# Patient Record
Sex: FEMALE | Race: WHITE | NOT HISPANIC OR LATINO | Employment: OTHER | URBAN - METROPOLITAN AREA
[De-identification: names, ages, dates, MRNs, and addresses within clinical notes are randomized per-mention and may not be internally consistent; named-entity substitution may affect disease eponyms.]

---

## 2017-01-10 ENCOUNTER — COMMUNICATION - HEALTHEAST (OUTPATIENT)
Dept: FAMILY MEDICINE | Facility: CLINIC | Age: 77
End: 2017-01-10

## 2017-01-10 DIAGNOSIS — F41.1 ANXIETY STATE: ICD-10-CM

## 2017-01-16 ENCOUNTER — COMMUNICATION - HEALTHEAST (OUTPATIENT)
Dept: FAMILY MEDICINE | Facility: CLINIC | Age: 77
End: 2017-01-16

## 2017-01-16 DIAGNOSIS — G62.9 NEUROPATHY: ICD-10-CM

## 2017-01-25 ENCOUNTER — COMMUNICATION - HEALTHEAST (OUTPATIENT)
Dept: FAMILY MEDICINE | Facility: CLINIC | Age: 77
End: 2017-01-25

## 2017-01-25 DIAGNOSIS — E11.40 DIABETIC NEUROPATHY (H): ICD-10-CM

## 2017-01-31 ENCOUNTER — COMMUNICATION - HEALTHEAST (OUTPATIENT)
Dept: SCHEDULING | Facility: CLINIC | Age: 77
End: 2017-01-31

## 2017-01-31 ENCOUNTER — OFFICE VISIT - HEALTHEAST (OUTPATIENT)
Dept: FAMILY MEDICINE | Facility: CLINIC | Age: 77
End: 2017-01-31

## 2017-01-31 DIAGNOSIS — J40 BRONCHITIS: ICD-10-CM

## 2017-01-31 ASSESSMENT — MIFFLIN-ST. JEOR: SCORE: 1276.38

## 2017-02-08 ENCOUNTER — COMMUNICATION - HEALTHEAST (OUTPATIENT)
Dept: FAMILY MEDICINE | Facility: CLINIC | Age: 77
End: 2017-02-08

## 2017-02-10 ENCOUNTER — OFFICE VISIT - HEALTHEAST (OUTPATIENT)
Dept: FAMILY MEDICINE | Facility: CLINIC | Age: 77
End: 2017-02-10

## 2017-02-10 DIAGNOSIS — J20.9 BRONCHITIS, ACUTE: ICD-10-CM

## 2017-02-10 DIAGNOSIS — J40 BRONCHITIS: ICD-10-CM

## 2017-02-10 ASSESSMENT — MIFFLIN-ST. JEOR: SCORE: 1278.28

## 2017-02-20 ENCOUNTER — COMMUNICATION - HEALTHEAST (OUTPATIENT)
Dept: FAMILY MEDICINE | Facility: CLINIC | Age: 77
End: 2017-02-20

## 2017-02-20 DIAGNOSIS — G62.9 NEUROPATHY: ICD-10-CM

## 2017-03-01 ENCOUNTER — OFFICE VISIT - HEALTHEAST (OUTPATIENT)
Dept: FAMILY MEDICINE | Facility: CLINIC | Age: 77
End: 2017-03-01

## 2017-03-01 DIAGNOSIS — M54.50 LUMBAGO: ICD-10-CM

## 2017-03-01 DIAGNOSIS — R06.09 OTHER FORMS OF DYSPNEA: ICD-10-CM

## 2017-03-01 DIAGNOSIS — R06.09 DYSPNEA ON EXERTION: ICD-10-CM

## 2017-03-01 ASSESSMENT — MIFFLIN-ST. JEOR: SCORE: 1280.55

## 2017-03-07 ENCOUNTER — COMMUNICATION - HEALTHEAST (OUTPATIENT)
Dept: CARDIOLOGY | Facility: HOSPITAL | Age: 77
End: 2017-03-07

## 2017-03-08 ENCOUNTER — HOSPITAL ENCOUNTER (OUTPATIENT)
Dept: NUCLEAR MEDICINE | Facility: HOSPITAL | Age: 77
Discharge: HOME | End: 2017-03-08
Attending: FAMILY MEDICINE

## 2017-03-08 ENCOUNTER — HOSPITAL ENCOUNTER (OUTPATIENT)
Dept: CARDIOLOGY | Facility: HOSPITAL | Age: 77
Discharge: HOME | End: 2017-03-08
Attending: FAMILY MEDICINE

## 2017-03-08 DIAGNOSIS — R06.09 DYSPNEA ON EXERTION: ICD-10-CM

## 2017-03-08 DIAGNOSIS — R06.09 OTHER FORMS OF DYSPNEA: ICD-10-CM

## 2017-03-08 LAB
CV STRESS CURRENT BP HE: NORMAL
CV STRESS CURRENT HR HE: 70
CV STRESS CURRENT HR HE: 70
CV STRESS CURRENT HR HE: 74
CV STRESS CURRENT HR HE: 78
CV STRESS CURRENT HR HE: 79
CV STRESS CURRENT HR HE: 80
CV STRESS CURRENT HR HE: 80
CV STRESS CURRENT HR HE: 81
CV STRESS CURRENT HR HE: 82
CV STRESS CURRENT HR HE: 82
CV STRESS CURRENT HR HE: 84
CV STRESS CURRENT HR HE: 84
CV STRESS CURRENT HR HE: 87
CV STRESS CURRENT HR HE: 88
CV STRESS CURRENT HR HE: 90
CV STRESS CURRENT HR HE: 92
CV STRESS DEVIATION TIME HE: NORMAL
CV STRESS ECHO PERCENT HR HE: NORMAL
CV STRESS EXERCISE STAGE HE: NORMAL
CV STRESS FINAL RESTING BP HE: NORMAL
CV STRESS FINAL RESTING HR HE: 79
CV STRESS MAX HR HE: 92
CV STRESS MAX TREADMILL GRADE HE: 0
CV STRESS MAX TREADMILL SPEED HE: 0
CV STRESS PEAK DIA BP HE: NORMAL
CV STRESS PEAK SYS BP HE: NORMAL
CV STRESS PHASE HE: NORMAL
CV STRESS PROTOCOL HE: NORMAL
CV STRESS RESTING PT POSITION HE: NORMAL
CV STRESS ST DEVIATION AMOUNT HE: NORMAL
CV STRESS ST DEVIATION ELEVATION HE: NORMAL
CV STRESS ST EVELATION AMOUNT HE: NORMAL
CV STRESS TEST TYPE HE: NORMAL
CV STRESS TOTAL STAGE TIME MIN 1 HE: NORMAL
NUC STRESS EJECTION FRACTION: 75 %
STRESS ECHO BASELINE BP: NORMAL
STRESS ECHO BASELINE HR: 72
STRESS ECHO CALCULATED PERCENT HR: 63 %
STRESS ECHO LAST STRESS BP: NORMAL
STRESS ECHO LAST STRESS HR: 84

## 2017-03-15 ENCOUNTER — COMMUNICATION - HEALTHEAST (OUTPATIENT)
Dept: FAMILY MEDICINE | Facility: CLINIC | Age: 77
End: 2017-03-15

## 2017-03-15 DIAGNOSIS — R06.00 DYSPNEA: ICD-10-CM

## 2017-03-16 ENCOUNTER — COMMUNICATION - HEALTHEAST (OUTPATIENT)
Dept: PULMONOLOGY | Facility: OTHER | Age: 77
End: 2017-03-16

## 2017-03-16 ENCOUNTER — COMMUNICATION - HEALTHEAST (OUTPATIENT)
Dept: FAMILY MEDICINE | Facility: CLINIC | Age: 77
End: 2017-03-16

## 2017-03-16 DIAGNOSIS — G62.9 NEUROPATHY: ICD-10-CM

## 2017-03-17 ENCOUNTER — AMBULATORY - HEALTHEAST (OUTPATIENT)
Dept: PULMONOLOGY | Facility: OTHER | Age: 77
End: 2017-03-17

## 2017-03-17 DIAGNOSIS — R06.00 DYSPNEA: ICD-10-CM

## 2017-03-24 ENCOUNTER — COMMUNICATION - HEALTHEAST (OUTPATIENT)
Dept: FAMILY MEDICINE | Facility: CLINIC | Age: 77
End: 2017-03-24

## 2017-03-24 DIAGNOSIS — E11.40 DIABETIC NEUROPATHY (H): ICD-10-CM

## 2017-04-17 ENCOUNTER — RECORDS - HEALTHEAST (OUTPATIENT)
Dept: ADMINISTRATIVE | Facility: OTHER | Age: 77
End: 2017-04-17

## 2017-04-17 ENCOUNTER — RECORDS - HEALTHEAST (OUTPATIENT)
Dept: PULMONOLOGY | Facility: OTHER | Age: 77
End: 2017-04-17

## 2017-04-17 ENCOUNTER — OFFICE VISIT - HEALTHEAST (OUTPATIENT)
Dept: PULMONOLOGY | Facility: OTHER | Age: 77
End: 2017-04-17

## 2017-04-17 DIAGNOSIS — J30.1 SEASONAL ALLERGIC RHINITIS DUE TO POLLEN: ICD-10-CM

## 2017-04-17 DIAGNOSIS — R94.2 DIFFUSION CAPACITY OF LUNG (DL), DECREASED: ICD-10-CM

## 2017-04-17 DIAGNOSIS — R91.8 PULMONARY INFILTRATES ON CXR: ICD-10-CM

## 2017-04-17 DIAGNOSIS — R06.09 DYSPNEA ON EXERTION: ICD-10-CM

## 2017-04-17 DIAGNOSIS — R06.00 DYSPNEA, UNSPECIFIED: ICD-10-CM

## 2017-04-17 ASSESSMENT — MIFFLIN-ST. JEOR: SCORE: 1289.62

## 2017-04-18 ENCOUNTER — HOSPITAL ENCOUNTER (OUTPATIENT)
Dept: CT IMAGING | Facility: HOSPITAL | Age: 77
Discharge: HOME | End: 2017-04-18
Attending: INTERNAL MEDICINE

## 2017-04-18 DIAGNOSIS — R06.09 OTHER FORMS OF DYSPNEA: ICD-10-CM

## 2017-04-18 DIAGNOSIS — R91.8 PULMONARY INFILTRATES ON CXR: ICD-10-CM

## 2017-04-18 DIAGNOSIS — R06.09 DYSPNEA ON EXERTION: ICD-10-CM

## 2017-04-18 DIAGNOSIS — R94.2 DIFFUSION CAPACITY OF LUNG (DL), DECREASED: ICD-10-CM

## 2017-04-19 ENCOUNTER — COMMUNICATION - HEALTHEAST (OUTPATIENT)
Dept: FAMILY MEDICINE | Facility: CLINIC | Age: 77
End: 2017-04-19

## 2017-04-19 DIAGNOSIS — M54.50 LUMBAGO: ICD-10-CM

## 2017-04-19 DIAGNOSIS — G62.9 NEUROPATHY: ICD-10-CM

## 2017-04-20 ENCOUNTER — COMMUNICATION - HEALTHEAST (OUTPATIENT)
Dept: INTENSIVE CARE | Facility: CLINIC | Age: 77
End: 2017-04-20

## 2017-05-01 ENCOUNTER — COMMUNICATION - HEALTHEAST (OUTPATIENT)
Dept: FAMILY MEDICINE | Facility: CLINIC | Age: 77
End: 2017-05-01

## 2017-05-02 ENCOUNTER — COMMUNICATION - HEALTHEAST (OUTPATIENT)
Dept: FAMILY MEDICINE | Facility: CLINIC | Age: 77
End: 2017-05-02

## 2017-05-09 ENCOUNTER — OFFICE VISIT - HEALTHEAST (OUTPATIENT)
Dept: FAMILY MEDICINE | Facility: CLINIC | Age: 77
End: 2017-05-09

## 2017-05-09 DIAGNOSIS — Z01.818 PREOP EXAMINATION: ICD-10-CM

## 2017-05-09 DIAGNOSIS — H26.9 CATARACT: ICD-10-CM

## 2017-05-09 DIAGNOSIS — J30.1 SEASONAL ALLERGIC RHINITIS DUE TO POLLEN: ICD-10-CM

## 2017-05-09 DIAGNOSIS — R06.09 DYSPNEA ON EXERTION: ICD-10-CM

## 2017-05-09 ASSESSMENT — MIFFLIN-ST. JEOR: SCORE: 1307.76

## 2017-05-16 ENCOUNTER — RECORDS - HEALTHEAST (OUTPATIENT)
Dept: ADMINISTRATIVE | Facility: OTHER | Age: 77
End: 2017-05-16

## 2017-05-23 ENCOUNTER — COMMUNICATION - HEALTHEAST (OUTPATIENT)
Dept: FAMILY MEDICINE | Facility: CLINIC | Age: 77
End: 2017-05-23

## 2017-05-23 DIAGNOSIS — G62.9 NEUROPATHY: ICD-10-CM

## 2017-05-25 ENCOUNTER — COMMUNICATION - HEALTHEAST (OUTPATIENT)
Dept: FAMILY MEDICINE | Facility: CLINIC | Age: 77
End: 2017-05-25

## 2017-05-25 DIAGNOSIS — E11.40 DIABETIC NEUROPATHY (H): ICD-10-CM

## 2017-05-30 ENCOUNTER — RECORDS - HEALTHEAST (OUTPATIENT)
Dept: ADMINISTRATIVE | Facility: OTHER | Age: 77
End: 2017-05-30

## 2017-06-14 ENCOUNTER — COMMUNICATION - HEALTHEAST (OUTPATIENT)
Dept: FAMILY MEDICINE | Facility: CLINIC | Age: 77
End: 2017-06-14

## 2017-06-14 DIAGNOSIS — I10 HTN (HYPERTENSION): ICD-10-CM

## 2017-06-21 ENCOUNTER — COMMUNICATION - HEALTHEAST (OUTPATIENT)
Dept: FAMILY MEDICINE | Facility: CLINIC | Age: 77
End: 2017-06-21

## 2017-06-21 DIAGNOSIS — G62.9 NEUROPATHY: ICD-10-CM

## 2017-06-21 DIAGNOSIS — R52 PAIN: ICD-10-CM

## 2017-07-09 ENCOUNTER — COMMUNICATION - HEALTHEAST (OUTPATIENT)
Dept: FAMILY MEDICINE | Facility: CLINIC | Age: 77
End: 2017-07-09

## 2017-07-09 DIAGNOSIS — R52 PAIN: ICD-10-CM

## 2017-07-10 ENCOUNTER — COMMUNICATION - HEALTHEAST (OUTPATIENT)
Dept: FAMILY MEDICINE | Facility: CLINIC | Age: 77
End: 2017-07-10

## 2017-07-10 DIAGNOSIS — E11.40 DIABETIC NEUROPATHY (H): ICD-10-CM

## 2017-07-11 ENCOUNTER — COMMUNICATION - HEALTHEAST (OUTPATIENT)
Dept: FAMILY MEDICINE | Facility: CLINIC | Age: 77
End: 2017-07-11

## 2017-07-11 DIAGNOSIS — M54.50 LUMBAGO: ICD-10-CM

## 2017-07-20 ENCOUNTER — COMMUNICATION - HEALTHEAST (OUTPATIENT)
Dept: FAMILY MEDICINE | Facility: CLINIC | Age: 77
End: 2017-07-20

## 2017-07-20 DIAGNOSIS — G62.9 NEUROPATHY: ICD-10-CM

## 2017-07-21 ENCOUNTER — COMMUNICATION - HEALTHEAST (OUTPATIENT)
Dept: FAMILY MEDICINE | Facility: CLINIC | Age: 77
End: 2017-07-21

## 2017-07-21 DIAGNOSIS — J30.9 ALLERGIC RHINITIS, CAUSE UNSPECIFIED: ICD-10-CM

## 2017-08-02 ENCOUNTER — COMMUNICATION - HEALTHEAST (OUTPATIENT)
Dept: FAMILY MEDICINE | Facility: CLINIC | Age: 77
End: 2017-08-02

## 2017-08-02 DIAGNOSIS — F41.1 ANXIETY STATE: ICD-10-CM

## 2017-08-10 ENCOUNTER — COMMUNICATION - HEALTHEAST (OUTPATIENT)
Dept: FAMILY MEDICINE | Facility: CLINIC | Age: 77
End: 2017-08-10

## 2017-08-10 DIAGNOSIS — M54.50 LUMBAGO: ICD-10-CM

## 2017-09-13 ENCOUNTER — COMMUNICATION - HEALTHEAST (OUTPATIENT)
Dept: FAMILY MEDICINE | Facility: CLINIC | Age: 77
End: 2017-09-13

## 2017-09-13 DIAGNOSIS — G62.9 NEUROPATHY: ICD-10-CM

## 2017-09-13 DIAGNOSIS — K21.9 GERD (GASTROESOPHAGEAL REFLUX DISEASE): ICD-10-CM

## 2017-09-21 ENCOUNTER — COMMUNICATION - HEALTHEAST (OUTPATIENT)
Dept: FAMILY MEDICINE | Facility: CLINIC | Age: 77
End: 2017-09-21

## 2017-09-21 DIAGNOSIS — E11.40 DIABETIC NEUROPATHY (H): ICD-10-CM

## 2017-10-25 ENCOUNTER — COMMUNICATION - HEALTHEAST (OUTPATIENT)
Dept: FAMILY MEDICINE | Facility: CLINIC | Age: 77
End: 2017-10-25

## 2017-10-25 DIAGNOSIS — F41.1 ANXIETY STATE: ICD-10-CM

## 2017-11-01 ENCOUNTER — RECORDS - HEALTHEAST (OUTPATIENT)
Dept: ADMINISTRATIVE | Facility: OTHER | Age: 77
End: 2017-11-01

## 2017-11-01 ENCOUNTER — OFFICE VISIT - HEALTHEAST (OUTPATIENT)
Dept: FAMILY MEDICINE | Facility: CLINIC | Age: 77
End: 2017-11-01

## 2017-11-01 DIAGNOSIS — Z00.00 ROUTINE GENERAL MEDICAL EXAMINATION AT A HEALTH CARE FACILITY: ICD-10-CM

## 2017-11-01 DIAGNOSIS — M54.50 LUMBAGO: ICD-10-CM

## 2017-11-01 DIAGNOSIS — G62.9 NEUROPATHY: ICD-10-CM

## 2017-11-01 ASSESSMENT — MIFFLIN-ST. JEOR: SCORE: 1294.72

## 2017-11-13 ENCOUNTER — COMMUNICATION - HEALTHEAST (OUTPATIENT)
Dept: FAMILY MEDICINE | Facility: CLINIC | Age: 77
End: 2017-11-13

## 2017-11-13 DIAGNOSIS — E11.40 DIABETIC NEUROPATHY (H): ICD-10-CM

## 2017-11-30 ENCOUNTER — COMMUNICATION - HEALTHEAST (OUTPATIENT)
Dept: FAMILY MEDICINE | Facility: CLINIC | Age: 77
End: 2017-11-30

## 2017-11-30 DIAGNOSIS — M54.50 LUMBAGO: ICD-10-CM

## 2017-11-30 DIAGNOSIS — J30.1 SEASONAL ALLERGIC RHINITIS DUE TO POLLEN: ICD-10-CM

## 2017-12-08 ENCOUNTER — COMMUNICATION - HEALTHEAST (OUTPATIENT)
Dept: FAMILY MEDICINE | Facility: CLINIC | Age: 77
End: 2017-12-08

## 2017-12-08 DIAGNOSIS — J40 BRONCHITIS: ICD-10-CM

## 2017-12-18 ENCOUNTER — OFFICE VISIT - HEALTHEAST (OUTPATIENT)
Dept: FAMILY MEDICINE | Facility: CLINIC | Age: 77
End: 2017-12-18

## 2017-12-18 DIAGNOSIS — J32.9 SINUSITIS: ICD-10-CM

## 2017-12-19 ENCOUNTER — COMMUNICATION - HEALTHEAST (OUTPATIENT)
Dept: FAMILY MEDICINE | Facility: CLINIC | Age: 77
End: 2017-12-19

## 2017-12-19 DIAGNOSIS — G62.9 NEUROPATHY: ICD-10-CM

## 2017-12-20 ENCOUNTER — COMMUNICATION - HEALTHEAST (OUTPATIENT)
Dept: FAMILY MEDICINE | Facility: CLINIC | Age: 77
End: 2017-12-20

## 2017-12-20 DIAGNOSIS — J30.9 ALLERGIC RHINITIS: ICD-10-CM

## 2017-12-27 ENCOUNTER — COMMUNICATION - HEALTHEAST (OUTPATIENT)
Dept: FAMILY MEDICINE | Facility: CLINIC | Age: 77
End: 2017-12-27

## 2017-12-27 DIAGNOSIS — M54.50 LUMBAGO: ICD-10-CM

## 2017-12-30 ENCOUNTER — COMMUNICATION - HEALTHEAST (OUTPATIENT)
Dept: FAMILY MEDICINE | Facility: CLINIC | Age: 77
End: 2017-12-30

## 2017-12-30 DIAGNOSIS — J30.1 SEASONAL ALLERGIC RHINITIS DUE TO POLLEN: ICD-10-CM

## 2018-01-15 ENCOUNTER — COMMUNICATION - HEALTHEAST (OUTPATIENT)
Dept: FAMILY MEDICINE | Facility: CLINIC | Age: 78
End: 2018-01-15

## 2018-01-15 DIAGNOSIS — E11.40 DIABETIC NEUROPATHY (H): ICD-10-CM

## 2018-01-17 ENCOUNTER — COMMUNICATION - HEALTHEAST (OUTPATIENT)
Dept: FAMILY MEDICINE | Facility: CLINIC | Age: 78
End: 2018-01-17

## 2018-01-17 DIAGNOSIS — F41.1 ANXIETY STATE: ICD-10-CM

## 2018-01-30 ENCOUNTER — COMMUNICATION - HEALTHEAST (OUTPATIENT)
Dept: FAMILY MEDICINE | Facility: CLINIC | Age: 78
End: 2018-01-30

## 2018-01-30 DIAGNOSIS — G62.9 NEUROPATHY: ICD-10-CM

## 2018-02-08 ENCOUNTER — COMMUNICATION - HEALTHEAST (OUTPATIENT)
Dept: FAMILY MEDICINE | Facility: CLINIC | Age: 78
End: 2018-02-08

## 2018-02-08 DIAGNOSIS — J30.1 SEASONAL ALLERGIC RHINITIS DUE TO POLLEN: ICD-10-CM

## 2018-02-22 ENCOUNTER — OFFICE VISIT - HEALTHEAST (OUTPATIENT)
Dept: FAMILY MEDICINE | Facility: CLINIC | Age: 78
End: 2018-02-22

## 2018-02-22 DIAGNOSIS — D17.22 LIPOMA OF LEFT UPPER EXTREMITY: ICD-10-CM

## 2018-02-22 ASSESSMENT — MIFFLIN-ST. JEOR: SCORE: 1308.33

## 2018-03-04 ENCOUNTER — COMMUNICATION - HEALTHEAST (OUTPATIENT)
Dept: FAMILY MEDICINE | Facility: CLINIC | Age: 78
End: 2018-03-04

## 2018-03-04 DIAGNOSIS — I10 HTN (HYPERTENSION): ICD-10-CM

## 2018-03-12 ENCOUNTER — AMBULATORY - HEALTHEAST (OUTPATIENT)
Dept: FAMILY MEDICINE | Facility: CLINIC | Age: 78
End: 2018-03-12

## 2018-03-12 ENCOUNTER — COMMUNICATION - HEALTHEAST (OUTPATIENT)
Dept: FAMILY MEDICINE | Facility: CLINIC | Age: 78
End: 2018-03-12

## 2018-03-12 ENCOUNTER — AMBULATORY - HEALTHEAST (OUTPATIENT)
Dept: LAB | Facility: CLINIC | Age: 78
End: 2018-03-12

## 2018-03-12 DIAGNOSIS — I10 HYPERTENSION: ICD-10-CM

## 2018-03-12 DIAGNOSIS — E11.40 DIABETIC NEUROPATHY (H): ICD-10-CM

## 2018-03-12 DIAGNOSIS — J30.1 SEASONAL ALLERGIC RHINITIS DUE TO POLLEN: ICD-10-CM

## 2018-03-12 LAB
ANION GAP SERPL CALCULATED.3IONS-SCNC: 13 MMOL/L (ref 5–18)
BUN SERPL-MCNC: 23 MG/DL (ref 8–28)
CALCIUM SERPL-MCNC: 9.4 MG/DL (ref 8.5–10.5)
CHLORIDE BLD-SCNC: 108 MMOL/L (ref 98–107)
CO2 SERPL-SCNC: 20 MMOL/L (ref 22–31)
CREAT SERPL-MCNC: 0.89 MG/DL (ref 0.6–1.1)
GFR SERPL CREATININE-BSD FRML MDRD: >60 ML/MIN/1.73M2
GLUCOSE BLD-MCNC: 93 MG/DL (ref 70–125)
POTASSIUM BLD-SCNC: 4.9 MMOL/L (ref 3.5–5)
SODIUM SERPL-SCNC: 141 MMOL/L (ref 136–145)

## 2018-03-13 ENCOUNTER — COMMUNICATION - HEALTHEAST (OUTPATIENT)
Dept: FAMILY MEDICINE | Facility: CLINIC | Age: 78
End: 2018-03-13

## 2018-03-13 DIAGNOSIS — G62.9 NEUROPATHY: ICD-10-CM

## 2018-04-05 ENCOUNTER — OFFICE VISIT - HEALTHEAST (OUTPATIENT)
Dept: FAMILY MEDICINE | Facility: CLINIC | Age: 78
End: 2018-04-05

## 2018-04-05 DIAGNOSIS — M54.50 LUMBAGO: ICD-10-CM

## 2018-04-05 DIAGNOSIS — R22.32 MASS OF LEFT FOREARM: ICD-10-CM

## 2018-04-05 ASSESSMENT — MIFFLIN-ST. JEOR: SCORE: 1284.52

## 2018-04-12 ENCOUNTER — HOSPITAL ENCOUNTER (OUTPATIENT)
Dept: MRI IMAGING | Facility: HOSPITAL | Age: 78
Discharge: HOME | End: 2018-04-12
Attending: FAMILY MEDICINE

## 2018-04-12 DIAGNOSIS — R22.32 MASS OF LEFT FOREARM: ICD-10-CM

## 2018-04-13 ENCOUNTER — AMBULATORY - HEALTHEAST (OUTPATIENT)
Dept: FAMILY MEDICINE | Facility: CLINIC | Age: 78
End: 2018-04-13

## 2018-04-13 DIAGNOSIS — R22.30: ICD-10-CM

## 2018-04-16 ENCOUNTER — COMMUNICATION - HEALTHEAST (OUTPATIENT)
Dept: FAMILY MEDICINE | Facility: CLINIC | Age: 78
End: 2018-04-16

## 2018-04-16 DIAGNOSIS — G62.9 NEUROPATHY: ICD-10-CM

## 2018-04-18 ENCOUNTER — RECORDS - HEALTHEAST (OUTPATIENT)
Dept: ADMINISTRATIVE | Facility: OTHER | Age: 78
End: 2018-04-18

## 2018-04-20 ENCOUNTER — COMMUNICATION - HEALTHEAST (OUTPATIENT)
Dept: FAMILY MEDICINE | Facility: CLINIC | Age: 78
End: 2018-04-20

## 2018-04-20 DIAGNOSIS — J30.1 SEASONAL ALLERGIC RHINITIS DUE TO POLLEN: ICD-10-CM

## 2018-04-20 DIAGNOSIS — J30.9 ALLERGIC RHINITIS: ICD-10-CM

## 2018-04-21 ENCOUNTER — COMMUNICATION - HEALTHEAST (OUTPATIENT)
Dept: FAMILY MEDICINE | Facility: CLINIC | Age: 78
End: 2018-04-21

## 2018-04-21 DIAGNOSIS — J30.9 ALLERGIC RHINITIS: ICD-10-CM

## 2018-04-24 ENCOUNTER — COMMUNICATION - HEALTHEAST (OUTPATIENT)
Dept: FAMILY MEDICINE | Facility: CLINIC | Age: 78
End: 2018-04-24

## 2018-04-25 ENCOUNTER — COMMUNICATION - HEALTHEAST (OUTPATIENT)
Dept: FAMILY MEDICINE | Facility: CLINIC | Age: 78
End: 2018-04-25

## 2018-04-25 DIAGNOSIS — R06.09 DYSPNEA ON EXERTION: ICD-10-CM

## 2018-04-30 ENCOUNTER — AMBULATORY - HEALTHEAST (OUTPATIENT)
Dept: FAMILY MEDICINE | Facility: CLINIC | Age: 78
End: 2018-04-30

## 2018-04-30 ENCOUNTER — COMMUNICATION - HEALTHEAST (OUTPATIENT)
Dept: FAMILY MEDICINE | Facility: CLINIC | Age: 78
End: 2018-04-30

## 2018-05-03 ENCOUNTER — COMMUNICATION - HEALTHEAST (OUTPATIENT)
Dept: FAMILY MEDICINE | Facility: CLINIC | Age: 78
End: 2018-05-03

## 2018-05-03 DIAGNOSIS — M54.50 LUMBAGO: ICD-10-CM

## 2018-05-21 ENCOUNTER — COMMUNICATION - HEALTHEAST (OUTPATIENT)
Dept: FAMILY MEDICINE | Facility: CLINIC | Age: 78
End: 2018-05-21

## 2018-05-21 DIAGNOSIS — J30.9 ALLERGIC RHINITIS: ICD-10-CM

## 2018-05-22 ENCOUNTER — COMMUNICATION - HEALTHEAST (OUTPATIENT)
Dept: FAMILY MEDICINE | Facility: CLINIC | Age: 78
End: 2018-05-22

## 2018-05-22 DIAGNOSIS — G62.9 NEUROPATHY: ICD-10-CM

## 2018-05-31 ENCOUNTER — COMMUNICATION - HEALTHEAST (OUTPATIENT)
Dept: FAMILY MEDICINE | Facility: CLINIC | Age: 78
End: 2018-05-31

## 2018-05-31 DIAGNOSIS — I10 HTN (HYPERTENSION): ICD-10-CM

## 2018-06-01 ENCOUNTER — COMMUNICATION - HEALTHEAST (OUTPATIENT)
Dept: FAMILY MEDICINE | Facility: CLINIC | Age: 78
End: 2018-06-01

## 2018-06-01 DIAGNOSIS — I10 HTN (HYPERTENSION): ICD-10-CM

## 2018-06-09 ENCOUNTER — COMMUNICATION - HEALTHEAST (OUTPATIENT)
Dept: FAMILY MEDICINE | Facility: CLINIC | Age: 78
End: 2018-06-09

## 2018-06-09 DIAGNOSIS — R52 PAIN: ICD-10-CM

## 2018-06-11 ENCOUNTER — AMBULATORY - HEALTHEAST (OUTPATIENT)
Dept: FAMILY MEDICINE | Facility: CLINIC | Age: 78
End: 2018-06-11

## 2018-06-11 ENCOUNTER — OFFICE VISIT - HEALTHEAST (OUTPATIENT)
Dept: FAMILY MEDICINE | Facility: CLINIC | Age: 78
End: 2018-06-11

## 2018-06-11 DIAGNOSIS — R07.1 CHEST PAIN ON BREATHING: ICD-10-CM

## 2018-06-11 DIAGNOSIS — R06.02 SHORTNESS OF BREATH: ICD-10-CM

## 2018-06-11 ASSESSMENT — MIFFLIN-ST. JEOR: SCORE: 1281.11

## 2018-06-12 ENCOUNTER — COMMUNICATION - HEALTHEAST (OUTPATIENT)
Dept: FAMILY MEDICINE | Facility: CLINIC | Age: 78
End: 2018-06-12

## 2018-06-20 ENCOUNTER — COMMUNICATION - HEALTHEAST (OUTPATIENT)
Dept: FAMILY MEDICINE | Facility: CLINIC | Age: 78
End: 2018-06-20

## 2018-06-20 DIAGNOSIS — E11.40 DIABETIC NEUROPATHY (H): ICD-10-CM

## 2018-06-25 ENCOUNTER — COMMUNICATION - HEALTHEAST (OUTPATIENT)
Dept: SCHEDULING | Facility: CLINIC | Age: 78
End: 2018-06-25

## 2018-06-25 ENCOUNTER — COMMUNICATION - HEALTHEAST (OUTPATIENT)
Dept: FAMILY MEDICINE | Facility: CLINIC | Age: 78
End: 2018-06-25

## 2018-06-25 DIAGNOSIS — M54.50 LUMBAGO: ICD-10-CM

## 2018-06-25 DIAGNOSIS — K21.9 GERD (GASTROESOPHAGEAL REFLUX DISEASE): ICD-10-CM

## 2018-06-25 DIAGNOSIS — G62.9 NEUROPATHY: ICD-10-CM

## 2018-07-05 ENCOUNTER — OFFICE VISIT - HEALTHEAST (OUTPATIENT)
Dept: FAMILY MEDICINE | Facility: CLINIC | Age: 78
End: 2018-07-05

## 2018-07-05 DIAGNOSIS — L03.115 CELLULITIS OF RIGHT LOWER EXTREMITY: ICD-10-CM

## 2018-07-05 DIAGNOSIS — L98.9 SKIN SORE: ICD-10-CM

## 2018-07-07 ENCOUNTER — COMMUNICATION - HEALTHEAST (OUTPATIENT)
Dept: SCHEDULING | Facility: CLINIC | Age: 78
End: 2018-07-07

## 2018-07-08 LAB
BACTERIA SPEC CULT: ABNORMAL
BACTERIA SPEC CULT: ABNORMAL

## 2018-07-10 ENCOUNTER — COMMUNICATION - HEALTHEAST (OUTPATIENT)
Dept: PODIATRY | Facility: CLINIC | Age: 78
End: 2018-07-10

## 2018-07-11 ENCOUNTER — COMMUNICATION - HEALTHEAST (OUTPATIENT)
Dept: PHARMACY | Facility: CLINIC | Age: 78
End: 2018-07-11

## 2018-07-16 ENCOUNTER — COMMUNICATION - HEALTHEAST (OUTPATIENT)
Dept: FAMILY MEDICINE | Facility: CLINIC | Age: 78
End: 2018-07-16

## 2018-07-24 ENCOUNTER — COMMUNICATION - HEALTHEAST (OUTPATIENT)
Dept: SCHEDULING | Facility: CLINIC | Age: 78
End: 2018-07-24

## 2018-07-24 DIAGNOSIS — M54.50 LUMBAGO: ICD-10-CM

## 2018-07-25 ENCOUNTER — COMMUNICATION - HEALTHEAST (OUTPATIENT)
Dept: FAMILY MEDICINE | Facility: CLINIC | Age: 78
End: 2018-07-25

## 2018-07-25 DIAGNOSIS — G62.9 NEUROPATHY: ICD-10-CM

## 2018-08-10 ENCOUNTER — COMMUNICATION - HEALTHEAST (OUTPATIENT)
Dept: SCHEDULING | Facility: CLINIC | Age: 78
End: 2018-08-10

## 2018-08-10 DIAGNOSIS — G62.9 NEUROPATHY: ICD-10-CM

## 2018-08-23 ENCOUNTER — COMMUNICATION - HEALTHEAST (OUTPATIENT)
Dept: FAMILY MEDICINE | Facility: CLINIC | Age: 78
End: 2018-08-23

## 2018-08-23 DIAGNOSIS — R06.09 DYSPNEA ON EXERTION: ICD-10-CM

## 2018-08-27 ENCOUNTER — COMMUNICATION - HEALTHEAST (OUTPATIENT)
Dept: FAMILY MEDICINE | Facility: CLINIC | Age: 78
End: 2018-08-27

## 2018-08-27 ENCOUNTER — OFFICE VISIT - HEALTHEAST (OUTPATIENT)
Dept: FAMILY MEDICINE | Facility: CLINIC | Age: 78
End: 2018-08-27

## 2018-08-27 DIAGNOSIS — M54.50 LUMBAGO: ICD-10-CM

## 2018-08-27 DIAGNOSIS — M25.511 SHOULDER PAIN, RIGHT: ICD-10-CM

## 2018-08-27 ASSESSMENT — MIFFLIN-ST. JEOR: SCORE: 1286.22

## 2018-09-11 ENCOUNTER — COMMUNICATION - HEALTHEAST (OUTPATIENT)
Dept: FAMILY MEDICINE | Facility: CLINIC | Age: 78
End: 2018-09-11

## 2018-09-11 DIAGNOSIS — G62.9 NEUROPATHY: ICD-10-CM

## 2018-09-13 ENCOUNTER — COMMUNICATION - HEALTHEAST (OUTPATIENT)
Dept: FAMILY MEDICINE | Facility: CLINIC | Age: 78
End: 2018-09-13

## 2018-09-16 ENCOUNTER — COMMUNICATION - HEALTHEAST (OUTPATIENT)
Dept: FAMILY MEDICINE | Facility: CLINIC | Age: 78
End: 2018-09-16

## 2018-09-16 DIAGNOSIS — E11.40 DIABETIC NEUROPATHY (H): ICD-10-CM

## 2018-09-17 ENCOUNTER — OFFICE VISIT - HEALTHEAST (OUTPATIENT)
Dept: FAMILY MEDICINE | Facility: CLINIC | Age: 78
End: 2018-09-17

## 2018-09-17 DIAGNOSIS — N30.00 ACUTE CYSTITIS WITHOUT HEMATURIA: ICD-10-CM

## 2018-09-17 LAB
ALBUMIN UR-MCNC: NEGATIVE MG/DL
APPEARANCE UR: CLEAR
BACTERIA #/AREA URNS HPF: ABNORMAL HPF
BILIRUB UR QL STRIP: NEGATIVE
COLOR UR AUTO: YELLOW
GLUCOSE UR STRIP-MCNC: NEGATIVE MG/DL
HGB UR QL STRIP: NEGATIVE
KETONES UR STRIP-MCNC: NEGATIVE MG/DL
LEUKOCYTE ESTERASE UR QL STRIP: ABNORMAL
NITRATE UR QL: NEGATIVE
PH UR STRIP: 5 [PH] (ref 5–8)
RBC #/AREA URNS AUTO: ABNORMAL HPF
SP GR UR STRIP: 1.02 (ref 1–1.03)
SQUAMOUS #/AREA URNS AUTO: ABNORMAL LPF
UROBILINOGEN UR STRIP-ACNC: ABNORMAL
WBC #/AREA URNS AUTO: ABNORMAL HPF

## 2018-09-18 LAB — BACTERIA SPEC CULT: NORMAL

## 2018-09-24 ENCOUNTER — OFFICE VISIT - HEALTHEAST (OUTPATIENT)
Dept: FAMILY MEDICINE | Facility: CLINIC | Age: 78
End: 2018-09-24

## 2018-09-24 DIAGNOSIS — R10.30 LOWER ABDOMINAL PAIN: ICD-10-CM

## 2018-09-24 DIAGNOSIS — F41.1 ANXIETY STATE: ICD-10-CM

## 2018-09-24 DIAGNOSIS — F32.9 MAJOR DEPRESSION: ICD-10-CM

## 2018-09-24 ASSESSMENT — MIFFLIN-ST. JEOR: SCORE: 1273.75

## 2018-09-25 ENCOUNTER — AMBULATORY - HEALTHEAST (OUTPATIENT)
Dept: FAMILY MEDICINE | Facility: CLINIC | Age: 78
End: 2018-09-25

## 2018-09-25 ENCOUNTER — HOSPITAL ENCOUNTER (OUTPATIENT)
Dept: CT IMAGING | Facility: HOSPITAL | Age: 78
Discharge: HOME | End: 2018-09-25
Attending: FAMILY MEDICINE

## 2018-09-25 DIAGNOSIS — R10.30 LOWER ABDOMINAL PAIN: ICD-10-CM

## 2018-09-25 LAB
CREAT BLD-MCNC: 1.3 MG/DL
POC GFR AMER AF HE - HISTORICAL: 48 ML/MIN/1.73M2
POC GFR NON AMER AF HE - HISTORICAL: 40 ML/MIN/1.73M2

## 2018-09-27 ENCOUNTER — COMMUNICATION - HEALTHEAST (OUTPATIENT)
Dept: FAMILY MEDICINE | Facility: CLINIC | Age: 78
End: 2018-09-27

## 2018-09-27 DIAGNOSIS — M54.50 LUMBAGO: ICD-10-CM

## 2018-10-08 ENCOUNTER — COMMUNICATION - HEALTHEAST (OUTPATIENT)
Dept: FAMILY MEDICINE | Facility: CLINIC | Age: 78
End: 2018-10-08

## 2018-10-08 DIAGNOSIS — G62.9 NEUROPATHY: ICD-10-CM

## 2018-10-11 ENCOUNTER — OFFICE VISIT - HEALTHEAST (OUTPATIENT)
Dept: FAMILY MEDICINE | Facility: CLINIC | Age: 78
End: 2018-10-11

## 2018-10-11 DIAGNOSIS — J30.1 SEASONAL ALLERGIC RHINITIS DUE TO POLLEN: ICD-10-CM

## 2018-10-11 DIAGNOSIS — K57.32 DIVERTICULITIS OF COLON: ICD-10-CM

## 2018-10-11 DIAGNOSIS — F32.9 MAJOR DEPRESSION: ICD-10-CM

## 2018-10-11 ASSESSMENT — MIFFLIN-ST. JEOR: SCORE: 1278.29

## 2018-11-09 ENCOUNTER — COMMUNICATION - HEALTHEAST (OUTPATIENT)
Dept: FAMILY MEDICINE | Facility: CLINIC | Age: 78
End: 2018-11-09

## 2018-11-09 DIAGNOSIS — G62.9 NEUROPATHY: ICD-10-CM

## 2018-11-14 ENCOUNTER — COMMUNICATION - HEALTHEAST (OUTPATIENT)
Dept: FAMILY MEDICINE | Facility: CLINIC | Age: 78
End: 2018-11-14

## 2018-11-14 DIAGNOSIS — E11.40 DIABETIC NEUROPATHY (H): ICD-10-CM

## 2018-11-14 DIAGNOSIS — M54.50 LUMBAGO: ICD-10-CM

## 2018-12-05 ENCOUNTER — COMMUNICATION - HEALTHEAST (OUTPATIENT)
Dept: FAMILY MEDICINE | Facility: CLINIC | Age: 78
End: 2018-12-05

## 2018-12-05 DIAGNOSIS — R52 PAIN: ICD-10-CM

## 2018-12-10 ENCOUNTER — COMMUNICATION - HEALTHEAST (OUTPATIENT)
Dept: FAMILY MEDICINE | Facility: CLINIC | Age: 78
End: 2018-12-10

## 2018-12-10 DIAGNOSIS — G62.9 NEUROPATHY: ICD-10-CM

## 2018-12-11 ENCOUNTER — COMMUNICATION - HEALTHEAST (OUTPATIENT)
Dept: FAMILY MEDICINE | Facility: CLINIC | Age: 78
End: 2018-12-11

## 2018-12-11 DIAGNOSIS — J30.9 ALLERGIC RHINITIS: ICD-10-CM

## 2018-12-12 ENCOUNTER — COMMUNICATION - HEALTHEAST (OUTPATIENT)
Dept: FAMILY MEDICINE | Facility: CLINIC | Age: 78
End: 2018-12-12

## 2018-12-12 DIAGNOSIS — M54.50 LUMBAGO: ICD-10-CM

## 2019-01-10 ENCOUNTER — COMMUNICATION - HEALTHEAST (OUTPATIENT)
Dept: FAMILY MEDICINE | Facility: CLINIC | Age: 79
End: 2019-01-10

## 2019-01-10 DIAGNOSIS — M54.50 LUMBAGO: ICD-10-CM

## 2019-01-15 ENCOUNTER — COMMUNICATION - HEALTHEAST (OUTPATIENT)
Dept: FAMILY MEDICINE | Facility: CLINIC | Age: 79
End: 2019-01-15

## 2019-01-15 DIAGNOSIS — E11.40 DIABETIC NEUROPATHY (H): ICD-10-CM

## 2019-01-16 ENCOUNTER — COMMUNICATION - HEALTHEAST (OUTPATIENT)
Dept: FAMILY MEDICINE | Facility: CLINIC | Age: 79
End: 2019-01-16

## 2019-01-16 DIAGNOSIS — G62.9 NEUROPATHY: ICD-10-CM

## 2019-01-17 ENCOUNTER — OFFICE VISIT - HEALTHEAST (OUTPATIENT)
Dept: FAMILY MEDICINE | Facility: CLINIC | Age: 79
End: 2019-01-17

## 2019-01-17 ENCOUNTER — HOSPITAL ENCOUNTER (OUTPATIENT)
Dept: LAB | Age: 79
Setting detail: SPECIMEN
Discharge: HOME | End: 2019-01-17

## 2019-01-17 DIAGNOSIS — J20.9 BRONCHITIS WITH BRONCHOSPASM: ICD-10-CM

## 2019-01-17 DIAGNOSIS — N32.81 OVERACTIVE BLADDER: ICD-10-CM

## 2019-01-17 DIAGNOSIS — R35.0 URINE FREQUENCY: ICD-10-CM

## 2019-01-17 DIAGNOSIS — N39.3 FEMALE STRESS INCONTINENCE: ICD-10-CM

## 2019-01-17 LAB
ALBUMIN UR-MCNC: ABNORMAL MG/DL
APPEARANCE UR: CLEAR
BILIRUB UR QL STRIP: NEGATIVE
COLOR UR AUTO: YELLOW
GLUCOSE UR STRIP-MCNC: NEGATIVE MG/DL
HGB UR QL STRIP: NEGATIVE
KETONES UR STRIP-MCNC: NEGATIVE MG/DL
LEUKOCYTE ESTERASE UR QL STRIP: ABNORMAL
NITRATE UR QL: NEGATIVE
PH UR STRIP: 5 [PH] (ref 5–8)
SP GR UR STRIP: 1.01 (ref 1–1.03)
UROBILINOGEN UR STRIP-ACNC: ABNORMAL

## 2019-01-18 LAB — BACTERIA SPEC CULT: NORMAL

## 2019-01-23 ENCOUNTER — COMMUNICATION - HEALTHEAST (OUTPATIENT)
Dept: FAMILY MEDICINE | Facility: CLINIC | Age: 79
End: 2019-01-23

## 2019-01-23 ENCOUNTER — AMBULATORY - HEALTHEAST (OUTPATIENT)
Dept: FAMILY MEDICINE | Facility: CLINIC | Age: 79
End: 2019-01-23

## 2019-02-01 ENCOUNTER — COMMUNICATION - HEALTHEAST (OUTPATIENT)
Dept: SCHEDULING | Facility: CLINIC | Age: 79
End: 2019-02-01

## 2019-02-01 ENCOUNTER — OFFICE VISIT - HEALTHEAST (OUTPATIENT)
Dept: FAMILY MEDICINE | Facility: CLINIC | Age: 79
End: 2019-02-01

## 2019-02-01 DIAGNOSIS — J45.909 REACTIVE AIRWAY DISEASE WITHOUT COMPLICATION, UNSPECIFIED ASTHMA SEVERITY, UNSPECIFIED WHETHER PERSISTENT: ICD-10-CM

## 2019-02-05 ENCOUNTER — COMMUNICATION - HEALTHEAST (OUTPATIENT)
Dept: FAMILY MEDICINE | Facility: CLINIC | Age: 79
End: 2019-02-05

## 2019-02-05 DIAGNOSIS — J30.1 SEASONAL ALLERGIC RHINITIS DUE TO POLLEN: ICD-10-CM

## 2019-02-13 ENCOUNTER — OFFICE VISIT - HEALTHEAST (OUTPATIENT)
Dept: FAMILY MEDICINE | Facility: CLINIC | Age: 79
End: 2019-02-13

## 2019-02-13 ENCOUNTER — RECORDS - HEALTHEAST (OUTPATIENT)
Dept: GENERAL RADIOLOGY | Facility: CLINIC | Age: 79
End: 2019-02-13

## 2019-02-13 DIAGNOSIS — R06.02 SHORTNESS OF BREATH: ICD-10-CM

## 2019-02-13 DIAGNOSIS — J98.01 BRONCHOSPASM: ICD-10-CM

## 2019-02-13 DIAGNOSIS — N32.81 OVERACTIVE BLADDER: ICD-10-CM

## 2019-02-13 DIAGNOSIS — G62.9 NEUROPATHY: ICD-10-CM

## 2019-02-13 DIAGNOSIS — N39.3 FEMALE STRESS INCONTINENCE: ICD-10-CM

## 2019-02-13 DIAGNOSIS — I50.9 ACUTE CONGESTIVE HEART FAILURE, UNSPECIFIED HEART FAILURE TYPE (H): ICD-10-CM

## 2019-02-15 ENCOUNTER — COMMUNICATION - HEALTHEAST (OUTPATIENT)
Dept: FAMILY MEDICINE | Facility: CLINIC | Age: 79
End: 2019-02-15

## 2019-02-15 DIAGNOSIS — M54.50 LUMBAGO: ICD-10-CM

## 2019-02-21 ENCOUNTER — OFFICE VISIT - HEALTHEAST (OUTPATIENT)
Dept: FAMILY MEDICINE | Facility: CLINIC | Age: 79
End: 2019-02-21

## 2019-02-21 DIAGNOSIS — I50.9 ACUTE CONGESTIVE HEART FAILURE, UNSPECIFIED HEART FAILURE TYPE (H): ICD-10-CM

## 2019-02-21 DIAGNOSIS — R06.09 DYSPNEA ON EXERTION: ICD-10-CM

## 2019-02-27 ENCOUNTER — HOSPITAL ENCOUNTER (OUTPATIENT)
Dept: CARDIOLOGY | Facility: HOSPITAL | Age: 79
Discharge: HOME | End: 2019-02-27
Attending: FAMILY MEDICINE

## 2019-02-27 DIAGNOSIS — R06.09 OTHER FORMS OF DYSPNEA: ICD-10-CM

## 2019-02-27 DIAGNOSIS — R06.09 DYSPNEA ON EXERTION: ICD-10-CM

## 2019-02-27 LAB
AORTIC ROOT: 3.1 CM
AR DECEL SLOPE: 2580 MM/S2
AR PEAK VELOCITY: 343 CM/S
AV REGURGITANT PEAK GRADIENT: 47.1 MMHG
AV REGURGITATION PRESSURE HALF TIME: 390 MS
BSA FOR ECHO PROCEDURE: 1.93 M2
CV BLOOD PRESSURE: ABNORMAL MMHG
CV ECHO HEIGHT: 61 IN
CV ECHO WEIGHT: 192 LBS
DOP CALC LVOT AREA: 2.83 CM2
DOP CALC LVOT DIAMETER: 1.9 CM
DOP CALC LVOT STROKE VOLUME: 51.3 CM3
DOP CALC MV VTI: 38.7 CM
DOP CALCLVOT PEAK VEL VTI: 18.1 CM
EJECTION FRACTION: 56 % (ref 55–75)
FRACTIONAL SHORTENING: 31.1 % (ref 28–44)
INTERVENTRICULAR SEPTUM IN END DIASTOLE: 1.1 CM (ref 0.6–0.9)
IVS/PW RATIO: 1
LA AREA 1: 23.5 CM2
LA AREA 2: 18.1 CM2
LEFT ATRIUM LENGTH: 5.06 CM
LEFT ATRIUM SIZE: 4.3 CM
LEFT ATRIUM VOLUME INDEX: 37 ML/M2
LEFT ATRIUM VOLUME: 71.5 ML
LEFT VENTRICLE CARDIAC INDEX: 1.8 L/MIN/M2
LEFT VENTRICLE CARDIAC OUTPUT: 3.5 L/MIN
LEFT VENTRICLE DIASTOLIC VOLUME INDEX: 60.1 CM3/M2 (ref 29–61)
LEFT VENTRICLE DIASTOLIC VOLUME: 116 CM3 (ref 46–106)
LEFT VENTRICLE HEART RATE: 69 BPM
LEFT VENTRICLE MASS INDEX: 90.7 G/M2
LEFT VENTRICLE SYSTOLIC VOLUME INDEX: 26.4 CM3/M2 (ref 8–24)
LEFT VENTRICLE SYSTOLIC VOLUME: 51 CM3 (ref 14–42)
LEFT VENTRICULAR INTERNAL DIMENSION IN DIASTOLE: 4.5 CM (ref 3.8–5.2)
LEFT VENTRICULAR INTERNAL DIMENSION IN SYSTOLE: 3.1 CM (ref 2.2–3.5)
LEFT VENTRICULAR MASS: 175 G
LEFT VENTRICULAR OUTFLOW TRACT MEAN GRADIENT: 1 MMHG
LEFT VENTRICULAR OUTFLOW TRACT MEAN VELOCITY: 55.5 CM/S
LEFT VENTRICULAR POSTERIOR WALL IN END DIASTOLE: 1.1 CM (ref 0.6–0.9)
LV STROKE VOLUME INDEX: 26.6 ML/M2
MITRAL REGURGITANT VELOCITY TIME INTEGRAL: 167 CM
MITRAL VALVE DECELERATION SLOPE: 4580 MM/S2
MITRAL VALVE E/A RATIO: 1.1
MITRAL VALVE MEAN INFLOW VELOCITY: 69.7 CM/S
MITRAL VALVE PEAK VELOCITY: 130 CM/S
MITRAL VALVE PRESSURE HALF-TIME: 83 MS
MR FLOW: 30 CM3
MR MEAN GRADIENT: 66 MMHG
MR MEAN VELOCITY: 393 CM/S
MR PEAK GRADIENT: 91.4 MMHG
MR PISA EROA: 0.2 CM2
MR PISA RADIUS: 0.6 CM
MR PISA VN NYQUIST: 38.5 CM/S
MV AREA VTI: 1.33 CM2
MV AVERAGE E/E' RATIO: 15.2 CM/S
MV DECELERATION TIME: 201 MS
MV E'TISSUE VEL-LAT: 8.05 CM/S
MV E'TISSUE VEL-MED: 4.78 CM/S
MV LATERAL E/E' RATIO: 12.1
MV MEAN GRADIENT: 2 MMHG
MV MEDIAL E/E' RATIO: 20.3
MV PEAK A VELOCITY: 85.4 CM/S
MV PEAK E VELOCITY: 97.2 CM/S
MV PEAK GRADIENT: 6.8 MMHG
MV REGURGITANT VOLUME: 30.4 CC
MV VALVE AREA BY CONTINUITY EQUATION: 1.3 CM2
MV VALVE AREA PRESSURE 1/2 METHOD: 2.7 CM2
NUC REST DIASTOLIC VOLUME INDEX: 3072 LBS
NUC REST SYSTOLIC VOLUME INDEX: 61 IN
PISA MR PEAK VEL: 478 CM/S
TRICUSPID REGURGITATION PEAK PRESSURE GRADIENT: 33.2 MMHG
TRICUSPID VALVE ANULAR PLANE SYSTOLIC EXCURSION: 2 CM
TRICUSPID VALVE PEAK REGURGITANT VELOCITY: 288 CM/S

## 2019-02-27 ASSESSMENT — MIFFLIN-ST. JEOR: SCORE: 1278.29

## 2019-03-04 ENCOUNTER — AMBULATORY - HEALTHEAST (OUTPATIENT)
Dept: FAMILY MEDICINE | Facility: CLINIC | Age: 79
End: 2019-03-04

## 2019-03-04 DIAGNOSIS — I50.9 ACUTE CONGESTIVE HEART FAILURE, UNSPECIFIED HEART FAILURE TYPE (H): ICD-10-CM

## 2019-03-10 ENCOUNTER — COMMUNICATION - HEALTHEAST (OUTPATIENT)
Dept: FAMILY MEDICINE | Facility: CLINIC | Age: 79
End: 2019-03-10

## 2019-03-10 DIAGNOSIS — I10 HTN (HYPERTENSION): ICD-10-CM

## 2019-03-20 ENCOUNTER — OFFICE VISIT - HEALTHEAST (OUTPATIENT)
Dept: FAMILY MEDICINE | Facility: CLINIC | Age: 79
End: 2019-03-20

## 2019-03-20 ENCOUNTER — COMMUNICATION - HEALTHEAST (OUTPATIENT)
Dept: FAMILY MEDICINE | Facility: CLINIC | Age: 79
End: 2019-03-20

## 2019-03-20 DIAGNOSIS — E11.40 DIABETIC NEUROPATHY (H): ICD-10-CM

## 2019-03-20 DIAGNOSIS — H04.123 DRY EYES: ICD-10-CM

## 2019-03-20 DIAGNOSIS — I51.89 DIASTOLIC DYSFUNCTION: ICD-10-CM

## 2019-03-20 DIAGNOSIS — G62.9 NEUROPATHY: ICD-10-CM

## 2019-03-20 DIAGNOSIS — M15.9 OSTEOARTHRITIS OF MULTIPLE JOINTS, UNSPECIFIED OSTEOARTHRITIS TYPE: ICD-10-CM

## 2019-03-21 ENCOUNTER — COMMUNICATION - HEALTHEAST (OUTPATIENT)
Dept: FAMILY MEDICINE | Facility: CLINIC | Age: 79
End: 2019-03-21

## 2019-03-21 DIAGNOSIS — E11.40 DIABETIC NEUROPATHY (H): ICD-10-CM

## 2019-04-09 ENCOUNTER — COMMUNICATION - HEALTHEAST (OUTPATIENT)
Dept: FAMILY MEDICINE | Facility: CLINIC | Age: 79
End: 2019-04-09

## 2019-04-09 DIAGNOSIS — R06.09 DYSPNEA ON EXERTION: ICD-10-CM

## 2019-04-19 ENCOUNTER — COMMUNICATION - HEALTHEAST (OUTPATIENT)
Dept: FAMILY MEDICINE | Facility: CLINIC | Age: 79
End: 2019-04-19

## 2019-04-19 DIAGNOSIS — F41.1 ANXIETY STATE: ICD-10-CM

## 2019-04-19 DIAGNOSIS — F32.9 MAJOR DEPRESSIVE DISORDER, REMISSION STATUS UNSPECIFIED, UNSPECIFIED WHETHER RECURRENT: ICD-10-CM

## 2019-04-24 ENCOUNTER — OFFICE VISIT - HEALTHEAST (OUTPATIENT)
Dept: FAMILY MEDICINE | Facility: CLINIC | Age: 79
End: 2019-04-24

## 2019-04-24 ENCOUNTER — COMMUNICATION - HEALTHEAST (OUTPATIENT)
Dept: FAMILY MEDICINE | Facility: CLINIC | Age: 79
End: 2019-04-24

## 2019-04-24 DIAGNOSIS — J98.01 BRONCHOSPASM: ICD-10-CM

## 2019-04-24 DIAGNOSIS — I50.9 ACUTE CONGESTIVE HEART FAILURE, UNSPECIFIED HEART FAILURE TYPE (H): ICD-10-CM

## 2019-04-24 DIAGNOSIS — J18.9 PNEUMONIA DUE TO INFECTIOUS ORGANISM, UNSPECIFIED LATERALITY, UNSPECIFIED PART OF LUNG: ICD-10-CM

## 2019-05-10 ENCOUNTER — OFFICE VISIT - HEALTHEAST (OUTPATIENT)
Dept: FAMILY MEDICINE | Facility: CLINIC | Age: 79
End: 2019-05-10

## 2019-05-10 DIAGNOSIS — R06.02 SHORTNESS OF BREATH: ICD-10-CM

## 2019-05-10 LAB
ATRIAL RATE - MUSE: 78 BPM
DIASTOLIC BLOOD PRESSURE - MUSE: NORMAL MMHG
FLUAV AG SPEC QL IA: NORMAL
FLUBV AG SPEC QL IA: NORMAL
INTERPRETATION ECG - MUSE: NORMAL
P AXIS - MUSE: 41 DEGREES
PR INTERVAL - MUSE: 136 MS
QRS DURATION - MUSE: 126 MS
QT - MUSE: 398 MS
QTC - MUSE: 453 MS
R AXIS - MUSE: 61 DEGREES
SYSTOLIC BLOOD PRESSURE - MUSE: NORMAL MMHG
T AXIS - MUSE: 12 DEGREES
VENTRICULAR RATE- MUSE: 78 BPM

## 2019-05-11 ENCOUNTER — HOME CARE/HOSPICE - HEALTHEAST (OUTPATIENT)
Dept: HOME HEALTH SERVICES | Facility: HOME HEALTH | Age: 79
End: 2019-05-11

## 2019-05-13 ENCOUNTER — COMMUNICATION - HEALTHEAST (OUTPATIENT)
Dept: CARE COORDINATION | Facility: CLINIC | Age: 79
End: 2019-05-13

## 2019-05-15 ENCOUNTER — OFFICE VISIT - HEALTHEAST (OUTPATIENT)
Dept: FAMILY MEDICINE | Facility: CLINIC | Age: 79
End: 2019-05-15

## 2019-05-15 ENCOUNTER — COMMUNICATION - HEALTHEAST (OUTPATIENT)
Dept: PULMONOLOGY | Facility: OTHER | Age: 79
End: 2019-05-15

## 2019-05-15 ENCOUNTER — AMBULATORY - HEALTHEAST (OUTPATIENT)
Dept: PULMONOLOGY | Facility: OTHER | Age: 79
End: 2019-05-15

## 2019-05-15 DIAGNOSIS — Z09 HOSPITAL DISCHARGE FOLLOW-UP: ICD-10-CM

## 2019-05-15 DIAGNOSIS — J44.9 COPD (CHRONIC OBSTRUCTIVE PULMONARY DISEASE) (H): ICD-10-CM

## 2019-05-15 DIAGNOSIS — G62.9 NEUROPATHY: ICD-10-CM

## 2019-05-15 ASSESSMENT — MIFFLIN-ST. JEOR: SCORE: 1237.46

## 2019-05-17 ENCOUNTER — AMBULATORY - HEALTHEAST (OUTPATIENT)
Dept: PHARMACY | Facility: CLINIC | Age: 79
End: 2019-05-17

## 2019-05-17 DIAGNOSIS — J30.1 SEASONAL ALLERGIC RHINITIS DUE TO POLLEN: ICD-10-CM

## 2019-05-17 DIAGNOSIS — K21.9 GASTROESOPHAGEAL REFLUX DISEASE, ESOPHAGITIS PRESENCE NOT SPECIFIED: ICD-10-CM

## 2019-05-17 DIAGNOSIS — R32 URINARY INCONTINENCE, UNSPECIFIED TYPE: ICD-10-CM

## 2019-05-17 DIAGNOSIS — M94.9 DISORDER OF BONE AND CARTILAGE: ICD-10-CM

## 2019-05-17 DIAGNOSIS — J44.1 CHRONIC OBSTRUCTIVE PULMONARY DISEASE WITH ACUTE EXACERBATION (H): ICD-10-CM

## 2019-05-17 DIAGNOSIS — M89.9 DISORDER OF BONE AND CARTILAGE: ICD-10-CM

## 2019-05-17 DIAGNOSIS — M17.10 PRIMARY LOCALIZED OSTEOARTHROSIS OF LOWER LEG, UNSPECIFIED LATERALITY: ICD-10-CM

## 2019-05-17 DIAGNOSIS — G47.00 INSOMNIA, UNSPECIFIED TYPE: ICD-10-CM

## 2019-05-17 DIAGNOSIS — F32.9 MAJOR DEPRESSIVE DISORDER, REMISSION STATUS UNSPECIFIED, UNSPECIFIED WHETHER RECURRENT: ICD-10-CM

## 2019-05-17 DIAGNOSIS — I10 ESSENTIAL HYPERTENSION, BENIGN: ICD-10-CM

## 2019-05-17 DIAGNOSIS — I51.89 DIASTOLIC DYSFUNCTION: ICD-10-CM

## 2019-05-21 ENCOUNTER — OFFICE VISIT - HEALTHEAST (OUTPATIENT)
Dept: FAMILY MEDICINE | Facility: CLINIC | Age: 79
End: 2019-05-21

## 2019-05-21 DIAGNOSIS — J84.10 PULMONARY FIBROSIS (H): ICD-10-CM

## 2019-05-21 DIAGNOSIS — G62.9 NEUROPATHY: ICD-10-CM

## 2019-05-21 DIAGNOSIS — F32.4 MAJOR DEPRESSIVE DISORDER IN PARTIAL REMISSION, UNSPECIFIED WHETHER RECURRENT (H): ICD-10-CM

## 2019-05-21 DIAGNOSIS — J44.1 CHRONIC OBSTRUCTIVE PULMONARY DISEASE WITH ACUTE EXACERBATION (H): ICD-10-CM

## 2019-05-21 DIAGNOSIS — I51.89 DIASTOLIC DYSFUNCTION: ICD-10-CM

## 2019-05-21 ASSESSMENT — MIFFLIN-ST. JEOR: SCORE: 1237.46

## 2019-06-09 ENCOUNTER — COMMUNICATION - HEALTHEAST (OUTPATIENT)
Dept: FAMILY MEDICINE | Facility: CLINIC | Age: 79
End: 2019-06-09

## 2019-06-09 DIAGNOSIS — R52 PAIN: ICD-10-CM

## 2019-06-11 ENCOUNTER — RECORDS - HEALTHEAST (OUTPATIENT)
Dept: ADMINISTRATIVE | Facility: OTHER | Age: 79
End: 2019-06-11

## 2019-06-11 ENCOUNTER — RECORDS - HEALTHEAST (OUTPATIENT)
Dept: PULMONOLOGY | Facility: OTHER | Age: 79
End: 2019-06-11

## 2019-06-11 DIAGNOSIS — J44.9 CHRONIC OBSTRUCTIVE PULMONARY DISEASE, UNSPECIFIED (H): ICD-10-CM

## 2019-06-14 ENCOUNTER — COMMUNICATION - HEALTHEAST (OUTPATIENT)
Dept: FAMILY MEDICINE | Facility: CLINIC | Age: 79
End: 2019-06-14

## 2019-06-14 DIAGNOSIS — F32.4 MAJOR DEPRESSIVE DISORDER IN PARTIAL REMISSION, UNSPECIFIED WHETHER RECURRENT (H): ICD-10-CM

## 2019-06-25 ENCOUNTER — OFFICE VISIT - HEALTHEAST (OUTPATIENT)
Dept: PULMONOLOGY | Facility: OTHER | Age: 79
End: 2019-06-25

## 2019-06-25 DIAGNOSIS — J84.9 ILD (INTERSTITIAL LUNG DISEASE) (H): ICD-10-CM

## 2019-06-25 ASSESSMENT — MIFFLIN-ST. JEOR: SCORE: 1254.7

## 2019-07-01 ENCOUNTER — OFFICE VISIT - HEALTHEAST (OUTPATIENT)
Dept: FAMILY MEDICINE | Facility: CLINIC | Age: 79
End: 2019-07-01

## 2019-07-01 DIAGNOSIS — J84.10 PULMONARY FIBROSIS (H): ICD-10-CM

## 2019-07-01 DIAGNOSIS — G62.9 NEUROPATHY: ICD-10-CM

## 2019-07-01 DIAGNOSIS — F32.4 MAJOR DEPRESSIVE DISORDER IN PARTIAL REMISSION, UNSPECIFIED WHETHER RECURRENT (H): ICD-10-CM

## 2019-07-01 ASSESSMENT — MIFFLIN-ST. JEOR: SCORE: 1276.02

## 2019-07-23 ENCOUNTER — COMMUNICATION - HEALTHEAST (OUTPATIENT)
Dept: FAMILY MEDICINE | Facility: CLINIC | Age: 79
End: 2019-07-23

## 2019-07-23 DIAGNOSIS — G62.9 NEUROPATHY: ICD-10-CM

## 2019-07-23 DIAGNOSIS — M15.9 OSTEOARTHRITIS OF MULTIPLE JOINTS, UNSPECIFIED OSTEOARTHRITIS TYPE: ICD-10-CM

## 2019-07-29 ENCOUNTER — COMMUNICATION - HEALTHEAST (OUTPATIENT)
Dept: FAMILY MEDICINE | Facility: CLINIC | Age: 79
End: 2019-07-29

## 2019-07-29 DIAGNOSIS — G62.9 NEUROPATHY: ICD-10-CM

## 2019-07-30 ENCOUNTER — COMMUNICATION - HEALTHEAST (OUTPATIENT)
Dept: FAMILY MEDICINE | Facility: CLINIC | Age: 79
End: 2019-07-30

## 2019-07-30 DIAGNOSIS — I50.9 ACUTE CONGESTIVE HEART FAILURE, UNSPECIFIED HEART FAILURE TYPE (H): ICD-10-CM

## 2019-08-15 ENCOUNTER — COMMUNICATION - HEALTHEAST (OUTPATIENT)
Dept: FAMILY MEDICINE | Facility: CLINIC | Age: 79
End: 2019-08-15

## 2019-08-15 DIAGNOSIS — K21.9 GERD (GASTROESOPHAGEAL REFLUX DISEASE): ICD-10-CM

## 2019-08-22 ENCOUNTER — COMMUNICATION - HEALTHEAST (OUTPATIENT)
Dept: FAMILY MEDICINE | Facility: CLINIC | Age: 79
End: 2019-08-22

## 2019-08-22 DIAGNOSIS — G62.9 NEUROPATHY: ICD-10-CM

## 2019-09-17 ENCOUNTER — COMMUNICATION - HEALTHEAST (OUTPATIENT)
Dept: PHARMACY | Facility: CLINIC | Age: 79
End: 2019-09-17

## 2019-09-24 ENCOUNTER — COMMUNICATION - HEALTHEAST (OUTPATIENT)
Dept: FAMILY MEDICINE | Facility: CLINIC | Age: 79
End: 2019-09-24

## 2019-09-24 DIAGNOSIS — G62.9 NEUROPATHY: ICD-10-CM

## 2019-10-01 ENCOUNTER — COMMUNICATION - HEALTHEAST (OUTPATIENT)
Dept: FAMILY MEDICINE | Facility: CLINIC | Age: 79
End: 2019-10-01

## 2019-10-01 DIAGNOSIS — I50.9 ACUTE CONGESTIVE HEART FAILURE, UNSPECIFIED HEART FAILURE TYPE (H): ICD-10-CM

## 2019-10-21 ENCOUNTER — COMMUNICATION - HEALTHEAST (OUTPATIENT)
Dept: FAMILY MEDICINE | Facility: CLINIC | Age: 79
End: 2019-10-21

## 2019-10-21 DIAGNOSIS — G62.9 NEUROPATHY: ICD-10-CM

## 2019-10-23 ENCOUNTER — AMBULATORY - HEALTHEAST (OUTPATIENT)
Dept: FAMILY MEDICINE | Facility: CLINIC | Age: 79
End: 2019-10-23

## 2019-10-23 DIAGNOSIS — G62.9 NEUROPATHY: ICD-10-CM

## 2019-11-15 ENCOUNTER — COMMUNICATION - HEALTHEAST (OUTPATIENT)
Dept: FAMILY MEDICINE | Facility: CLINIC | Age: 79
End: 2019-11-15

## 2019-11-15 DIAGNOSIS — J30.9 ALLERGIC RHINITIS: ICD-10-CM

## 2019-11-20 ENCOUNTER — COMMUNICATION - HEALTHEAST (OUTPATIENT)
Dept: FAMILY MEDICINE | Facility: CLINIC | Age: 79
End: 2019-11-20

## 2019-11-20 DIAGNOSIS — G62.9 NEUROPATHY: ICD-10-CM

## 2019-11-27 ENCOUNTER — COMMUNICATION - HEALTHEAST (OUTPATIENT)
Dept: FAMILY MEDICINE | Facility: CLINIC | Age: 79
End: 2019-11-27

## 2019-11-27 DIAGNOSIS — I10 HTN (HYPERTENSION): ICD-10-CM

## 2019-11-29 ENCOUNTER — COMMUNICATION - HEALTHEAST (OUTPATIENT)
Dept: FAMILY MEDICINE | Facility: CLINIC | Age: 79
End: 2019-11-29

## 2019-11-29 DIAGNOSIS — R52 PAIN: ICD-10-CM

## 2019-12-19 ENCOUNTER — COMMUNICATION - HEALTHEAST (OUTPATIENT)
Dept: FAMILY MEDICINE | Facility: CLINIC | Age: 79
End: 2019-12-19

## 2019-12-19 DIAGNOSIS — G62.9 NEUROPATHY: ICD-10-CM

## 2020-01-01 ENCOUNTER — COMMUNICATION - HEALTHEAST (OUTPATIENT)
Dept: FAMILY MEDICINE | Facility: CLINIC | Age: 80
End: 2020-01-01

## 2020-01-01 ENCOUNTER — OFFICE VISIT - HEALTHEAST (OUTPATIENT)
Dept: FAMILY MEDICINE | Facility: CLINIC | Age: 80
End: 2020-01-01

## 2020-01-01 ENCOUNTER — COMMUNICATION - HEALTHEAST (OUTPATIENT)
Dept: SCHEDULING | Facility: CLINIC | Age: 80
End: 2020-01-01

## 2020-01-01 ENCOUNTER — AMBULATORY - HEALTHEAST (OUTPATIENT)
Dept: PHARMACY | Facility: CLINIC | Age: 80
End: 2020-01-01

## 2020-01-01 DIAGNOSIS — F32.4 MAJOR DEPRESSIVE DISORDER IN PARTIAL REMISSION, UNSPECIFIED WHETHER RECURRENT (H): ICD-10-CM

## 2020-01-01 DIAGNOSIS — M54.50 BILATERAL LOW BACK PAIN WITHOUT SCIATICA, UNSPECIFIED CHRONICITY: ICD-10-CM

## 2020-01-01 DIAGNOSIS — I10 HTN (HYPERTENSION): ICD-10-CM

## 2020-01-01 DIAGNOSIS — I07.1 TRICUSPID VALVE INSUFFICIENCY, UNSPECIFIED ETIOLOGY: ICD-10-CM

## 2020-01-01 DIAGNOSIS — R73.09 ELEVATED GLUCOSE: ICD-10-CM

## 2020-01-01 DIAGNOSIS — G62.9 NEUROPATHY: ICD-10-CM

## 2020-01-01 DIAGNOSIS — K21.9 GASTROESOPHAGEAL REFLUX DISEASE, ESOPHAGITIS PRESENCE NOT SPECIFIED: ICD-10-CM

## 2020-01-01 DIAGNOSIS — M94.9 DISORDER OF BONE AND CARTILAGE: ICD-10-CM

## 2020-01-01 DIAGNOSIS — J30.9 ALLERGIC RHINITIS: ICD-10-CM

## 2020-01-01 DIAGNOSIS — G47.00 INSOMNIA, UNSPECIFIED TYPE: ICD-10-CM

## 2020-01-01 DIAGNOSIS — J30.1 SEASONAL ALLERGIC RHINITIS DUE TO POLLEN: ICD-10-CM

## 2020-01-01 DIAGNOSIS — N10 PYELONEPHRITIS, ACUTE: ICD-10-CM

## 2020-01-01 DIAGNOSIS — I51.89 DIASTOLIC DYSFUNCTION: ICD-10-CM

## 2020-01-01 DIAGNOSIS — N17.9 ACUTE KIDNEY INJURY (H): ICD-10-CM

## 2020-01-01 DIAGNOSIS — I10 ESSENTIAL HYPERTENSION, BENIGN: ICD-10-CM

## 2020-01-01 DIAGNOSIS — M15.9 OSTEOARTHRITIS OF MULTIPLE JOINTS, UNSPECIFIED OSTEOARTHRITIS TYPE: ICD-10-CM

## 2020-01-01 DIAGNOSIS — M89.9 DISORDER OF BONE AND CARTILAGE: ICD-10-CM

## 2020-01-01 DIAGNOSIS — M54.9 BACK PAIN: ICD-10-CM

## 2020-01-01 DIAGNOSIS — R52 PAIN: ICD-10-CM

## 2020-01-01 DIAGNOSIS — R42 DIZZINESS: ICD-10-CM

## 2020-01-01 DIAGNOSIS — R35.0 FREQUENT URINATION: ICD-10-CM

## 2020-01-01 DIAGNOSIS — K21.9 GERD (GASTROESOPHAGEAL REFLUX DISEASE): ICD-10-CM

## 2020-01-01 DIAGNOSIS — R81 GLYCOSURIA: ICD-10-CM

## 2020-01-01 DIAGNOSIS — J44.1 CHRONIC OBSTRUCTIVE PULMONARY DISEASE WITH ACUTE EXACERBATION (H): ICD-10-CM

## 2020-01-01 DIAGNOSIS — J84.10 PULMONARY FIBROSIS (H): ICD-10-CM

## 2020-01-01 LAB
ALBUMIN UR-MCNC: ABNORMAL MG/DL
ALBUMIN UR-MCNC: NEGATIVE MG/DL
ANION GAP SERPL CALCULATED.3IONS-SCNC: 11 MMOL/L (ref 5–18)
APPEARANCE UR: CLEAR
APPEARANCE UR: CLEAR
BACTERIA #/AREA URNS HPF: ABNORMAL /[HPF]
BACTERIA SPEC CULT: NO GROWTH
BACTERIA SPEC CULT: NORMAL
BILIRUB UR QL STRIP: ABNORMAL
BILIRUB UR QL STRIP: NEGATIVE
BUN SERPL-MCNC: 30 MG/DL (ref 8–28)
CALCIUM SERPL-MCNC: 9.6 MG/DL (ref 8.5–10.5)
CHLORIDE BLD-SCNC: 110 MMOL/L (ref 98–107)
CO2 SERPL-SCNC: 21 MMOL/L (ref 22–31)
COLOR UR AUTO: YELLOW
COLOR UR AUTO: YELLOW
CREAT SERPL-MCNC: 1.5 MG/DL (ref 0.6–1.1)
FASTING STATUS PATIENT QL REPORTED: NO
GFR SERPL CREATININE-BSD FRML MDRD: 33 ML/MIN/1.73M2
GLUCOSE BLD-MCNC: 118 MG/DL (ref 70–125)
GLUCOSE BLD-MCNC: 129 MG/DL (ref 74–125)
GLUCOSE UR STRIP-MCNC: ABNORMAL MG/DL
GLUCOSE UR STRIP-MCNC: NEGATIVE MG/DL
HBA1C MFR BLD: 5.7 % (ref 3.5–6)
HGB UR QL STRIP: ABNORMAL
HGB UR QL STRIP: NEGATIVE
KETONES UR STRIP-MCNC: NEGATIVE MG/DL
KETONES UR STRIP-MCNC: NEGATIVE MG/DL
LEUKOCYTE ESTERASE UR QL STRIP: ABNORMAL
LEUKOCYTE ESTERASE UR QL STRIP: NEGATIVE
NITRATE UR QL: NEGATIVE
NITRATE UR QL: NEGATIVE
PH UR STRIP: 5 [PH] (ref 5–8)
PH UR STRIP: 5.5 [PH] (ref 5–8)
POTASSIUM BLD-SCNC: 5 MMOL/L (ref 3.5–5)
RBC #/AREA URNS AUTO: ABNORMAL /[HPF]
SODIUM SERPL-SCNC: 142 MMOL/L (ref 136–145)
SP GR UR STRIP: 1.02 (ref 1–1.03)
SP GR UR STRIP: >=1.03 (ref 1–1.03)
SQUAMOUS #/AREA URNS AUTO: ABNORMAL /[HPF]
UROBILINOGEN UR STRIP-ACNC: ABNORMAL
UROBILINOGEN UR STRIP-ACNC: ABNORMAL
WBC #/AREA URNS AUTO: ABNORMAL /[HPF]

## 2020-01-01 ASSESSMENT — PATIENT HEALTH QUESTIONNAIRE - PHQ9: SUM OF ALL RESPONSES TO PHQ QUESTIONS 1-9: 0

## 2020-01-01 ASSESSMENT — MIFFLIN-ST. JEOR: SCORE: 1290.04

## 2020-01-13 ENCOUNTER — OFFICE VISIT - HEALTHEAST (OUTPATIENT)
Dept: FAMILY MEDICINE | Facility: CLINIC | Age: 80
End: 2020-01-13

## 2020-01-13 DIAGNOSIS — R73.03 PREDIABETES: ICD-10-CM

## 2020-01-13 DIAGNOSIS — E83.50 UNSPECIFIED DISORDER OF CALCIUM METABOLISM: ICD-10-CM

## 2020-01-13 DIAGNOSIS — R79.9 ABNORMAL FINDING OF BLOOD CHEMISTRY, UNSPECIFIED: ICD-10-CM

## 2020-01-13 DIAGNOSIS — M15.9 OSTEOARTHRITIS OF MULTIPLE JOINTS, UNSPECIFIED OSTEOARTHRITIS TYPE: ICD-10-CM

## 2020-01-13 DIAGNOSIS — J84.10 PULMONARY FIBROSIS (H): ICD-10-CM

## 2020-01-13 DIAGNOSIS — G62.9 NEUROPATHY: ICD-10-CM

## 2020-01-13 DIAGNOSIS — I10 ESSENTIAL HYPERTENSION, BENIGN: ICD-10-CM

## 2020-01-13 LAB
HBA1C MFR BLD: 5.6 % (ref 3.5–6)
TSH SERPL DL<=0.005 MIU/L-ACNC: 1.63 UIU/ML (ref 0.3–5)

## 2020-01-13 ASSESSMENT — MIFFLIN-ST. JEOR: SCORE: 1260.14

## 2020-01-13 ASSESSMENT — PATIENT HEALTH QUESTIONNAIRE - PHQ9: SUM OF ALL RESPONSES TO PHQ QUESTIONS 1-9: 1

## 2020-01-14 ENCOUNTER — COMMUNICATION - HEALTHEAST (OUTPATIENT)
Dept: FAMILY MEDICINE | Facility: CLINIC | Age: 80
End: 2020-01-14

## 2020-01-14 LAB — 25(OH)D3 SERPL-MCNC: 57.3 NG/ML (ref 30–80)

## 2020-02-14 ENCOUNTER — COMMUNICATION - HEALTHEAST (OUTPATIENT)
Dept: FAMILY MEDICINE | Facility: CLINIC | Age: 80
End: 2020-02-14

## 2020-02-14 DIAGNOSIS — F32.4 MAJOR DEPRESSIVE DISORDER IN PARTIAL REMISSION, UNSPECIFIED WHETHER RECURRENT (H): ICD-10-CM

## 2020-03-06 ENCOUNTER — COMMUNICATION - HEALTHEAST (OUTPATIENT)
Dept: FAMILY MEDICINE | Facility: CLINIC | Age: 80
End: 2020-03-06

## 2020-03-06 DIAGNOSIS — N32.81 OVERACTIVE BLADDER: ICD-10-CM

## 2020-03-06 DIAGNOSIS — N39.3 FEMALE STRESS INCONTINENCE: ICD-10-CM

## 2021-01-01 ENCOUNTER — COMMUNICATION - HEALTHEAST (OUTPATIENT)
Dept: FAMILY MEDICINE | Facility: CLINIC | Age: 81
End: 2021-01-01

## 2021-01-01 ENCOUNTER — RECORDS - HEALTHEAST (OUTPATIENT)
Dept: ADMINISTRATIVE | Facility: OTHER | Age: 81
End: 2021-01-01

## 2021-01-01 ENCOUNTER — HOME CARE/HOSPICE - HEALTHEAST (OUTPATIENT)
Dept: HOME HEALTH SERVICES | Facility: HOME HEALTH | Age: 81
End: 2021-01-01

## 2021-01-01 ENCOUNTER — ANESTHESIA - HEALTHEAST (OUTPATIENT)
Dept: MRI IMAGING | Facility: HOSPITAL | Age: 81
End: 2021-01-01

## 2021-01-01 ENCOUNTER — AMBULATORY - HEALTHEAST (OUTPATIENT)
Dept: LAB | Facility: CLINIC | Age: 81
End: 2021-01-01

## 2021-01-01 ENCOUNTER — COMMUNICATION - HEALTHEAST (OUTPATIENT)
Dept: SCHEDULING | Facility: CLINIC | Age: 81
End: 2021-01-01

## 2021-01-01 ENCOUNTER — TELEPHONE (OUTPATIENT)
Facility: CLINIC | Age: 81
End: 2021-01-01

## 2021-01-01 ENCOUNTER — OFFICE VISIT - HEALTHEAST (OUTPATIENT)
Dept: FAMILY MEDICINE | Facility: CLINIC | Age: 81
End: 2021-01-01

## 2021-01-01 ENCOUNTER — HEALTH MAINTENANCE LETTER (OUTPATIENT)
Age: 81
End: 2021-01-01

## 2021-01-01 ENCOUNTER — AMBULATORY - HEALTHEAST (OUTPATIENT)
Dept: FAMILY MEDICINE | Facility: CLINIC | Age: 81
End: 2021-01-01

## 2021-01-01 ENCOUNTER — COMMUNICATION - HEALTHEAST (OUTPATIENT)
Dept: HOME HEALTH SERVICES | Facility: HOME HEALTH | Age: 81
End: 2021-01-01

## 2021-01-01 ENCOUNTER — APPOINTMENT (OUTPATIENT)
Dept: ULTRASOUND IMAGING | Facility: CLINIC | Age: 81
DRG: 300 | End: 2021-01-01
Attending: STUDENT IN AN ORGANIZED HEALTH CARE EDUCATION/TRAINING PROGRAM
Payer: COMMERCIAL

## 2021-01-01 ENCOUNTER — VIRTUAL VISIT (OUTPATIENT)
Dept: NEPHROLOGY | Facility: CLINIC | Age: 81
End: 2021-01-01
Attending: STUDENT IN AN ORGANIZED HEALTH CARE EDUCATION/TRAINING PROGRAM
Payer: COMMERCIAL

## 2021-01-01 ENCOUNTER — TELEPHONE (OUTPATIENT)
Dept: RHEUMATOLOGY | Facility: CLINIC | Age: 81
End: 2021-01-01

## 2021-01-01 ENCOUNTER — PATIENT OUTREACH (OUTPATIENT)
Dept: CARE COORDINATION | Facility: CLINIC | Age: 81
End: 2021-01-01

## 2021-01-01 ENCOUNTER — PRE VISIT (OUTPATIENT)
Dept: ONCOLOGY | Facility: CLINIC | Age: 81
End: 2021-01-01

## 2021-01-01 ENCOUNTER — TELEPHONE (OUTPATIENT)
Dept: DERMATOLOGY | Facility: CLINIC | Age: 81
End: 2021-01-01

## 2021-01-01 ENCOUNTER — HOSPITAL ENCOUNTER (INPATIENT)
Facility: CLINIC | Age: 81
LOS: 5 days | Discharge: HOME | DRG: 300 | End: 2021-01-22
Attending: INTERNAL MEDICINE | Admitting: HOSPITALIST
Payer: COMMERCIAL

## 2021-01-01 VITALS — HEIGHT: 61 IN | WEIGHT: 174 LBS | BODY MASS INDEX: 32.85 KG/M2

## 2021-01-01 VITALS
RESPIRATION RATE: 17 BRPM | SYSTOLIC BLOOD PRESSURE: 132 MMHG | BODY MASS INDEX: 35.06 KG/M2 | OXYGEN SATURATION: 97 % | HEART RATE: 64 BPM | TEMPERATURE: 98.9 F | HEIGHT: 61 IN | DIASTOLIC BLOOD PRESSURE: 82 MMHG | WEIGHT: 185.7 LBS

## 2021-01-01 DIAGNOSIS — N17.9 ACUTE RENAL FAILURE, UNSPECIFIED ACUTE RENAL FAILURE TYPE (H): ICD-10-CM

## 2021-01-01 DIAGNOSIS — R53.1 GENERALIZED WEAKNESS: ICD-10-CM

## 2021-01-01 DIAGNOSIS — L03.116 CELLULITIS OF LEFT FOOT: ICD-10-CM

## 2021-01-01 DIAGNOSIS — F32.4 MAJOR DEPRESSIVE DISORDER IN PARTIAL REMISSION, UNSPECIFIED WHETHER RECURRENT (H): ICD-10-CM

## 2021-01-01 DIAGNOSIS — I89.0 LYMPHEDEMA: ICD-10-CM

## 2021-01-01 DIAGNOSIS — L98.9 FOOT LESION: ICD-10-CM

## 2021-01-01 DIAGNOSIS — N18.30 STAGE 3 CHRONIC KIDNEY DISEASE, UNSPECIFIED WHETHER STAGE 3A OR 3B CKD (H): ICD-10-CM

## 2021-01-01 DIAGNOSIS — N32.81 OVERACTIVE BLADDER: ICD-10-CM

## 2021-01-01 DIAGNOSIS — R05.9 COUGH: ICD-10-CM

## 2021-01-01 DIAGNOSIS — E87.20 METABOLIC ACIDOSIS: ICD-10-CM

## 2021-01-01 DIAGNOSIS — I77.6 VASCULITIS (H): ICD-10-CM

## 2021-01-01 DIAGNOSIS — N17.9 ACUTE KIDNEY INJURY (H): Primary | ICD-10-CM

## 2021-01-01 DIAGNOSIS — J44.1 CHRONIC OBSTRUCTIVE PULMONARY DISEASE WITH ACUTE EXACERBATION (H): ICD-10-CM

## 2021-01-01 DIAGNOSIS — I50.9 ACUTE CONGESTIVE HEART FAILURE, UNSPECIFIED HEART FAILURE TYPE (H): ICD-10-CM

## 2021-01-01 DIAGNOSIS — R21 RASH: ICD-10-CM

## 2021-01-01 DIAGNOSIS — L03.119 CELLULITIS OF FOOT: ICD-10-CM

## 2021-01-01 DIAGNOSIS — D50.8 OTHER IRON DEFICIENCY ANEMIA: ICD-10-CM

## 2021-01-01 DIAGNOSIS — R80.9 NEPHROTIC RANGE PROTEINURIA: ICD-10-CM

## 2021-01-01 DIAGNOSIS — G62.9 NEUROPATHY: ICD-10-CM

## 2021-01-01 DIAGNOSIS — N17.9 ACUTE KIDNEY INJURY (H): ICD-10-CM

## 2021-01-01 DIAGNOSIS — R52 PAIN: ICD-10-CM

## 2021-01-01 DIAGNOSIS — R21 RASH: Primary | ICD-10-CM

## 2021-01-01 DIAGNOSIS — E55.9 VITAMIN D DEFICIENCY: ICD-10-CM

## 2021-01-01 DIAGNOSIS — L97.529 ULCER OF TOE OF LEFT FOOT, UNSPECIFIED ULCER STAGE (H): ICD-10-CM

## 2021-01-01 DIAGNOSIS — N39.3 FEMALE STRESS INCONTINENCE: ICD-10-CM

## 2021-01-01 LAB
ALBUMIN MFR UR ELPH: 80.8 %
ALBUMIN SERPL ELPH-MCNC: 2.3 G/DL (ref 3.7–5.1)
ALBUMIN SERPL-MCNC: 2.1 G/DL (ref 3.4–5)
ALBUMIN SERPL-MCNC: 2.1 G/DL (ref 3.4–5)
ALBUMIN SERPL-MCNC: 2.2 G/DL (ref 3.4–5)
ALBUMIN SERPL-MCNC: 2.3 G/DL (ref 3.4–5)
ALBUMIN UR-MCNC: 100 MG/DL
ALP SERPL-CCNC: 83 U/L (ref 40–150)
ALPHA1 GLOB MFR UR ELPH: 3 %
ALPHA1 GLOB SERPL ELPH-MCNC: 0.5 G/DL (ref 0.2–0.4)
ALPHA2 GLOB MFR UR ELPH: 5.6 %
ALPHA2 GLOB SERPL ELPH-MCNC: 1 G/DL (ref 0.5–0.9)
ALT SERPL W P-5'-P-CCNC: 14 U/L (ref 0–50)
ANA PAT SER IF-IMP: ABNORMAL
ANA SER QL IF: ABNORMAL
ANA TITR SER IF: ABNORMAL {TITER}
ANCA AB PATTERN SER IF-IMP: NORMAL
ANION GAP SERPL CALCULATED.3IONS-SCNC: 10 MMOL/L (ref 3–14)
ANION GAP SERPL CALCULATED.3IONS-SCNC: 11 MMOL/L (ref 3–14)
ANION GAP SERPL CALCULATED.3IONS-SCNC: 14 MMOL/L (ref 3–14)
ANION GAP SERPL CALCULATED.3IONS-SCNC: 7 MMOL/L (ref 3–14)
ANION GAP SERPL CALCULATED.3IONS-SCNC: 8 MMOL/L (ref 3–14)
ANION GAP SERPL CALCULATED.3IONS-SCNC: 9 MMOL/L (ref 3–14)
APPEARANCE UR: CLEAR
AST SERPL W P-5'-P-CCNC: 23 U/L (ref 0–45)
B-GLOBULIN MFR UR ELPH: 4.9 %
B-GLOBULIN SERPL ELPH-MCNC: 0.7 G/DL (ref 0.6–1)
BASOPHILS # BLD AUTO: 0.1 10E9/L (ref 0–0.2)
BASOPHILS NFR BLD AUTO: 1 %
BILIRUB DIRECT SERPL-MCNC: 0.1 MG/DL (ref 0–0.2)
BILIRUB SERPL-MCNC: 0.4 MG/DL (ref 0.2–1.3)
BILIRUB UR QL STRIP: NEGATIVE
BUN SERPL-MCNC: 69 MG/DL (ref 7–30)
BUN SERPL-MCNC: 74 MG/DL (ref 7–30)
BUN SERPL-MCNC: 74 MG/DL (ref 7–30)
BUN SERPL-MCNC: 76 MG/DL (ref 7–30)
BUN SERPL-MCNC: 79 MG/DL (ref 7–30)
BUN SERPL-MCNC: 85 MG/DL (ref 7–30)
C-ANCA TITR SER IF: NORMAL {TITER}
C3 SERPL-MCNC: 126 MG/DL (ref 81–157)
C4 SERPL-MCNC: 37 MG/DL (ref 13–39)
CALCIUM SERPL-MCNC: 8.7 MG/DL (ref 8.5–10.1)
CALCIUM SERPL-MCNC: 8.8 MG/DL (ref 8.5–10.1)
CALCIUM SERPL-MCNC: 8.9 MG/DL (ref 8.5–10.1)
CALCIUM SERPL-MCNC: 9 MG/DL (ref 8.5–10.1)
CALCIUM SERPL-MCNC: 9.1 MG/DL (ref 8.5–10.1)
CALCIUM SERPL-MCNC: 9.3 MG/DL (ref 8.5–10.1)
CCP AB SER IA-ACNC: 1 U/ML
CHLORIDE SERPL-SCNC: 113 MMOL/L (ref 94–109)
CHLORIDE SERPL-SCNC: 115 MMOL/L (ref 94–109)
CHLORIDE SERPL-SCNC: 116 MMOL/L (ref 94–109)
CHLORIDE SERPL-SCNC: 116 MMOL/L (ref 94–109)
CHLORIDE SERPL-SCNC: 119 MMOL/L (ref 94–109)
CHLORIDE SERPL-SCNC: 120 MMOL/L (ref 94–109)
CO2 SERPL-SCNC: 13 MMOL/L (ref 20–32)
CO2 SERPL-SCNC: 14 MMOL/L (ref 20–32)
CO2 SERPL-SCNC: 15 MMOL/L (ref 20–32)
COLOR UR AUTO: YELLOW
COPATH REPORT: NORMAL
CREAT SERPL-MCNC: 2.03 MG/DL (ref 0.52–1.04)
CREAT SERPL-MCNC: 2.09 MG/DL (ref 0.52–1.04)
CREAT SERPL-MCNC: 2.13 MG/DL (ref 0.52–1.04)
CREAT SERPL-MCNC: 2.19 MG/DL (ref 0.52–1.04)
CREAT SERPL-MCNC: 2.2 MG/DL (ref 0.52–1.04)
CREAT SERPL-MCNC: 2.73 MG/DL (ref 0.52–1.04)
CREAT UR-MCNC: 88 MG/DL
CRP SERPL-MCNC: 45 MG/L (ref 0–8)
CRYOGLOB SER QL: ABNORMAL %
CRYOGLOB SER QL: ABNORMAL %
DIFFERENTIAL METHOD BLD: ABNORMAL
DSDNA AB SER-ACNC: <1 IU/ML
ENA RNP IGG SER IA-ACNC: <0.2 AI (ref 0–0.9)
ENA SM IGG SER-ACNC: <0.2 AI (ref 0–0.9)
ENA SS-A IGG SER IA-ACNC: <0.2 AI (ref 0–0.9)
ENA SS-B IGG SER IA-ACNC: <0.2 AI (ref 0–0.9)
EOSINOPHIL # BLD AUTO: 0.6 10E9/L (ref 0–0.7)
EOSINOPHIL NFR BLD AUTO: 8.6 %
ERYTHROCYTE [DISTWIDTH] IN BLOOD BY AUTOMATED COUNT: 15.9 % (ref 10–15)
ERYTHROCYTE [DISTWIDTH] IN BLOOD BY AUTOMATED COUNT: 16.2 % (ref 10–15)
ERYTHROCYTE [DISTWIDTH] IN BLOOD BY AUTOMATED COUNT: 17.2 % (ref 10–15)
ERYTHROCYTE [SEDIMENTATION RATE] IN BLOOD BY WESTERGREN METHOD: 34 MM/H (ref 0–30)
G6PD RBC-CCNC: 15.5 U/G HB (ref 9.9–16.6)
GAMMA GLOB MFR UR ELPH: 5.7 %
GAMMA GLOB SERPL ELPH-MCNC: 0.8 G/DL (ref 0.7–1.6)
GFR SERPL CREATININE-BSD FRML MDRD: 16 ML/MIN/{1.73_M2}
GFR SERPL CREATININE-BSD FRML MDRD: 21 ML/MIN/{1.73_M2}
GFR SERPL CREATININE-BSD FRML MDRD: 22 ML/MIN/{1.73_M2}
GFR SERPL CREATININE-BSD FRML MDRD: 23 ML/MIN/{1.73_M2}
GLUCOSE SERPL-MCNC: 100 MG/DL (ref 70–99)
GLUCOSE SERPL-MCNC: 120 MG/DL (ref 70–99)
GLUCOSE SERPL-MCNC: 87 MG/DL (ref 70–99)
GLUCOSE SERPL-MCNC: 92 MG/DL (ref 70–99)
GLUCOSE SERPL-MCNC: 96 MG/DL (ref 70–99)
GLUCOSE SERPL-MCNC: 97 MG/DL (ref 70–99)
GLUCOSE UR STRIP-MCNC: NEGATIVE MG/DL
HBV CORE AB SERPL QL IA: NONREACTIVE
HBV SURFACE AB SERPL IA-ACNC: 0.7 M[IU]/ML
HBV SURFACE AG SERPL QL IA: NONREACTIVE
HCT VFR BLD AUTO: 27.4 % (ref 35–47)
HCT VFR BLD AUTO: 29.7 % (ref 35–47)
HCT VFR BLD AUTO: 30.4 % (ref 35–47)
HCV AB SERPL QL IA: NONREACTIVE
HGB BLD-MCNC: 8.4 G/DL (ref 11.7–15.7)
HGB BLD-MCNC: 9.1 G/DL (ref 11.7–15.7)
HGB BLD-MCNC: 9.2 G/DL (ref 11.7–15.7)
HGB UR QL STRIP: ABNORMAL
HIV 1+2 AB+HIV1 P24 AG SERPL QL IA: NONREACTIVE
IGA SERPL-MCNC: 346 MG/DL (ref 84–499)
IGG SERPL-MCNC: 865 MG/DL (ref 610–1616)
IGM SERPL-MCNC: 83 MG/DL (ref 35–242)
IMM GRANULOCYTES # BLD: 0.1 10E9/L (ref 0–0.4)
IMM GRANULOCYTES NFR BLD: 1 %
INR PPP: 1.1 (ref 0.86–1.14)
INR PPP: 1.15 (ref 0.86–1.14)
KAPPA LC UR-MCNC: 10.82 MG/DL (ref 0.33–1.94)
KAPPA LC/LAMBDA SER: 1.8 {RATIO} (ref 0.26–1.65)
KETONES UR STRIP-MCNC: NEGATIVE MG/DL
LAMBDA LC SERPL-MCNC: 6.01 MG/DL (ref 0.57–2.63)
LEUKOCYTE ESTERASE UR QL STRIP: ABNORMAL
LYMPHOCYTES # BLD AUTO: 2.1 10E9/L (ref 0.8–5.3)
LYMPHOCYTES NFR BLD AUTO: 29.7 %
M PROTEIN MFR UR ELPH: 0 %
M PROTEIN SERPL ELPH-MCNC: 0 G/DL
MCH RBC QN AUTO: 26.7 PG (ref 26.5–33)
MCH RBC QN AUTO: 26.8 PG (ref 26.5–33)
MCH RBC QN AUTO: 27.8 PG (ref 26.5–33)
MCHC RBC AUTO-ENTMCNC: 29.9 G/DL (ref 31.5–36.5)
MCHC RBC AUTO-ENTMCNC: 30.7 G/DL (ref 31.5–36.5)
MCHC RBC AUTO-ENTMCNC: 31 G/DL (ref 31.5–36.5)
MCV RBC AUTO: 88 FL (ref 78–100)
MCV RBC AUTO: 89 FL (ref 78–100)
MCV RBC AUTO: 90 FL (ref 78–100)
MICROALBUMIN UR-MCNC: 2470 MG/L
MICROALBUMIN/CREAT UR: 2816.42 MG/G CR (ref 0–25)
MONOCYTES # BLD AUTO: 0.6 10E9/L (ref 0–1.3)
MONOCYTES NFR BLD AUTO: 9.2 %
MYELOPEROXIDASE AB SER-ACNC: <0.2 AI (ref 0–0.9)
NEUTROPHILS # BLD AUTO: 3.5 10E9/L (ref 1.6–8.3)
NEUTROPHILS NFR BLD AUTO: 50.5 %
NITRATE UR QL: NEGATIVE
NRBC # BLD AUTO: 0 10*3/UL
NRBC BLD AUTO-RTO: 0 /100
PH UR STRIP: 5.5 PH (ref 5–7)
PHOSPHATE SERPL-MCNC: 4.3 MG/DL (ref 2.5–4.5)
PHOSPHATE SERPL-MCNC: 4.5 MG/DL (ref 2.5–4.5)
PHOSPHATE SERPL-MCNC: 4.6 MG/DL (ref 2.5–4.5)
PHOSPHATE SERPL-MCNC: 4.9 MG/DL (ref 2.5–4.5)
PHOSPHATE SERPL-MCNC: 5.5 MG/DL (ref 2.5–4.5)
PLATELET # BLD AUTO: 281 10E9/L (ref 150–450)
PLATELET # BLD AUTO: 292 10E9/L (ref 150–450)
PLATELET # BLD AUTO: 525 10E9/L (ref 150–450)
POTASSIUM SERPL-SCNC: 4.3 MMOL/L (ref 3.4–5.3)
POTASSIUM SERPL-SCNC: 4.6 MMOL/L (ref 3.4–5.3)
POTASSIUM SERPL-SCNC: 4.8 MMOL/L (ref 3.4–5.3)
POTASSIUM SERPL-SCNC: 4.9 MMOL/L (ref 3.4–5.3)
POTASSIUM SERPL-SCNC: 5.3 MMOL/L (ref 3.4–5.3)
POTASSIUM SERPL-SCNC: 5.3 MMOL/L (ref 3.4–5.3)
PROT ELPH PNL UR ELPH: NORMAL
PROT PATTERN SERPL ELPH-IMP: ABNORMAL
PROT PATTERN SERPL IFE-IMP: NORMAL
PROT PATTERN UR ELPH-IMP: ABNORMAL
PROT SERPL-MCNC: 5.8 G/DL (ref 6.8–8.8)
PROT UR-MCNC: 2.81 G/L
PROT/CREAT 24H UR: 3.2 G/G CR (ref 0–0.2)
PROTEIN TOTAL TIMED URINE MG/SPEC LHE: 1447 MG/24HR (ref 0–150)
PROTEIN, RANDOM URINE - HISTORICAL: 804 MG/DL
PROTEINASE3 IGG SER-ACNC: <0.2 AI (ref 0–0.9)
RBC # BLD AUTO: 3.13 10E12/L (ref 3.8–5.2)
RBC # BLD AUTO: 3.31 10E12/L (ref 3.8–5.2)
RBC # BLD AUTO: 3.41 10E12/L (ref 3.8–5.2)
RBC #/AREA URNS AUTO: 13 /HPF (ref 0–2)
RESULT: NORMAL
RHEUMATOID FACT SER NEPH-ACNC: <7 IU/ML (ref 0–20)
SEND OUTS MISC TEST CODE: NORMAL
SEND OUTS MISC TEST SPECIMEN: NORMAL
SODIUM SERPL-SCNC: 138 MMOL/L (ref 133–144)
SODIUM SERPL-SCNC: 140 MMOL/L (ref 133–144)
SODIUM SERPL-SCNC: 142 MMOL/L (ref 133–144)
SOURCE: ABNORMAL
SP GR UR STRIP: 1.01 (ref 1–1.03)
SPECIMEN VOL UR: 180 ML
SQUAMOUS #/AREA URNS AUTO: <1 /HPF (ref 0–1)
TEST NAME: NORMAL
TRANS CELLS #/AREA URNS HPF: <1 /HPF (ref 0–1)
UROBILINOGEN UR STRIP-MCNC: NORMAL MG/DL (ref 0–2)
WBC # BLD AUTO: 13.3 10E9/L (ref 4–11)
WBC # BLD AUTO: 7 10E9/L (ref 4–11)
WBC # BLD AUTO: 7.8 10E9/L (ref 4–11)
WBC #/AREA URNS AUTO: 7 /HPF (ref 0–5)

## 2021-01-01 PROCEDURE — 86334 IMMUNOFIX E-PHORESIS SERUM: CPT | Mod: 26 | Performed by: PATHOLOGY

## 2021-01-01 PROCEDURE — 250N000013 HC RX MED GY IP 250 OP 250 PS 637: Performed by: STUDENT IN AN ORGANIZED HEALTH CARE EDUCATION/TRAINING PROGRAM

## 2021-01-01 PROCEDURE — 86334 IMMUNOFIX E-PHORESIS SERUM: CPT | Mod: TC | Performed by: STUDENT IN AN ORGANIZED HEALTH CARE EDUCATION/TRAINING PROGRAM

## 2021-01-01 PROCEDURE — 82784 ASSAY IGA/IGD/IGG/IGM EACH: CPT | Performed by: STUDENT IN AN ORGANIZED HEALTH CARE EDUCATION/TRAINING PROGRAM

## 2021-01-01 PROCEDURE — 99233 SBSQ HOSP IP/OBS HIGH 50: CPT | Mod: GC | Performed by: STUDENT IN AN ORGANIZED HEALTH CARE EDUCATION/TRAINING PROGRAM

## 2021-01-01 PROCEDURE — 83876 ASSAY MYELOPEROXIDASE: CPT | Performed by: INTERNAL MEDICINE

## 2021-01-01 PROCEDURE — 86039 ANTINUCLEAR ANTIBODIES (ANA): CPT | Performed by: INTERNAL MEDICINE

## 2021-01-01 PROCEDURE — 250N000013 HC RX MED GY IP 250 OP 250 PS 637: Performed by: INTERNAL MEDICINE

## 2021-01-01 PROCEDURE — 86706 HEP B SURFACE ANTIBODY: CPT | Performed by: INTERNAL MEDICINE

## 2021-01-01 PROCEDURE — 86235 NUCLEAR ANTIGEN ANTIBODY: CPT | Performed by: STUDENT IN AN ORGANIZED HEALTH CARE EDUCATION/TRAINING PROGRAM

## 2021-01-01 PROCEDURE — 99221 1ST HOSP IP/OBS SF/LOW 40: CPT | Mod: 25 | Performed by: DERMATOLOGY

## 2021-01-01 PROCEDURE — 250N000011 HC RX IP 250 OP 636: Performed by: STUDENT IN AN ORGANIZED HEALTH CARE EDUCATION/TRAINING PROGRAM

## 2021-01-01 PROCEDURE — 99214 OFFICE O/P EST MOD 30 MIN: CPT | Mod: TEL

## 2021-01-01 PROCEDURE — 86140 C-REACTIVE PROTEIN: CPT | Performed by: INTERNAL MEDICINE

## 2021-01-01 PROCEDURE — 88346 IMFLUOR 1ST 1ANTB STAIN PX: CPT | Mod: TC | Performed by: DERMATOLOGY

## 2021-01-01 PROCEDURE — 88305 TISSUE EXAM BY PATHOLOGIST: CPT | Mod: TC | Performed by: DERMATOLOGY

## 2021-01-01 PROCEDURE — 84999 UNLISTED CHEMISTRY PROCEDURE: CPT | Performed by: INTERNAL MEDICINE

## 2021-01-01 PROCEDURE — 99223 1ST HOSP IP/OBS HIGH 75: CPT | Mod: AI | Performed by: HOSPITALIST

## 2021-01-01 PROCEDURE — 80048 BASIC METABOLIC PNL TOTAL CA: CPT | Performed by: INTERNAL MEDICINE

## 2021-01-01 PROCEDURE — 99232 SBSQ HOSP IP/OBS MODERATE 35: CPT | Mod: GC | Performed by: DERMATOLOGY

## 2021-01-01 PROCEDURE — 87340 HEPATITIS B SURFACE AG IA: CPT | Performed by: INTERNAL MEDICINE

## 2021-01-01 PROCEDURE — 84156 ASSAY OF PROTEIN URINE: CPT | Performed by: INTERNAL MEDICINE

## 2021-01-01 PROCEDURE — 99232 SBSQ HOSP IP/OBS MODERATE 35: CPT | Mod: GC | Performed by: PEDIATRICS

## 2021-01-01 PROCEDURE — 85610 PROTHROMBIN TIME: CPT | Performed by: INTERNAL MEDICINE

## 2021-01-01 PROCEDURE — 99207 PR NO CHARGE LOS: CPT | Performed by: INTERNAL MEDICINE

## 2021-01-01 PROCEDURE — 82955 ASSAY OF G6PD ENZYME: CPT | Performed by: STUDENT IN AN ORGANIZED HEALTH CARE EDUCATION/TRAINING PROGRAM

## 2021-01-01 PROCEDURE — 86255 FLUORESCENT ANTIBODY SCREEN: CPT | Performed by: INTERNAL MEDICINE

## 2021-01-01 PROCEDURE — 83516 IMMUNOASSAY NONANTIBODY: CPT | Performed by: INTERNAL MEDICINE

## 2021-01-01 PROCEDURE — 120N000002 HC R&B MED SURG/OB UMMC

## 2021-01-01 PROCEDURE — 86160 COMPLEMENT ANTIGEN: CPT | Performed by: INTERNAL MEDICINE

## 2021-01-01 PROCEDURE — 36415 COLL VENOUS BLD VENIPUNCTURE: CPT | Performed by: INTERNAL MEDICINE

## 2021-01-01 PROCEDURE — 86704 HEP B CORE ANTIBODY TOTAL: CPT | Performed by: INTERNAL MEDICINE

## 2021-01-01 PROCEDURE — 86335 IMMUNFIX E-PHORSIS/URINE/CSF: CPT | Mod: 26 | Performed by: PATHOLOGY

## 2021-01-01 PROCEDURE — 11104 PUNCH BX SKIN SINGLE LESION: CPT | Mod: GC | Performed by: DERMATOLOGY

## 2021-01-01 PROCEDURE — 88350 IMFLUOR EA ADDL 1ANTB STN PX: CPT | Mod: 26 | Performed by: PATHOLOGY

## 2021-01-01 PROCEDURE — 99233 SBSQ HOSP IP/OBS HIGH 50: CPT | Mod: GC | Performed by: INTERNAL MEDICINE

## 2021-01-01 PROCEDURE — 0HBJXZX EXCISION OF LEFT UPPER LEG SKIN, EXTERNAL APPROACH, DIAGNOSTIC: ICD-10-PCS | Performed by: DERMATOLOGY

## 2021-01-01 PROCEDURE — 86335 IMMUNFIX E-PHORSIS/URINE/CSF: CPT | Mod: TC | Performed by: INTERNAL MEDICINE

## 2021-01-01 PROCEDURE — 99223 1ST HOSP IP/OBS HIGH 75: CPT | Mod: GC | Performed by: INTERNAL MEDICINE

## 2021-01-01 PROCEDURE — 85652 RBC SED RATE AUTOMATED: CPT | Performed by: INTERNAL MEDICINE

## 2021-01-01 PROCEDURE — 84165 PROTEIN E-PHORESIS SERUM: CPT | Mod: TC | Performed by: INTERNAL MEDICINE

## 2021-01-01 PROCEDURE — 99207 PR CDG-MDM COMPONENT: MEETS LOW - DOWN CODED: CPT | Performed by: INTERNAL MEDICINE

## 2021-01-01 PROCEDURE — 83883 ASSAY NEPHELOMETRY NOT SPEC: CPT | Performed by: INTERNAL MEDICINE

## 2021-01-01 PROCEDURE — 85027 COMPLETE CBC AUTOMATED: CPT | Performed by: STUDENT IN AN ORGANIZED HEALTH CARE EDUCATION/TRAINING PROGRAM

## 2021-01-01 PROCEDURE — 76770 US EXAM ABDO BACK WALL COMP: CPT

## 2021-01-01 PROCEDURE — 80069 RENAL FUNCTION PANEL: CPT | Performed by: PATHOLOGY

## 2021-01-01 PROCEDURE — 99239 HOSP IP/OBS DSCHRG MGMT >30: CPT | Mod: GC | Performed by: INTERNAL MEDICINE

## 2021-01-01 PROCEDURE — 76770 US EXAM ABDO BACK WALL COMP: CPT | Mod: 26 | Performed by: RADIOLOGY

## 2021-01-01 PROCEDURE — 86803 HEPATITIS C AB TEST: CPT | Performed by: INTERNAL MEDICINE

## 2021-01-01 PROCEDURE — 82043 UR ALBUMIN QUANTITATIVE: CPT | Performed by: INTERNAL MEDICINE

## 2021-01-01 PROCEDURE — 0HBHXZX EXCISION OF RIGHT UPPER LEG SKIN, EXTERNAL APPROACH, DIAGNOSTIC: ICD-10-PCS | Performed by: DERMATOLOGY

## 2021-01-01 PROCEDURE — 80069 RENAL FUNCTION PANEL: CPT | Performed by: STUDENT IN AN ORGANIZED HEALTH CARE EDUCATION/TRAINING PROGRAM

## 2021-01-01 PROCEDURE — 99232 SBSQ HOSP IP/OBS MODERATE 35: CPT | Mod: GC | Performed by: INTERNAL MEDICINE

## 2021-01-01 PROCEDURE — 999N000128 HC STATISTIC PERIPHERAL IV START W/O US GUIDANCE

## 2021-01-01 PROCEDURE — 250N000013 HC RX MED GY IP 250 OP 250 PS 637: Performed by: HOSPITALIST

## 2021-01-01 PROCEDURE — 999N000127 HC STATISTIC PERIPHERAL IV START W US GUIDANCE

## 2021-01-01 PROCEDURE — 36415 COLL VENOUS BLD VENIPUNCTURE: CPT | Performed by: STUDENT IN AN ORGANIZED HEALTH CARE EDUCATION/TRAINING PROGRAM

## 2021-01-01 PROCEDURE — 88305 TISSUE EXAM BY PATHOLOGIST: CPT | Mod: 26 | Performed by: PATHOLOGY

## 2021-01-01 PROCEDURE — 86225 DNA ANTIBODY NATIVE: CPT | Performed by: STUDENT IN AN ORGANIZED HEALTH CARE EDUCATION/TRAINING PROGRAM

## 2021-01-01 PROCEDURE — 86431 RHEUMATOID FACTOR QUANT: CPT | Performed by: STUDENT IN AN ORGANIZED HEALTH CARE EDUCATION/TRAINING PROGRAM

## 2021-01-01 PROCEDURE — 86038 ANTINUCLEAR ANTIBODIES: CPT | Performed by: INTERNAL MEDICINE

## 2021-01-01 PROCEDURE — 82595 ASSAY OF CRYOGLOBULIN: CPT | Performed by: INTERNAL MEDICINE

## 2021-01-01 PROCEDURE — 258N000003 HC RX IP 258 OP 636: Performed by: STUDENT IN AN ORGANIZED HEALTH CARE EDUCATION/TRAINING PROGRAM

## 2021-01-01 PROCEDURE — 86200 CCP ANTIBODY: CPT | Performed by: STUDENT IN AN ORGANIZED HEALTH CARE EDUCATION/TRAINING PROGRAM

## 2021-01-01 PROCEDURE — 999N001036 HC STATISTIC TOTAL PROTEIN: Performed by: INTERNAL MEDICINE

## 2021-01-01 PROCEDURE — 84165 PROTEIN E-PHORESIS SERUM: CPT | Mod: 26 | Performed by: PATHOLOGY

## 2021-01-01 PROCEDURE — 88346 IMFLUOR 1ST 1ANTB STAIN PX: CPT | Mod: 26 | Performed by: PATHOLOGY

## 2021-01-01 PROCEDURE — 85027 COMPLETE CBC AUTOMATED: CPT | Performed by: PATHOLOGY

## 2021-01-01 PROCEDURE — 86060 ANTISTREPTOLYSIN O TITER: CPT | Performed by: INTERNAL MEDICINE

## 2021-01-01 PROCEDURE — 87389 HIV-1 AG W/HIV-1&-2 AB AG IA: CPT | Performed by: INTERNAL MEDICINE

## 2021-01-01 PROCEDURE — 85025 COMPLETE CBC W/AUTO DIFF WBC: CPT | Performed by: INTERNAL MEDICINE

## 2021-01-01 PROCEDURE — 84166 PROTEIN E-PHORESIS/URINE/CSF: CPT | Mod: TC | Performed by: INTERNAL MEDICINE

## 2021-01-01 PROCEDURE — 81001 URINALYSIS AUTO W/SCOPE: CPT | Performed by: INTERNAL MEDICINE

## 2021-01-01 PROCEDURE — G0463 HOSPITAL OUTPT CLINIC VISIT: HCPCS | Performed by: INTERNAL MEDICINE

## 2021-01-01 PROCEDURE — 80069 RENAL FUNCTION PANEL: CPT | Performed by: INTERNAL MEDICINE

## 2021-01-01 PROCEDURE — 36415 COLL VENOUS BLD VENIPUNCTURE: CPT | Performed by: PATHOLOGY

## 2021-01-01 PROCEDURE — 80076 HEPATIC FUNCTION PANEL: CPT | Performed by: INTERNAL MEDICINE

## 2021-01-01 PROCEDURE — 88350 IMFLUOR EA ADDL 1ANTB STN PX: CPT | Mod: TC | Performed by: DERMATOLOGY

## 2021-01-01 RX ORDER — HYDROCHLOROTHIAZIDE 12.5 MG/1
12.5 TABLET ORAL DAILY
Status: DISCONTINUED | OUTPATIENT
Start: 2021-01-01 | End: 2021-01-01

## 2021-01-01 RX ORDER — TRIAMCINOLONE ACETONIDE 1 MG/G
CREAM TOPICAL 2 TIMES DAILY
Qty: 450 G | Refills: 0 | Status: SHIPPED | OUTPATIENT
Start: 2021-01-01

## 2021-01-01 RX ORDER — SENNOSIDES 8.6 MG
650 CAPSULE ORAL EVERY 6 HOURS PRN
Status: DISCONTINUED | OUTPATIENT
Start: 2021-01-01 | End: 2021-01-01

## 2021-01-01 RX ORDER — PREGABALIN 75 MG/1
75 CAPSULE ORAL DAILY
Start: 2021-01-01

## 2021-01-01 RX ORDER — PREGABALIN 75 MG/1
75 CAPSULE ORAL DAILY
Status: DISCONTINUED | OUTPATIENT
Start: 2021-01-01 | End: 2021-01-01 | Stop reason: HOSPADM

## 2021-01-01 RX ORDER — IBUPROFEN 200 MG
1 CAPSULE ORAL DAILY
COMMUNITY

## 2021-01-01 RX ORDER — GUAIFENESIN 600 MG/1
600 TABLET, EXTENDED RELEASE ORAL 2 TIMES DAILY
Status: DISCONTINUED | OUTPATIENT
Start: 2021-01-01 | End: 2021-01-01 | Stop reason: HOSPADM

## 2021-01-01 RX ORDER — FLUTICASONE PROPIONATE 50 MCG
2 SPRAY, SUSPENSION (ML) NASAL DAILY
Status: DISCONTINUED | OUTPATIENT
Start: 2021-01-01 | End: 2021-01-01 | Stop reason: HOSPADM

## 2021-01-01 RX ORDER — ASPIRIN 81 MG/1
81 TABLET ORAL DAILY
Status: CANCELLED | OUTPATIENT
Start: 2021-01-01

## 2021-01-01 RX ORDER — LIDOCAINE 40 MG/G
CREAM TOPICAL
Status: DISCONTINUED | OUTPATIENT
Start: 2021-01-01 | End: 2021-01-01 | Stop reason: HOSPADM

## 2021-01-01 RX ORDER — LISINOPRIL AND HYDROCHLOROTHIAZIDE 12.5; 2 MG/1; MG/1
1 TABLET ORAL DAILY
Status: ON HOLD | COMMUNITY
End: 2021-01-01

## 2021-01-01 RX ORDER — TRIAMCINOLONE ACETONIDE 1 MG/G
CREAM TOPICAL 2 TIMES DAILY
Status: DISCONTINUED | OUTPATIENT
Start: 2021-01-01 | End: 2021-01-01 | Stop reason: HOSPADM

## 2021-01-01 RX ORDER — SODIUM BICARBONATE 650 MG/1
1300 TABLET ORAL 2 TIMES DAILY
Status: DISCONTINUED | OUTPATIENT
Start: 2021-01-01 | End: 2021-01-01 | Stop reason: HOSPADM

## 2021-01-01 RX ORDER — SODIUM BICARBONATE 650 MG/1
1300 TABLET ORAL 2 TIMES DAILY
Qty: 120 TABLET | Refills: 0 | Status: SHIPPED | OUTPATIENT
Start: 2021-01-01 | End: 2021-01-01

## 2021-01-01 RX ORDER — ASPIRIN 81 MG/1
81 TABLET ORAL DAILY
Status: DISCONTINUED | OUTPATIENT
Start: 2021-01-01 | End: 2021-01-01 | Stop reason: HOSPADM

## 2021-01-01 RX ORDER — TRAZODONE HYDROCHLORIDE 50 MG/1
150 TABLET, FILM COATED ORAL AT BEDTIME
Status: CANCELLED | OUTPATIENT
Start: 2021-01-01

## 2021-01-01 RX ORDER — TRAZODONE HYDROCHLORIDE 50 MG/1
150 TABLET, FILM COATED ORAL
Status: DISCONTINUED | OUTPATIENT
Start: 2021-01-01 | End: 2021-01-01 | Stop reason: HOSPADM

## 2021-01-01 RX ORDER — GUAIFENESIN 600 MG/1
600 TABLET, EXTENDED RELEASE ORAL 2 TIMES DAILY PRN
Status: DISCONTINUED | OUTPATIENT
Start: 2021-01-01 | End: 2021-01-01 | Stop reason: HOSPADM

## 2021-01-01 RX ORDER — TRAZODONE HYDROCHLORIDE 150 MG/1
150 TABLET ORAL AT BEDTIME
COMMUNITY
Start: 2020-01-01

## 2021-01-01 RX ORDER — POLYETHYLENE GLYCOL 3350 17 G/17G
17 POWDER, FOR SOLUTION ORAL DAILY PRN
Status: DISCONTINUED | OUTPATIENT
Start: 2021-01-01 | End: 2021-01-01 | Stop reason: HOSPADM

## 2021-01-01 RX ORDER — VITAMIN B COMPLEX
50 TABLET ORAL DAILY
Status: DISCONTINUED | OUTPATIENT
Start: 2021-01-01 | End: 2021-01-01 | Stop reason: HOSPADM

## 2021-01-01 RX ORDER — SODIUM BICARBONATE 650 MG/1
650 TABLET ORAL 2 TIMES DAILY
Status: DISCONTINUED | OUTPATIENT
Start: 2021-01-01 | End: 2021-01-01

## 2021-01-01 RX ORDER — AMOXICILLIN 250 MG
1 CAPSULE ORAL 2 TIMES DAILY PRN
Status: DISCONTINUED | OUTPATIENT
Start: 2021-01-01 | End: 2021-01-01 | Stop reason: HOSPADM

## 2021-01-01 RX ORDER — ONDANSETRON 2 MG/ML
4 INJECTION INTRAMUSCULAR; INTRAVENOUS EVERY 6 HOURS PRN
Status: DISCONTINUED | OUTPATIENT
Start: 2021-01-01 | End: 2021-01-01 | Stop reason: HOSPADM

## 2021-01-01 RX ORDER — HEPARIN SODIUM 5000 [USP'U]/.5ML
5000 INJECTION, SOLUTION INTRAVENOUS; SUBCUTANEOUS EVERY 8 HOURS
Status: DISCONTINUED | OUTPATIENT
Start: 2021-01-01 | End: 2021-01-01 | Stop reason: HOSPADM

## 2021-01-01 RX ORDER — CALCIUM CARBONATE 500(1250)
1 TABLET ORAL DAILY
Status: DISCONTINUED | OUTPATIENT
Start: 2021-01-01 | End: 2021-01-01 | Stop reason: HOSPADM

## 2021-01-01 RX ORDER — ACETAMINOPHEN 325 MG/1
650 TABLET ORAL EVERY 4 HOURS PRN
Status: DISCONTINUED | OUTPATIENT
Start: 2021-01-01 | End: 2021-01-01 | Stop reason: HOSPADM

## 2021-01-01 RX ORDER — FEXOFENADINE HCL 180 MG/1
180 TABLET ORAL DAILY
Status: DISCONTINUED | OUTPATIENT
Start: 2021-01-01 | End: 2021-01-01 | Stop reason: HOSPADM

## 2021-01-01 RX ORDER — FEXOFENADINE HCL 180 MG/1
180 TABLET ORAL DAILY
COMMUNITY

## 2021-01-01 RX ORDER — ONDANSETRON 4 MG/1
4 TABLET, ORALLY DISINTEGRATING ORAL EVERY 6 HOURS PRN
Status: DISCONTINUED | OUTPATIENT
Start: 2021-01-01 | End: 2021-01-01 | Stop reason: HOSPADM

## 2021-01-01 RX ORDER — OXYBUTYNIN CHLORIDE 5 MG/1
5 TABLET, EXTENDED RELEASE ORAL DAILY
COMMUNITY

## 2021-01-01 RX ORDER — LIDOCAINE HYDROCHLORIDE 10 MG/ML
30 INJECTION, SOLUTION INFILTRATION; PERINEURAL ONCE
Status: DISCONTINUED | OUTPATIENT
Start: 2021-01-01 | End: 2021-01-01 | Stop reason: CLARIF

## 2021-01-01 RX ORDER — AMOXICILLIN 250 MG
2 CAPSULE ORAL 2 TIMES DAILY PRN
Status: DISCONTINUED | OUTPATIENT
Start: 2021-01-01 | End: 2021-01-01 | Stop reason: HOSPADM

## 2021-01-01 RX ORDER — TRAZODONE HYDROCHLORIDE 50 MG/1
150 TABLET, FILM COATED ORAL AT BEDTIME
Status: DISCONTINUED | OUTPATIENT
Start: 2021-01-01 | End: 2021-01-01 | Stop reason: HOSPADM

## 2021-01-01 RX ORDER — SODIUM BICARBONATE 650 MG/1
1300 TABLET ORAL 3 TIMES DAILY
Qty: 240 TABLET | Refills: 3 | Status: SHIPPED | OUTPATIENT
Start: 2021-01-01

## 2021-01-01 RX ORDER — CHOLECALCIFEROL (VITAMIN D3) 50 MCG
1 TABLET ORAL DAILY
COMMUNITY

## 2021-01-01 RX ORDER — HEPARIN SODIUM 5000 [USP'U]/.5ML
5000 INJECTION, SOLUTION INTRAVENOUS; SUBCUTANEOUS EVERY 8 HOURS
Status: CANCELLED | OUTPATIENT
Start: 2021-01-01

## 2021-01-01 RX ORDER — SENNOSIDES 8.6 MG
650 CAPSULE ORAL EVERY 6 HOURS PRN
Status: CANCELLED | OUTPATIENT
Start: 2021-01-01

## 2021-01-01 RX ORDER — SENNOSIDES 8.6 MG
650 CAPSULE ORAL EVERY 6 HOURS PRN
COMMUNITY

## 2021-01-01 RX ORDER — FLUTICASONE PROPIONATE 50 MCG
2 SPRAY, SUSPENSION (ML) NASAL DAILY
COMMUNITY

## 2021-01-01 RX ORDER — PREGABALIN 75 MG/1
75 CAPSULE ORAL 2 TIMES DAILY
Status: ON HOLD | COMMUNITY
Start: 2020-01-01 | End: 2021-01-01

## 2021-01-01 RX ADMIN — TRIAMCINOLONE ACETONIDE: 1 CREAM TOPICAL at 08:23

## 2021-01-01 RX ADMIN — ACETAMINOPHEN 650 MG: 325 TABLET, FILM COATED ORAL at 04:26

## 2021-01-01 RX ADMIN — PREGABALIN 75 MG: 75 CAPSULE ORAL at 09:14

## 2021-01-01 RX ADMIN — FEXOFENADINE HYDROCHLORIDE 180 MG: 180 TABLET, FILM COATED ORAL at 08:18

## 2021-01-01 RX ADMIN — OMEPRAZOLE 20 MG: 20 CAPSULE, DELAYED RELEASE ORAL at 08:18

## 2021-01-01 RX ADMIN — TRIAMCINOLONE ACETONIDE: 1 CREAM TOPICAL at 22:09

## 2021-01-01 RX ADMIN — HYDROCHLOROTHIAZIDE: 12.5 CAPSULE ORAL at 09:10

## 2021-01-01 RX ADMIN — Medication 1 MG: at 23:06

## 2021-01-01 RX ADMIN — TRAZODONE HYDROCHLORIDE 150 MG: 50 TABLET ORAL at 22:09

## 2021-01-01 RX ADMIN — FEXOFENADINE HYDROCHLORIDE 180 MG: 180 TABLET, FILM COATED ORAL at 08:04

## 2021-01-01 RX ADMIN — ASPIRIN 81 MG: 81 TABLET, COATED ORAL at 08:32

## 2021-01-01 RX ADMIN — TRAZODONE HYDROCHLORIDE 150 MG: 50 TABLET ORAL at 21:40

## 2021-01-01 RX ADMIN — HEPARIN SODIUM 5000 UNITS: 5000 INJECTION, SOLUTION INTRAVENOUS; SUBCUTANEOUS at 06:08

## 2021-01-01 RX ADMIN — OMEPRAZOLE 20 MG: 20 CAPSULE, DELAYED RELEASE ORAL at 08:03

## 2021-01-01 RX ADMIN — HEPARIN SODIUM 5000 UNITS: 5000 INJECTION, SOLUTION INTRAVENOUS; SUBCUTANEOUS at 11:43

## 2021-01-01 RX ADMIN — GUAIFENESIN 600 MG: 600 TABLET ORAL at 08:03

## 2021-01-01 RX ADMIN — OMEPRAZOLE 20 MG: 20 CAPSULE, DELAYED RELEASE ORAL at 08:53

## 2021-01-01 RX ADMIN — TRAZODONE HYDROCHLORIDE 150 MG: 50 TABLET ORAL at 21:06

## 2021-01-01 RX ADMIN — CALCIUM 500 MG: 500 TABLET ORAL at 08:53

## 2021-01-01 RX ADMIN — Medication 1 MG: at 22:09

## 2021-01-01 RX ADMIN — OMEPRAZOLE 20 MG: 20 CAPSULE, DELAYED RELEASE ORAL at 08:34

## 2021-01-01 RX ADMIN — SALINE NASAL SPRAY 1 SPRAY: 1.5 SOLUTION NASAL at 08:02

## 2021-01-01 RX ADMIN — FEXOFENADINE HYDROCHLORIDE 180 MG: 180 TABLET, FILM COATED ORAL at 08:53

## 2021-01-01 RX ADMIN — Medication 50 MCG: at 08:52

## 2021-01-01 RX ADMIN — FLUOXETINE 20 MG: 20 CAPSULE ORAL at 08:18

## 2021-01-01 RX ADMIN — GUAIFENESIN 600 MG: 600 TABLET ORAL at 08:34

## 2021-01-01 RX ADMIN — Medication 1 MG: at 00:01

## 2021-01-01 RX ADMIN — CALCIUM 500 MG: 500 TABLET ORAL at 08:18

## 2021-01-01 RX ADMIN — GUAIFENESIN 600 MG: 600 TABLET ORAL at 08:18

## 2021-01-01 RX ADMIN — HYDROCHLOROTHIAZIDE 12.5 MG: 12.5 TABLET ORAL at 08:18

## 2021-01-01 RX ADMIN — FLUTICASONE PROPIONATE 2 SPRAY: 50 SPRAY, METERED NASAL at 08:53

## 2021-01-01 RX ADMIN — FLUOXETINE 20 MG: 20 CAPSULE ORAL at 08:04

## 2021-01-01 RX ADMIN — GUAIFENESIN 600 MG: 600 TABLET ORAL at 19:02

## 2021-01-01 RX ADMIN — SALINE NASAL SPRAY 1 SPRAY: 1.5 SOLUTION NASAL at 00:01

## 2021-01-01 RX ADMIN — ACETAMINOPHEN 650 MG: 325 TABLET, FILM COATED ORAL at 08:33

## 2021-01-01 RX ADMIN — TRAZODONE HYDROCHLORIDE 150 MG: 50 TABLET ORAL at 21:27

## 2021-01-01 RX ADMIN — GUAIFENESIN 600 MG: 600 TABLET, EXTENDED RELEASE ORAL at 02:47

## 2021-01-01 RX ADMIN — ASPIRIN 81 MG: 81 TABLET, COATED ORAL at 09:09

## 2021-01-01 RX ADMIN — PREGABALIN 75 MG: 75 CAPSULE ORAL at 08:23

## 2021-01-01 RX ADMIN — ACETAMINOPHEN 650 MG: 325 TABLET, FILM COATED ORAL at 00:27

## 2021-01-01 RX ADMIN — FEXOFENADINE HYDROCHLORIDE 180 MG: 180 TABLET, FILM COATED ORAL at 09:10

## 2021-01-01 RX ADMIN — ACETAMINOPHEN 650 MG: 325 TABLET, FILM COATED ORAL at 21:06

## 2021-01-01 RX ADMIN — FLUOXETINE 20 MG: 20 CAPSULE ORAL at 08:34

## 2021-01-01 RX ADMIN — TRIAMCINOLONE ACETONIDE: 1 CREAM TOPICAL at 08:39

## 2021-01-01 RX ADMIN — PREGABALIN 75 MG: 75 CAPSULE ORAL at 08:57

## 2021-01-01 RX ADMIN — GUAIFENESIN 600 MG: 600 TABLET ORAL at 20:00

## 2021-01-01 RX ADMIN — GUAIFENESIN 600 MG: 600 TABLET, EXTENDED RELEASE ORAL at 17:25

## 2021-01-01 RX ADMIN — GUAIFENESIN 600 MG: 600 TABLET, EXTENDED RELEASE ORAL at 03:41

## 2021-01-01 RX ADMIN — FLUOXETINE 20 MG: 20 CAPSULE ORAL at 09:09

## 2021-01-01 RX ADMIN — Medication 50 MCG: at 08:34

## 2021-01-01 RX ADMIN — HYDROCHLOROTHIAZIDE: 12.5 CAPSULE ORAL at 08:33

## 2021-01-01 RX ADMIN — FLUTICASONE PROPIONATE 2 SPRAY: 50 SPRAY, METERED NASAL at 08:32

## 2021-01-01 RX ADMIN — ACETAMINOPHEN 650 MG: 325 TABLET, FILM COATED ORAL at 23:27

## 2021-01-01 RX ADMIN — CALCIUM 500 MG: 500 TABLET ORAL at 10:33

## 2021-01-01 RX ADMIN — GUAIFENESIN 600 MG: 600 TABLET, EXTENDED RELEASE ORAL at 00:01

## 2021-01-01 RX ADMIN — SALINE NASAL SPRAY 1 SPRAY: 1.5 SOLUTION NASAL at 05:36

## 2021-01-01 RX ADMIN — SODIUM BICARBONATE 650 MG TABLET 1300 MG: at 08:33

## 2021-01-01 RX ADMIN — TRIAMCINOLONE ACETONIDE: 1 CREAM TOPICAL at 19:02

## 2021-01-01 RX ADMIN — SODIUM BICARBONATE 650 MG TABLET 1300 MG: at 10:28

## 2021-01-01 RX ADMIN — GUAIFENESIN 600 MG: 600 TABLET ORAL at 23:06

## 2021-01-01 RX ADMIN — GUAIFENESIN 600 MG: 600 TABLET ORAL at 20:09

## 2021-01-01 RX ADMIN — HEPARIN SODIUM 5000 UNITS: 5000 INJECTION, SOLUTION INTRAVENOUS; SUBCUTANEOUS at 20:00

## 2021-01-01 RX ADMIN — FLUTICASONE PROPIONATE 2 SPRAY: 50 SPRAY, METERED NASAL at 10:33

## 2021-01-01 RX ADMIN — GUAIFENESIN 600 MG: 600 TABLET, EXTENDED RELEASE ORAL at 02:03

## 2021-01-01 RX ADMIN — GUAIFENESIN 600 MG: 600 TABLET ORAL at 08:52

## 2021-01-01 RX ADMIN — SODIUM CHLORIDE, POTASSIUM CHLORIDE, SODIUM LACTATE AND CALCIUM CHLORIDE 500 ML: 600; 310; 30; 20 INJECTION, SOLUTION INTRAVENOUS at 05:36

## 2021-01-01 RX ADMIN — PREGABALIN 75 MG: 75 CAPSULE ORAL at 08:04

## 2021-01-01 RX ADMIN — FLUOXETINE 20 MG: 20 CAPSULE ORAL at 08:53

## 2021-01-01 RX ADMIN — HYDROCHLOROTHIAZIDE 12.5 MG: 12.5 TABLET ORAL at 08:54

## 2021-01-01 RX ADMIN — FEXOFENADINE HYDROCHLORIDE 180 MG: 180 TABLET, FILM COATED ORAL at 08:34

## 2021-01-01 RX ADMIN — HYDROCHLOROTHIAZIDE 12.5 MG: 12.5 TABLET ORAL at 08:03

## 2021-01-01 RX ADMIN — GUAIFENESIN 600 MG: 600 TABLET ORAL at 09:10

## 2021-01-01 RX ADMIN — SALINE NASAL SPRAY 1 SPRAY: 1.5 SOLUTION NASAL at 05:02

## 2021-01-01 RX ADMIN — ACETAMINOPHEN 650 MG: 325 TABLET, FILM COATED ORAL at 02:27

## 2021-01-01 RX ADMIN — ACETAMINOPHEN 650 MG: 325 TABLET, FILM COATED ORAL at 18:45

## 2021-01-01 RX ADMIN — SALINE NASAL SPRAY 1 SPRAY: 1.5 SOLUTION NASAL at 23:23

## 2021-01-01 RX ADMIN — CALCIUM 500 MG: 500 TABLET ORAL at 08:34

## 2021-01-01 RX ADMIN — ACETAMINOPHEN 650 MG: 325 TABLET, FILM COATED ORAL at 15:46

## 2021-01-01 RX ADMIN — FLUTICASONE PROPIONATE 2 SPRAY: 50 SPRAY, METERED NASAL at 18:47

## 2021-01-01 RX ADMIN — Medication 50 MCG: at 08:04

## 2021-01-01 RX ADMIN — GUAIFENESIN 600 MG: 600 TABLET, EXTENDED RELEASE ORAL at 16:30

## 2021-01-01 RX ADMIN — ACETAMINOPHEN 650 MG: 325 TABLET, FILM COATED ORAL at 10:28

## 2021-01-01 RX ADMIN — TRAZODONE HYDROCHLORIDE 150 MG: 50 TABLET ORAL at 00:15

## 2021-01-01 RX ADMIN — Medication 50 MCG: at 08:18

## 2021-01-01 RX ADMIN — HEPARIN SODIUM 5000 UNITS: 5000 INJECTION, SOLUTION INTRAVENOUS; SUBCUTANEOUS at 12:41

## 2021-01-01 RX ADMIN — GUAIFENESIN 600 MG: 600 TABLET, EXTENDED RELEASE ORAL at 18:46

## 2021-01-01 RX ADMIN — SALINE NASAL SPRAY 1 SPRAY: 1.5 SOLUTION NASAL at 00:56

## 2021-01-01 RX ADMIN — PREGABALIN 75 MG: 75 CAPSULE ORAL at 08:34

## 2021-01-01 SDOH — HEALTH STABILITY: MENTAL HEALTH: HOW OFTEN DO YOU HAVE A DRINK CONTAINING ALCOHOL?: NOT ASKED

## 2021-01-01 SDOH — HEALTH STABILITY: MENTAL HEALTH: HOW MANY STANDARD DRINKS CONTAINING ALCOHOL DO YOU HAVE ON A TYPICAL DAY?: NOT ASKED

## 2021-01-01 SDOH — HEALTH STABILITY: MENTAL HEALTH: HOW OFTEN DO YOU HAVE 6 OR MORE DRINKS ON ONE OCCASION?: NOT ASKED

## 2021-01-01 ASSESSMENT — ACTIVITIES OF DAILY LIVING (ADL)
ADLS_ACUITY_SCORE: 14
ADLS_ACUITY_SCORE: 14
DOING_ERRANDS_INDEPENDENTLY_DIFFICULTY: NO
ADLS_ACUITY_SCORE: 14
WEAR_GLASSES_OR_BLIND: NO
ADLS_ACUITY_SCORE: 14
DIFFICULTY_COMMUNICATING: NO
ADLS_ACUITY_SCORE: 14
DIFFICULTY_EATING/SWALLOWING: NO
ADLS_ACUITY_SCORE: 14
ADLS_ACUITY_SCORE: 14
CONCENTRATING,_REMEMBERING_OR_MAKING_DECISIONS_DIFFICULTY: NO
DRESSING/BATHING_DIFFICULTY: NO
ADLS_ACUITY_SCORE: 14
TOILETING_ISSUES: NO
HEARING_DIFFICULTY_OR_DEAF: NO
ADLS_ACUITY_SCORE: 14
FALL_HISTORY_WITHIN_LAST_SIX_MONTHS: NO
ADLS_ACUITY_SCORE: 14
PATIENT_/_FAMILY_COMMUNICATION_STYLE: SPOKEN LANGUAGE (ENGLISH OR BILINGUAL)
ADLS_ACUITY_SCORE: 14
ADLS_ACUITY_SCORE: 14
WALKING_OR_CLIMBING_STAIRS_DIFFICULTY: NO

## 2021-01-01 ASSESSMENT — MIFFLIN-ST. JEOR
SCORE: 1201.64
SCORE: 1254.71
SCORE: 1255.61

## 2021-01-01 ASSESSMENT — PAIN SCALES - GENERAL: PAINLEVEL: NO PAIN (1)

## 2021-01-01 ASSESSMENT — PATIENT HEALTH QUESTIONNAIRE - PHQ9: SUM OF ALL RESPONSES TO PHQ QUESTIONS 1-9: 8

## 2021-01-17 PROBLEM — R21 RASH: Status: ACTIVE | Noted: 2021-01-01

## 2021-01-17 NOTE — TELEPHONE ENCOUNTER
[facility] [place]  Transfer Triage Note    Date of call: 01/17/21  Time of call: 5:46 PM    Is pandemic COVID-19 a concern? NO    Reason for transfer: Consultant management of rash   Diagnosis: Acute renal failure with petechial rash.    Outside Records: Not available  Additional records requested to be faxed to [phone].    Stability of Patient: Patient is vitally stable, with no critical labs, and will likely remain stable throughout the transfer process  ICU: No    Expected Time of Arrival for Transfer: 0-8 hours    Arrival Location:  [address] [address] Phone: [phone]    Recommendations for Management and Stabilization: Not needed    Additional Comments     81 yo F with HTN, COPD, stage III CKD, and a known ulcer of her distal toe with plans for outpatient amputation and currently on Cephalexin and Doxycyline for cellulitis who presented to OSH ED with worsening non-blanchable rash on her lower extremities, abdomen, and arm. No other systemic symptoms.     Vitals: /64, T 97.5, HR 81, RR 18, 96% on room air.     Labs notable for Cr 3.2 (baseline 1.4), K 5.4. CRP 4, ESR is pending. UA not obtained. COVID neg.     Concern for possible vasculitis/rheumatologic phenomenon given rash with kidney injury. Recommended inflammatory markers and UA to assess for hematuria. Will plan to transfer her to facility where we can obtain Derm/Rheum consultations.      Kayla Gee MD

## 2021-01-18 NOTE — CONSULTS
Nephrology Initial Consult  January 18, 2021      Sara Ashton MRN:[id number] YOB: 1940  Date of Admission:1/17/2021  Primary care provider: Agustín rCaft  Requesting physician: Flip Coles DO    ASSESSMENT AND RECOMMENDATIONS:   SHANT on CKD  Hematuria  Proteinuria, nephrotic range  Purpuric rash  Baseline Cr 1.2-1.3 until late 2019 and 1.4-1.5 more recently in Nov. Noted to be 3.26 at office visit on 1/17 and pt also developed purpuric rash. Tx to [facility] for specialist consultation. Cr has improved since admission to 2.73. LFTs are normal. Patient does have borderline eosinophilia on CBC with diff. Imaging reveals normal sized kidneys without gross abnormality. Complement levels and serology for HCV/HBV/HIV are normal with further autoimmune serologies pending. Differential for her presentation includes drug reaction 2/2 recent antibiotic use and GN processes including IgA nephropathy/HSP although presence of proteinuria is confounded by her chronic NSAID use. Patient also has borderline hypercalcemia (Sharon Ca 10.2), normocytic anemia and should be evaluated for B cell dyscrasias. The fact that her Cr has improved since yesterday is reassuring and will defer initiation of immunosuppressive therapy for now while we await labs. A kidney biopsy is indicated to definitively diagnose the etiology of renal involvement and we did discuss this with patient who agreed.   - Renal biopsy planned for Wed afternoon. Pt had been taking NSAIDs PTA and also received ASA 81 mg after admission. She notes she had not been taking ASA for a month prior though so believe it is safe to proceed after holding for about 48 hrs  - Held PTA ASA and ACEi  - Hold PTA meloxicam  - Continue PTA hydrochlorothiazide  - Await JEFE, ANCA MPO/PR3, SHABNAM panel, SPEP/UPEP/FLC, cryoglobulin level  - Appreciate dermatology and rheumatology involvement    Recommendations were communicated to primary team verbally    Seen and discussed with  Dr. Veronika Shin MD   [phone]    REASON FOR CONSULT: SHANT, proteinuria, hematuria    HISTORY OF PRESENT ILLNESS:  Admitting provider and nursing notes reviewed  Sara Ashton is a 80 year old with PMH COPD, pulmonary fibrosis, CKD3, HTN, HLD, who presented to OSH with diffuse skin rash. Developed L foot cellulitis which was treated with cephalexin and doxycycline from 1/1/2021. Finished course of antibiotics on 1/8. Notes the rash started after she started taking the antibiotics and spread from her legs to the rest of her body. Rash is non-tender, not pruritic. Notes she also developed bilateral hand swelling in the past 2-3 days. Also has nodules on her finger joints related to RA but these are chronic. Does endorse sinus congestion but denies SOB, mucosal ulcers, epistaxis, cough, chest pain, abdominal pain, dysuria, gross hematuria, diarrhea. Endorses daily use of meloxicam for her RA symptoms.    PAST MEDICAL HISTORY:  Past Medical History:   Diagnosis Date     CKD (chronic kidney disease) stage 3, GFR 30-59 ml/min      COPD (chronic obstructive pulmonary disease) (H)      ILD (interstitial lung disease) (H)      No past surgical history on file.     MEDICATIONS:  PTA Meds  Prior to Admission medications    Medication Sig Last Dose Taking? Auth Provider   acetaminophen (TYLENOL) 650 MG CR tablet Take 650 mg by mouth every 6 hours as needed  Yes Reported, Patient   calcium carbonate 500 mg, elemental, (OSCAL 500) 1250 (500 Ca) MG TABS tablet Take 1 tablet by mouth daily  Yes Unknown, Entered By History   fexofenadine (ALLEGRA) 180 MG tablet Take 180 mg by mouth daily  Yes Reported, Patient   FLUoxetine (PROZAC) 20 MG capsule Take 20 mg by mouth daily  Yes Reported, Patient   fluticasone (FLONASE) 50 MCG/ACT nasal spray Spray 2 sprays into both nostrils daily   Yes Unknown, Entered By History   hypromellose (ARTIFICIAL TEARS) 0.5 % SOLN ophthalmic solution 1 drop 4 times daily as needed for dry eyes   Yes Unknown, Entered By History   lisinopril-hydrochlorothiazide (ZESTORETIC) 20-12.5 MG tablet Take 1 tablet by mouth daily  Yes Unknown, Entered By History   NONFORMULARY Take 1 tablet by mouth daily Probiotic- unsure content.  Yes Unknown, Entered By History   omeprazole (PRILOSEC) 20 MG DR capsule Take 20 mg by mouth daily  Yes Unknown, Entered By History   oxybutynin ER (DITROPAN-XL) 5 MG 24 hr tablet Take 5 mg by mouth daily  Yes Unknown, Entered By History   pregabalin (LYRICA) 75 MG capsule Take 75 mg by mouth 2 times daily   Yes Reported, Patient   traZODone (DESYREL) 150 MG tablet Take 150 mg by mouth At Bedtime  Yes Reported, Patient   vitamin D3 (CHOLECALCIFEROL) 50 mcg (2000 units) tablet Take 1 tablet by mouth daily  Yes Unknown, Entered By History      Current Meds    [Held by provider] aspirin  81 mg Oral Daily     fexofenadine  180 mg Oral Daily     FLUoxetine  20 mg Oral Daily     guaiFENesin  600 mg Oral BID     heparin ANTICOAGULANT  5,000 Units Subcutaneous Q8H     [START ON 1/19/2021] hydrochlorothiazide  12.5 mg Oral Daily     [Held by provider] lisinopril-hydrochlorothiazide (ZESTORETIC) 20-12.5 mg combo dose   Oral Daily     pregabalin  75 mg Oral Daily     sodium chloride (PF)  3 mL Intracatheter Q8H     traZODone  150 mg Oral At Bedtime     ALLERGIES:    No Known Allergies    REVIEW OF SYSTEMS:  A comprehensive of systems was negative except as noted above.    SOCIAL HISTORY:   Social History     Socioeconomic History     Marital status:      Spouse name: Not on file     Number of children: Not on file     Years of education: Not on file     Highest education level: Not on file   Occupational History     Not on file   Social Needs     Financial resource strain: Not on file     Food insecurity     Worry: Not on file     Inability: Not on file     Transportation needs     Medical: Not on file     Non-medical: Not on file   Tobacco Use     Smoking status: Former Smoker     Packs/day:  "0.50     Years: 17.00     Pack years: 8.50     Smokeless tobacco: Never Used   Substance and Sexual Activity     Alcohol use: Not Currently     Drug use: Never     Sexual activity: Not on file   Lifestyle     Physical activity     Days per week: Not on file     Minutes per session: Not on file     Stress: Not on file   Relationships     Social connections     Talks on phone: Not on file     Gets together: Not on file     Attends Holiness service: Not on file     Active member of club or organization: Not on file     Attends meetings of clubs or organizations: Not on file     Relationship status: Not on file     Intimate partner violence     Fear of current or ex partner: Not on file     Emotionally abused: Not on file     Physically abused: Not on file     Forced sexual activity: Not on file   Other Topics Concern     Not on file   Social History Narrative     Not on file       FAMILY MEDICAL HISTORY:   Family History   Problem Relation Age of Onset     Heart Disease Mother        PHYSICAL EXAM:   Temp  Av.4  F (36.3  C)  Min: 96.6  F (35.9  C)  Max: 97.7  F (36.5  C)      Pulse  Av.4  Min: 77  Max: 117 Resp  Av  Min: 16  Max: 20  SpO2  Av.5 %  Min: 95 %  Max: 98 %       /48 (BP Location: Left arm)   Pulse 77   Temp 96.6  F (35.9  C) (Oral)   Resp 16   Ht 1.549 m (5' 1\")   Wt 84.2 kg (185 lb 11.2 oz)   SpO2 97%   BMI 35.09 kg/m     Date 21 0700 - 21 0659   Shift 4607-4716 8628-3911 3872-9401 24 Hour Total   INTAKE   P.O. 490   490   Shift Total(mL/kg) 490(5.82)   490(5.82)   OUTPUT   Urine 400   400   Shift Total(mL/kg) 400(4.75)   400(4.75)   Weight (kg) 84.23 84.23 84.23 84.23      Admit Weight: 84.2 kg (185 lb 11.2 oz)     GENERAL APPEARANCE: no distress, awake  EYES: no scleral icterus, pupils equal  Endo: no goiter  Pulmonary: lungs clear to auscultation with equal breath sounds bilaterally, no clubbing  CV: regular rhythm, normal rate, no rub   - Edema none  GI: " soft, nontender, normal bowel sounds  MS: B/l hand swelling and chronic arthritic changes  : no yoon  SKIN: Diffuse rash involving whole body except face, palpable purpuric, non-tender  NEURO: face symmetric, AOx3, speech normal    LABS:   CMP  Recent Labs   Lab 01/18/21  0656      POTASSIUM 5.3   CHLORIDE 115*   CO2 13*   ANIONGAP 10   GLC 87   BUN 85*   CR 2.73*   GFRESTIMATED 16*   GFRESTBLACK 18*   JACKIE 8.8   PROTTOTAL 5.8*   ALBUMIN 2.2*   BILITOTAL 0.4   ALKPHOS 83   AST 23   ALT 14     CBC  Recent Labs   Lab 01/18/21  0656   HGB 9.2*   WBC 7.0   RBC 3.31*   HCT 29.7*   MCV 90   MCH 27.8   MCHC 31.0*   RDW 15.9*        INR  Recent Labs   Lab 01/18/21  1143   INR 1.15*     ABGNo lab results found in last 7 days.   URINE STUDIES  Recent Labs   Lab Test 01/18/21  0745   COLOR Yellow   APPEARANCE Clear   URINEGLC Negative   URINEBILI Negative   URINEKETONE Negative   SG 1.014   UBLD Small*   URINEPH 5.5   PROTEIN 100*   NITRITE Negative   LEUKEST Trace*   RBCU 13*   WBCU 7*     Recent Labs   Lab Test 01/18/21  0745   UTPG 3.20*     PTH  No lab results found.  IRON STUDIES  No lab results found.    IMAGING:  All imaging studies reviewed by me.    EXAMINATION: US RENAL COMPLETE, 1/18/2021 11:23 AM      COMPARISON: None.     HISTORY: concern for hydronephrosis; patient with SHANT     TECHNIQUE: The kidneys and bladder were scanned in the standard  fashion with specialized ultrasound transducer(s) using both gray  scale and limited color/spectral Doppler techniques.     FINDINGS:     Right kidney: Measures 11.1 cm in length. Cortical parenchyma appears  mildly atrophic with otherwise normal echogenicity. No focal mass. No  hydronephrosis. No shadowing renal stones.     Left kidney: Measures 11.5 cm in length. Cortical parenchyma appears  mildly atrophic with otherwise normal echogenicity. No focal mass. No  hydronephrosis.      Bladder: Obscured and not visualized in this study.     Other: Partial  visualization of the liver appears slightly echogenic  however likely not clinically relevant given normal LFTs.    IMPRESSION:  1.  Kidneys demonstrate mild cortical atrophy bilaterally with  otherwise normal appearing parenchymal echotexture.  2.  No evidence of hydronephrosis, mass, renal stones, or other acute  renal findings.    Noreen Shin MD

## 2021-01-18 NOTE — CONSULTS
Rheumatology Consult Note    Reason for consult: Concern for vasculitis    Requested by Osmar Amezquita    DOS: 1/18/2021        HPI:    Sara Ashton is a 80 year old  female who was seen for evaluation and management of her purpuric rash, possible vasculitis.      She has purpuric rash from feet to waist x 2 weeks, started suddenly, scattered rash over fingers and trunk.    She has mild pain, swelling over hands x past month.    ROS is negative for other rashes, oral ulcers, fatigue, CP, SOB, cough, hemoptysis, abdominal pain, diarrhea, blood in stool, or sicca (excepts ome dry mouth in the hospital from dry air).      Former smoker (quit 20 yr ago after 25 yr of smoking <1 PPD), no ETOH. No fh/o autoimmune disorders.      ROS: A comprehensive ROS was done. Positives are per HPI.      Past Medical History:   Diagnosis Date     CKD (chronic kidney disease) stage 3, GFR 30-59 ml/min      COPD (chronic obstructive pulmonary disease) (H)      ILD (interstitial lung disease) (H)      No past surgical history on file.  No family history on file.  Social History     Socioeconomic History     Marital status: Not on file     Spouse name: Not on file     Number of children: Not on file     Years of education: Not on file     Highest education level: Not on file   Occupational History     Not on file   Social Needs     Financial resource strain: Not on file     Food insecurity     Worry: Not on file     Inability: Not on file     Transportation needs     Medical: Not on file     Non-medical: Not on file   Tobacco Use     Smoking status: Former Smoker     Packs/day: 0.50     Years: 17.00     Pack years: 8.50     Smokeless tobacco: Never Used   Substance and Sexual Activity     Alcohol use: Not Currently     Drug use: Never     Sexual activity: Not on file   Lifestyle     Physical activity     Days per week: Not on file     Minutes per session: Not on file     Stress: Not on file   Relationships     Social connections      Talks on phone: Not on file     Gets together: Not on file     Attends [employer]ist service: Not on file     Active member of club or organization: Not on file     Attends meetings of clubs or organizations: Not on file     Relationship status: Not on file     Intimate partner violence     Fear of current or ex partner: Not on file     Emotionally abused: Not on file     Physically abused: Not on file     Forced sexual activity: Not on file   Other Topics Concern     Not on file   Social History Narrative     Not on file     Patient Active Problem List   Diagnosis     Rash     No Known Allergies    Current Facility-Administered Medications   Medication     aspirin EC tablet 81 mg     fexofenadine (ALLEGRA) tablet 180 mg     FLUoxetine (PROzac) capsule 20 mg     guaiFENesin (MUCINEX) 12 hr tablet 600 mg     guaiFENesin (MUCINEX) 12 hr tablet 600 mg     heparin ANTICOAGULANT injection 5,000 Units     lactated ringers BOLUS 500 mL     lidocaine (LMX4) cream     lidocaine 1 % 0.1-1 mL     lisinopril-hydrochlorothiazide (ZESTORETIC) 20-12.5 mg combo dose     melatonin tablet 1 mg     ondansetron (ZOFRAN-ODT) ODT tab 4 mg    Or     ondansetron (ZOFRAN) injection 4 mg     polyethylene glycol (MIRALAX) Packet 17 g     pregabalin (LYRICA) capsule 75 mg     senna-docusate (SENOKOT-S/PERICOLACE) 8.6-50 MG per tablet 1 tablet    Or     senna-docusate (SENOKOT-S/PERICOLACE) 8.6-50 MG per tablet 2 tablet     sodium chloride (OCEAN) 0.65 % nasal spray 1 spray     sodium chloride (PF) 0.9% PF flush 3 mL     sodium chloride (PF) 0.9% PF flush 3 mL     traZODone (DESYREL) tablet 150 mg                 Her records were reviewed.    Results for orders placed or performed during the hospital encounter of 01/17/21   Basic metabolic panel     Status: Abnormal   Result Value Ref Range    Sodium 138 133 - 144 mmol/L    Potassium 5.3 3.4 - 5.3 mmol/L    Chloride 115 (H) 94 - 109 mmol/L    Carbon Dioxide 13 (L) 20 - 32 mmol/L    Anion Gap 10  3 - 14 mmol/L    Glucose 87 70 - 99 mg/dL    Urea Nitrogen 85 (H) 7 - 30 mg/dL    Creatinine 2.73 (H) 0.52 - 1.04 mg/dL    GFR Estimate 16 (L) >60 mL/min/[1.73_m2]    GFR Estimate If Black 18 (L) >60 mL/min/[1.73_m2]    Calcium 8.8 8.5 - 10.1 mg/dL   CBC with platelets differential     Status: Abnormal   Result Value Ref Range    WBC 7.0 4.0 - 11.0 10e9/L    RBC Count 3.31 (L) 3.8 - 5.2 10e12/L    Hemoglobin 9.2 (L) 11.7 - 15.7 g/dL    Hematocrit 29.7 (L) 35.0 - 47.0 %    MCV 90 78 - 100 fl    MCH 27.8 26.5 - 33.0 pg    MCHC 31.0 (L) 31.5 - 36.5 g/dL    RDW 15.9 (H) 10.0 - 15.0 %    Platelet Count 281 150 - 450 10e9/L    Diff Method Automated Method     % Neutrophils 50.5 %    % Lymphocytes 29.7 %    % Monocytes 9.2 %    % Eosinophils 8.6 %    % Basophils 1.0 %    % Immature Granulocytes 1.0 %    Nucleated RBCs 0 0 /100    Absolute Neutrophil 3.5 1.6 - 8.3 10e9/L    Absolute Lymphocytes 2.1 0.8 - 5.3 10e9/L    Absolute Monocytes 0.6 0.0 - 1.3 10e9/L    Absolute Eosinophils 0.6 0.0 - 0.7 10e9/L    Absolute Basophils 0.1 0.0 - 0.2 10e9/L    Abs Immature Granulocytes 0.1 0 - 0.4 10e9/L    Absolute Nucleated RBC 0.0    Erythrocyte sedimentation rate auto     Status: Abnormal   Result Value Ref Range    Sed Rate 34 (H) 0 - 30 mm/h   CRP inflammation     Status: Abnormal   Result Value Ref Range    CRP Inflammation 45.0 (H) 0.0 - 8.0 mg/L   Protein electrophoresis     Status: None (In process)   Result Value Ref Range    Albumin Fraction PENDING 3.7 - 5.1 g/dL    Alpha 1 Fraction PENDING 0.2 - 0.4 g/dL    Alpha 2 Fraction PENDING 0.5 - 0.9 g/dL    Beta Fraction PENDING 0.6 - 1.0 g/dL    Gamma Fraction PENDING 0.7 - 1.6 g/dL    Monoclonal Peak PENDING 0.0 g/dL    ELP Interpretation: PENDING    Complement C3     Status: None   Result Value Ref Range    Complement C3 126 81 - 157 mg/dL   Complement C4     Status: None   Result Value Ref Range    Complement C4 37 13 - 39 mg/dL   Protein  random urine with Creat Ratio      "Status: Abnormal   Result Value Ref Range    Protein Random Urine 2.81 g/L    Protein Total Urine g/gr Creatinine 3.20 (H) 0 - 0.2 g/g Cr   UA with Microscopic reflex to Culture     Status: Abnormal    Specimen: Urine clean catch   Result Value Ref Range    Color Urine Yellow     Appearance Urine Clear     Glucose Urine Negative NEG^Negative mg/dL    Bilirubin Urine Negative NEG^Negative    Ketones Urine Negative NEG^Negative mg/dL    Specific Gravity Urine 1.014 1.003 - 1.035    Blood Urine Small (A) NEG^Negative    pH Urine 5.5 5.0 - 7.0 pH    Protein Albumin Urine 100 (A) NEG^Negative mg/dL    Urobilinogen mg/dL Normal 0.0 - 2.0 mg/dL    Nitrite Urine Negative NEG^Negative    Leukocyte Esterase Urine Trace (A) NEG^Negative    Source Clean catch urine     WBC Urine 7 (H) 0 - 5 /HPF    RBC Urine 13 (H) 0 - 2 /HPF    Squamous Epithelial /HPF Urine <1 0 - 1 /HPF    Transitional Epi <1 0 - 1 /HPF   Albumin Random Urine Quantitative with Creat Ratio     Status: None (In process)   Result Value Ref Range    Creatinine Urine 88 mg/dL    Albumin Urine mg/L PENDING mg/L    Albumin Urine mg/g Cr PENDING 0 - 25 mg/g Cr               Ph.E:    /44   Pulse 99   Temp 97.5  F (36.4  C) (Oral)   Resp 18   Ht 1.549 m (5' 1\")   Wt 84.2 kg (185 lb 11.2 oz)   SpO2 96%   BMI 35.09 kg/m        Constitutional: WD/WN. Pleasant. In no acute distress sitting in chair, very comfortable.  Eyes: EOM intact, sclera anicteric, conj not injected  HEENT: No oral ulcers or thrush.   Neck: No cervical LAP   Chest: Clear to auscultation bilaterally  CV: RRR, no murmurs/ rubs or gallops. 1+edema   GI: Abdomen is soft and non tender.   MS: OA deformities of fingers and toes with Heberden nodes and overlap toes plus healed infected callus over L 2nd toe, bunions. She has tenderness across R MCPs with trace synovitis over R 3rd MCP. Can't make a full fist  Skin: dark purpuric rash starting over feet all the way to the waist with some " scattered purpura over upper chest and hands.   Neuro: A&O x 3. Grossly non focal  Psych: NL affect     Assessment/ plan:    Purpuric rash plus hematuria+proteinuria plus some degree of inflammatory arthritis in addition to OA, concern for vasculitis. Need to rule out ANCA vasculitis, cryo, HSP/IgA vasculitis. She has no features of SLE on exam.    Agree with renal consult to do kidney biopsy and ordered labs (will add RF, anti-CCP, PR3/MPO) but she needs derm consult and skin biopsy. Will follow results.    ADDENDUM: Skin biopsy was done, result is pending. Further recommendation about Tx will be based on skin biopsy, renal biopsy, lab results. Discussed with patient.    Margi Brandt MD  Rheumatology attending        Orders Placed This Encounter   Procedures     US Renal Complete     Basic metabolic panel     CBC with platelets differential     Anti Nuclear Christy IgG by IFA with Reflex     Erythrocyte sedimentation rate auto     CRP inflammation     ANCA IgG by IFA with Reflex to Titer     Cryoglobulin quantitative     Protein electrophoresis     Hepatitis B core antibody     Hepatitis B Surface Antibody     Hepatitis B surface antigen     Hepatitis C antibody     Antistreptolysin O     Complement C3     Complement C4     Protein  random urine with Creat Ratio     UA with Microscopic reflex to Culture     HIV Antigen Antibody Combo     HIV Antigen Antibody Combo     Albumin Random Urine Quantitative with Creat Ratio

## 2021-01-18 NOTE — PLAN OF CARE
Pt admitted at 2130 from OSH with possible vasculitis. Plan for rheumatology consult.    A&Ox4. HR 110s, /52, other VSS. Red rash to BLE and torso. BL hand edema. L 2nd toe infection healing. Up SBA. PIV SL'd. On regular diet.

## 2021-01-18 NOTE — PHARMACY-ADMISSION MEDICATION HISTORY
"  Admission Medication History Completed by Pharmacy    See [facility] Admission Navigator for allergy information, preferred outpatient pharmacy, prior to admission medications and immunization status.     Medication History Sources:     Patient    Care Everywhere    Changes made to PTA medication list (reason):    Added: Artifical tears, Probiotic, Lisinopril-hydrochlorothiazide, Calcium, Vitamin D, Flonase, Omeprazole, Oxybutynin.    Deleted: Aspirin- pt reports she stopped taking because \"my blood got too thin\"    Changed: Pregabalin changed to BID.    Additional Information:    Patient was a fair historian. She was unable to recite to me a medication list, but was able to confirm medications based on indication. She was unaware of specific doses, but could describe the number of pills and frequency she was taking. I was not able to review a dispense history as no consent in [facility], and I could not confirm with her OP pharmacy.    Prior to Admission medications    Medication Sig Last Dose Taking? Auth Provider   acetaminophen (TYLENOL) 650 MG CR tablet Take 650 mg by mouth every 6 hours as needed  Yes Reported, Patient   calcium carbonate 500 mg, elemental, (OSCAL 500) 1250 (500 Ca) MG TABS tablet Take 1 tablet by mouth daily  Yes Unknown, Entered By History   fexofenadine (ALLEGRA) 180 MG tablet Take 180 mg by mouth daily  Yes Reported, Patient   FLUoxetine (PROZAC) 20 MG capsule Take 20 mg by mouth daily  Yes Reported, Patient   fluticasone (FLONASE) 50 MCG/ACT nasal spray Spray 2 sprays into both nostrils daily   Yes Unknown, Entered By History   hypromellose (ARTIFICIAL TEARS) 0.5 % SOLN ophthalmic solution 1 drop 4 times daily as needed for dry eyes  Yes Unknown, Entered By History   lisinopril-hydrochlorothiazide (ZESTORETIC) 20-12.5 MG tablet Take 1 tablet by mouth daily  Yes Unknown, Entered By History   NONFORMULARY Take 1 tablet by mouth daily Probiotic- unsure content.  Yes Unknown, Entered By History "   omeprazole (PRILOSEC) 20 MG DR capsule Take 20 mg by mouth daily  Yes Unknown, Entered By History   oxybutynin ER (DITROPAN-XL) 5 MG 24 hr tablet Take 5 mg by mouth daily  Yes Unknown, Entered By History   pregabalin (LYRICA) 75 MG capsule Take 75 mg by mouth 2 times daily   Yes Reported, Patient   traZODone (DESYREL) 150 MG tablet Take 150 mg by mouth At Bedtime  Yes Reported, Patient   vitamin D3 (CHOLECALCIFEROL) 50 mcg (2000 units) tablet Take 1 tablet by mouth daily  Yes Unknown, Entered By History       Date completed: 01/18/21    Medication history completed by: Ifeoma Méndez [facility]

## 2021-01-18 NOTE — PLAN OF CARE
"Pt AVSS. HR tachy earlier 100-117  but has since come down to the 90's. Pt has not slept, nasal congestion a problem. Pt med with gueneficin and saline nasal spray. Pt denied c/o's pain. Hands bilat swollen, no pain, only \"stiffness\". Pt with noted purpural rash dark red/purple in color noted in large patches on legs, thighs, trunk, arms. Petechiae on hands. Wedding band on left ring finger extremely swollen and unable to remove via attempt at this time. Pt abd obese, +bs. Lungs dim in bases and LUA noted after back from BR. Sats on RA 95%. PIV restarted into right arm. IV bolus LR 500cc infusing over 4 hr. Pt up to BR to void, unable to send ua/c bc pt missed the hat. Plan for Nephrology and Rheumatology consults today. Monitor I/O. Monitor labs. Monitor rash closely.  "

## 2021-01-18 NOTE — PLAN OF CARE
Patient needs a dermatology consult for assessment of the skin rash and skin biopsy.     Rheumatology will do an official consult later today.    Margi Brandt MD  Rheumatology staff

## 2021-01-18 NOTE — PROGRESS NOTES
Resident/Fellow Attestation   I, Michelle Elder, was present with the medical student who participated in the service and in the documentation of the note.  I have verified the history and personally performed the physical exam and medical decision making.  I agree with the assessment and plan of care as documented in the note.      Key findings: 80 year old female with rash concerning for vasculitis and SHANT.    iMchelle Elder MD  PGY4  Date of Service (when I saw the patient): 01/18/21    [facility]     Progress Note - [place]oon 5 Service        Date of Admission:  1/17/2021    Assessment & Plan       Sara Ashton is a 80 year old female admitted on 1/17/2021. She has a PMH of COPD, ILD, CKD3, HTN, and HLD who presented to OSH with 2 weeks of progressive skin rash and new onset hand swelling in the setting of recent L toe cellulitis and abx use. Also with SHANT in setting of progressively worsening kidney function. Overall picture most c/w small to medium sized vasculitis.     Changes Today:  - F/u diagnostic labs  - STOP IVFs  - Rheum and Derm consults  - Nephrology consult    # Palpable purpura  # Extremity swelling  # Possible vasculitis  P/w 2 weeks of scattered purpuric rash, initially in lower extremities but now extending to level of chest. Also developed swelling and tightness of the hands on the day of presentation, now has pitting edema with associated discomfort in BLE. Reported h/o RA on NSAIDs, no family h/o autoimmune disorders aside from DM1 in niece. Elevated eosinophils and ESR/CRP at OSH, confirmed here. Suspect small to medium sized vasculitis provoked by recent cellulitis vs NSAID use, possibly HSP. Less likely DIHS given lack of fever, hepatitis, or LAD. Unlikely malignancy given lack of weight loss or constitutional sxs, although pt admits that she is overdue for colon and breast cancer screening. No treatment initiated at this time, awaiting  diagnostics and Rheumatology recs.  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 Cr increased from 1.5 to 3.26 over past 2 months. No e/o hypoperfusion or obstruction. Has been taking meloxicam regularly for RA, which can cause kidney damage. However, overall picture most c/w renal vasculitis. UA at OSH with proteinuria, WBCs + RBCs, and hyaline casts. Borderline nephrotic range proteinuria with Protein/Cr ratio of 3.2, albumin/Cr significantly elevated to 2816.42. Also has NAGMA with baseline bicarb ~20 in the recent months, now down to 13. Likely related to renal disease. No e/o diarrhea or adrenal insufficiency. Not on any meds that are known to cause acidosis.   - Nephrology consult  - Stop IVFs after completion of 500mL LR bolus    # L toe cellulitis  Diagnosed with cellulitis of 2nd digit of L foot on 1/1/21. Treated with a course of cephalexin and doxycycline, which she completed on 1/8/21. Planning to see vascular surgeon for possible amputation/debridement on 1/21/21.   - Outpatient f/u with vascular surgery as scheduled    # COPD  # ILD  Smoked 0.5 PPD for ~20 years, quit ~20 years ago. Previously followed with Pulm, last seen in 6/2019 per chart review. Picture was c/f mild underlying fibrosis and possible component of COPD with markedly decreased DLCO at 34% and relative preservation of lung volumes. Planned to f/u with repeat PFTs in 6 mo with possible repeat CT based on results. Currently only on rescue inhalers at home, no maintenance inhalers.  - Continue PTA albuterol PRN  - F/u with Pulm as outpatient     Diet: Combination Diet Regular Diet Adult    Fluids: PO  Lines: PIV x1 RUE  DVT Prophylaxis:  Heparin SQ  Nunez Catheter: not present  Code Status: Full Code         Disposition Plan   Expected discharge: 2 - 3 days, recommended to prior living arrangement once adequate pain management, tolerating PO medications, renal function improved, appropriate discharge plan in place.  Entered: Dinorah Lomeli 01/18/2021, 11:32 AM     The patient's care was discussed with the Attending Physician, Dr. Coles.    Dinorah Lomeli, MS4  [address]   Please see sign in/sign out for up to date coverage information  ______________________________________________________________________    Interval History   Initially tachy to 117 on arrival to the unit, now in the 90s. Otherwise afebrile and VSS overnight. Describes feeling congested, which is interfering with her sleep. Has a h/o allergies and sinus issues. Received guanifecin and saline nasal spray with good relief. Continues to endorse swelling and stiffness in hands. Also with new swelling and tightness of lower extremities. Rash has spread to chest and is now pruritic.     Data reviewed today: I reviewed all medications, new labs and imaging results over the last 24 hours. I personally reviewed:    Renal US 1/18/21  Awaiting official read, but no obvious hydronephrosis visible in either kidney.    Physical Exam   Vital Signs: Temp: 97.5  F (36.4  C) Temp src: Oral BP: 132/44 Pulse: 99   Resp: 18 SpO2: 96 % O2 Device: None (Room air)    Weight: 185 lbs 11.2 oz  Constitutional: Awake, sitting up in bedside chair, eating breakfast. No acute distress.   HEENT: Normocephalic, atraumatic. Lids and lashes normal, EOMI, conjunctivae clear, sclera anicteric. Moist mucous membranes, no oral lesions, no tonsillar erythema or exudates. Maxillary dentures in place.   Neck: Supple.   Resp: Lungs CTAB, no increased WOB. No crackles or wheezing.   CV: RRR, normal S1 and S2, no S3 or S4. No murmurs, rubs, or gallops. 2+  pitting edema on R, 3+ pitting edema on L; extending to knees. No associated warmth or erythema. Peripheral pulses difficult to palpate 2/2 swelling.   Abd: Soft, non-tender, non-distended. Positive bowel sounds.   Skin: Scattered purpura throughout feet, legs, abdomen, arms, and chest. Rash is non-blanchable and non-tender to palpation.   MSK: Able to move all extremities spontaneously and purposefully. Normal tone. Swelling and decreased motion throughout hands. No localized warmth or swelling of hand joints. Firm nodules on 1st and 2nd digits of bilateral hands.   Neuropsych: Alert, oriented. Pleasant, calm, cooperative. Normal affect.           Data   Recent Labs   Lab 01/18/21  0656   WBC 7.0   HGB 9.2*   MCV 90         POTASSIUM 5.3   CHLORIDE 115*   CO2 13*   BUN 85*   CR 2.73*   ANIONGAP 10   JACKIE 8.8   GLC 87

## 2021-01-18 NOTE — PLAN OF CARE
VSS. Denies pain. Up with SBA, ambulated in peoples. Tolerating regular diet. No c/o nausea. Renal ultrasound done this AM. Dermatology saw patient and performed punch biopsy. Red/purple rash throughout legs, back, flank, arms. Per team, please elevate legs while in bed to help with LE edema. Pt's son, Spike, called and spoke with patient. Spike reported to staff that pt's  has dementia, so it would be best to call Spike with updates. Continue to monitor and with POC.

## 2021-01-18 NOTE — CONSULTS
[facility] Inpatient Consult Dermatology Note    Date of Admission: 1/17/2021  Encounter Date: 01/18/21  Consult Date: 01/18/21     Reason for Consultation:   Concern for vasculitis    Assessment/Recommendations:    1. Purpuric targetoid and annular skin eruption  - differential based on history and morphology includes linear IgA bullous dermatoses (favored based on morphology), cutaneous small vessel vasculitis, and disseminated granuloma annulare  - causes of LCV/small vessel vasculitis are manifold and include infection (bacterial, hepatitis, HIV), inflammatory processes (cryoglobulinemia, cutaneous small vessel vasculitides i.e. Henoch-Schonlein purpura, granulomatosis with polyangiitis, eosinophilic granulomatosis with polyangiitis), rheumatologic diseases (lupus, rheumatoid vasculitis, Sjogren's syndrome), malignancy (multiple myeloma, leukemia/lymphoma), and drug-induced vasculitides  - Linear IgA bullous dermatosis (LABD) is a rare immune-mediated disorder due to homogenous deposition of IgA in a linear band at the epidermal-dermal junction. Adult LABD often presents with severe mucosal erosions and may be assocaited with inflammatory bowel disease and hematologic or solid organ malignancies. LABD can be drug-induced and usually occurs 1-4 weeks after the offending medication is started and resolves over weeks. Frequent causes include vancomycin, TMP-SMX, penicillin, NSAIDs, cyclosporine, furosemide, ACEI, simvastatin. IgA nephropathy can be rarely associated with drug-induced cases.  - follow up on remainder of rheumatologic workup (JEFE, ACNAs, anti-SHABNAM, RF, complements, anti-cardiolipin, anti-beta 2 glycoprotein, cryoglobulins, HIV, ASO, SPEP, TB, hepatitis B and C)  - recommend SPEP/UPEP to evaluate for B-cell dyscrasias  - recommend confirm age-appropriate cancer screening (colonscopies, mammograms, CT)  - recommend G6PD deficiency testing in anticipation of treatment with dapsone  pending biopsy results  - agree with nephrology consult for proteinuria and worsening kidney function  - punch biopsy performed (see procedure note), dress biopsy site with Vaseline and bandage daily  - triamcinolone 0.1% ointment BID to all affected areas     Procedures Performed:  Punch biopsy procedure  - location(s): left thigh, right back medial, right back lateral  After discussion of benefits and risks including but not limited to bleeding/bruising, pain/swelling, infection, scar, incomplete removal, nerve damage/numbness, recurrence, and non-diagnostic biopsy, written consent, verbal consent and photographs obtained, time-out performed, area cleaned with alcohol, 1% lidocaine injected to obtain anesthesia, 4 mm punch biopsy performed, 4-0 proline sutures used to approximate epidermal edges (to be removed in 10-14 days), Vaseline and bandage to be applied to the wound daily.    Thank you for the dermatology consultation. Please do not hesitate to contact the dermatology resident/faculty on call for any additional questions or concerns. We will continue to follow.    Patient discussed with attending physician, Dr. Sil Arriaga MD  Dermatology Resident  [facility]  I, Belem Mujica MD, reviewed the photos and medical record of this patient with the resident and agree with the resident s findings and plan of care as documented in the resident s note. We reviewed the biopsy procedures.  ________________________________    Relevant History:  80-year-old woman with history of COPD, pulmonary fibrosis, CKD, HTN, HLD presented with skin eruption, transferred to [facility] for expedited dermatology and rheumatology evaluation for concern for vasculitis  - diagnosed with cellulitis of left foot 1/1/21, treated with cephalexin and doxycycline for 7 days, over past 1-2 weeks, noticed skin eruption on lower legs, thighs, groin, abdomen, arms; not painful or itchy  - states that she needs to get the left  "toe taken care of by podiatry, wondering how long she will need to be in the hospital  - patient feels well, denies joint pain, fevers, bloody urine, abdominal pain, diarrhea  - states she has had trouble sleeping and has been feeling tired, but otherwise does not feel sick    Physical Exam:  Vitals: /44   Pulse 99   Temp 97.5  F (36.4  C) (Oral)   Resp 18   Ht 1.549 m (5' 1\")   Wt 84.2 kg (185 lb 11.2 oz)   SpO2 96%   BMI 35.09 kg/m    GEN: This is a well developed, well-nourished female in no acute distress, in a pleasant mood.    SKIN: Skin examined including the head, neck, bilateral arms, chest, back, abdomen, bilateral legs, groin, buttocks, digits, nails  - diffusely scattered crops of bright red annular urticarial and purpuric targetoid lalito-like papules and plaques with central clearing: bilateral legs, back, breast, flank, arms  - diffuse edema: lower extremities, feet  - crusted hyperkeratotic necrotic plaque with focal eschar overlying deep tunneling ulcer: left 2nd toe                     Past Medical History:   Patient Active Problem List   Diagnosis     Rash     Medications:  Current Facility-Administered Medications   Medication     [Held by provider] aspirin EC tablet 81 mg     fexofenadine (ALLEGRA) tablet 180 mg     FLUoxetine (PROzac) capsule 20 mg     guaiFENesin (MUCINEX) 12 hr tablet 600 mg     guaiFENesin (MUCINEX) 12 hr tablet 600 mg     heparin ANTICOAGULANT injection 5,000 Units     [START ON 1/19/2021] hydrochlorothiazide (HYDRODIURIL) tablet 12.5 mg     lidocaine (LMX4) cream     lidocaine 1 % 0.1-1 mL     [Held by provider] lisinopril-hydrochlorothiazide (ZESTORETIC) 20-12.5 mg combo dose     melatonin tablet 1 mg     ondansetron (ZOFRAN-ODT) ODT tab 4 mg    Or     ondansetron (ZOFRAN) injection 4 mg     polyethylene glycol (MIRALAX) Packet 17 g     pregabalin (LYRICA) capsule 75 mg     senna-docusate (SENOKOT-S/PERICOLACE) 8.6-50 MG per tablet 1 tablet    Or     " senna-docusate (SENOKOT-S/PERICOLACE) 8.6-50 MG per tablet 2 tablet     sodium chloride (OCEAN) 0.65 % nasal spray 1 spray     sodium chloride (PF) 0.9% PF flush 3 mL     sodium chloride (PF) 0.9% PF flush 3 mL     traZODone (DESYREL) tablet 150 mg     Staff Involved:  Resident/Staff

## 2021-01-18 NOTE — H&P
[facility]     History and Physical - [employer] Service        Date of Admission:  1/17/2021    Assessment & Plan   Sara Ashton is a 80 year old female who has a PMHx of COPD, pulmonary fibrosis, CKD3, HTN, HLD, who presented to OSH with ongoing skin rash. She was ultimately transferred to [facility] due to concern for vasculitis.    #Purpura  #C/F Vasculitis  DDx vasculitis versus infection/malignancy. Purpura scattered over bilateral legs, groin, arms and abdomen. Started progression form legs distally. Noticed swelling in bilateral hands today with stiffness in movement. No history of autoimmune disorders in family. Eosinophils elevated and ESR/CRP elevated at OSH. No treatment initiated at this time, awiating diagnostic results.  - Rheumatology consultation in AM  - Consider skin biopsy in AM  - CBC w/diff  - CRP  - ESR  - BMP  - ANCA  - JEFE  - Complement: 3, 4  - Cryoglobulin  - Hepatitis Virus: B, C  - Antistreptolysin O  - Protein electrophoresis    #Cellulitis of left foot digit  Patient was treated with cephalexin and doxycycline on 1/1/2021. Finished course of antibiotics on 1/8/2021. Planning to see vascular surgeon on 1/21/2021 for possible amputation.    #COPD  #ILD  Patient is not on any maintenance inhalers at this time. Smoked 0.5 packs a day for approximately 20 years. Patient only on rescue inhalers at home. Previously seen with pulm, PFTs demonstrated marked decrease in her DLCO at 34% and progression of her ILD.  - Continue PTA albuterol PRN    #SHANT  #CKD3  Baseline creatinine elevated to 1.3 range with GFR in 30s-40s. Creatine jumped from 1.5 to 3.26 over past 2 months. Unclear etiology of CKD3, suspect worsening kidney function in the setting of possible vasculitis.  - BMP in AM  - Nephrology consult  -  ml over 4 hrs  - UA with microscopic  - Protein/Creatinine ratio    #Tricuspid Valve Insufficiency  - Conservative IV  fluids    #Major Depressive Disorder  - Continue PTA fluoxetine    #Insomnia  - Continue PTA trazadone     Diet: Combination Diet Regular Diet Adult    Fluids: None  DVT Prophylaxis: Heparin SQ  Nunez Catheter: not present  Code Status: Full Code      Disposition Plan   Expected discharge: 2 - 3 days, recommended to prior living arrangement once patient is appropriately assessed for new onset rash..  Entered: John Delacruz MD 01/17/2021, 11:24 PM     The patient's care was discussed with the Attending Physician, Dr. Obrien.    John Delacruz MD  Internal Medicine, PGY-1  [facility]   Contact information available via [facility] Paging/Directory  Please see sign in/sign out for up to date coverage information  ______________________________________________________________________    Chief Complaint   Rash concerning for vasculitis    History is obtained from the patient    History of Present Illness   Sara Ashton is a 80 year old female who has a PMHx of COPD, pulmonary fibrosis, CKD3, HTN, HLD, who presented to OSH with ongoing skin rash. She was ultimately transferred to [facility] due to concern for vasculitis.    Patient was diagnosed with cellulitis in the left foot on 1/1/2021, received 7 days of cephalexin and doxycycline. Over the past 1-2 weeks the toe has not significantly improved; however, the patient has started noticing a rash that has developed in her lower legs and spread to her thighs, groin, abdomen, and arms. The rash is not painful or pruritic. She has not taken any additional medications to treat this rash or applied any creams/lotions.    Patient became increasingly concerned and sought out clinical care on 1/17 for the rash at a clinic. She was sent to the ED where they were concerned enough for vasculitis to transfer her to [facility] to be admitted.    Patient lives with  in [place], no pets. No recent travel  or walks through wooded areas.    Review of Systems    The 10 point Review of Systems is negative other than noted in the HPI or here.     Past Medical History    I have reviewed this patient's medical history and updated it with pertinent information if needed.   Past Medical History:   Diagnosis Date     CKD (chronic kidney disease) stage 3, GFR 30-59 ml/min      COPD (chronic obstructive pulmonary disease) (H)      ILD (interstitial lung disease) (H)         Past Surgical History   I have reviewed this patient's surgical history and updated it with pertinent information if needed.  No past surgical history on file.     Social History   I have reviewed this patient's social history and updated it with pertinent information if needed. Sara Ashton  has a smoking hx of 10 pack years.    Family History   No significant family history, including no history of: autoimmune disorders.    Prior to Admission Medications   None     Allergies   No Known Allergies    Physical Exam   Vital Signs: Temp: 97.6  F (36.4  C) Temp src: Oral BP: (!) 158/52 Pulse: 117   Resp: 20 SpO2: 98 % O2 Device: None (Room air)    Weight: 185 lbs 11.2 oz    Constitutional: awake, alert, cooperative, no apparent distress, and appears stated age  Eyes: Lids and lashes normal, pupils equal, round and reactive to light, extra ocular muscles intact, sclera clear, conjunctiva normal  ENT: Normocephalic, without obvious abnormality, atraumatic, sinuses nontender on palpation, external ears without lesions, oral pharynx with moist mucous membranes, tonsils without erythema or exudates, gums normal and good dentition.  Hematologic / Lymphatic: no cervical lymphadenopathy and no supraclavicular lymphadenopathy  Respiratory: No increased work of breathing, good air exchange, clear to auscultation bilaterally, no crackles or wheezing  Cardiovascular: Normal apical impulse, regular rate and rhythm, normal S1 and S2, no S3 or S4, and no murmur noted  GI: No  scars, normal bowel sounds, soft, non-distended, non-tender, no masses palpated, no hepatosplenomegally  Skin: Scattered purpura not tender to palpation on feet, legs, thighs, groin, abdomen, and arms  Musculoskeletal:  swelling of hands and stiffness of hand joints.  Full range of motion noted.  Motor strength is 5 out of 5 all extremities bilaterally.  Tone is normal.  Neurologic: Awake, alert, oriented to name, place and time.  Cranial nerves II-XII are grossly intact.  Motor is 5 out of 5 bilaterally.  Cerebellar finger to nose, heel to shin intact.  Sensory is intact.  Babinski down going, Romberg negative, and gait is normal.    Data   Data reviewed today: I reviewed all medications, new labs and imaging results over the last 24 hours.    CXR (1/17/2021) at OSH  Stable likely chronic interstitial coarsening likely related to nonspecific fibrotic changes. No definite superimposed acute airspace disease. No pleural effusion. Normal heart size.    Labs at OSH Remarkable for:  CRP 4  K 5.4  Cl 114  CO2 13  BUN 91  Cr 3.26  Albumin 2.8  Hgb 10.8  Eosinophils 8%  ESR 38

## 2021-01-19 NOTE — PLAN OF CARE
VSS. Gets up independently in room. Denies pain/nausea. Tolerating regular diet. PIV saline locked. Voiding. Reports having a BM today. Occasional non-productive cough. Nasal spray and mucinex given for congestion/sinus drainage. Rash remains the same, pt denies itching. Plan for NPO @ midnight for renal biopsy tomorrow. Continue to monitor and with POC.

## 2021-01-19 NOTE — PLAN OF CARE
Patient care [id number]  VSS. Denies pain and nausea. Rash on legs and lower trunk unchanged, not bothersome per pt. Derm consult today, awaiting biopsy results. Pt reports feeling congested and has some sinus drainage, PRN and scheduled Mucinex given. Voiding spontaneously, had BM yesterday. PIV SL. Up with SBA. Continue with plan of care.

## 2021-01-19 NOTE — PROGRESS NOTES
[facility]   Progress Note - Marmalinda 5 Service        Date of Admission:  1/17/2021    Assessment & Plan       Sara Ashton is a 80 year old female admitted on 1/17/2021. She has a PMH of COPD, ILD, CKD3, HTN, and HLD who presented to OSH with 2 weeks of progressive skin rash and new onset hand swelling in the setting of recent left toe cellulitis and antibiotic use. Also with SHANT in setting of progressively worsening kidney function. Overall picture most consistent vasculitis: IgA, etc. Hemodynamically stable, with volume status improved from yesterday. Awaiting renal biopsy, planned for 1/20.    Changes Today:  - Add on G6PD enzyme testing    Palpable purpura  Possible vasculitis  Suspect small to medium sized vasculitis, possibly provoked by recent cellulitis vs antibiotic use, possibly Henoch Schonlein purpura. Less likely DIHS given lack of fever, hepatitis, or LAD. Unlikely malignancy given lack of weight loss or constitutional sxs, although pt admits that she is overdue for colon and breast cancer screening. No treatment initiated at this time, awaiting diagnostics and Rheumatology recs. Complements normal, MPO & PR3 normal, Hep B and C normal, HIV neg.  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 Cr increased from 1.5 to 3.26 over past 2 months. No e/o hypoperfusion or obstruction. Has been taking meloxicam regularly for RA, which can cause renal injury. However, overall picture most c/w renal vasculitis. UA at OSH with proteinuria, WBCs + RBCs, and hyaline casts. Borderline nephrotic range proteinuria with Protein/Cr ratio of 3.2, albumin/Cr significantly elevated to 2816.42. Also has NAGMA  with baseline bicarb ~20 in the recent months, now down to 13. Likely related to renal disease. No e/o diarrhea or adrenal insufficiency. Not on any meds that are known to cause acidosis.   - Nephrology consulted: plan for renal biopsy tomorrow 1/20    Left toe cellulitis  Diagnosed with cellulitis of 2nd digit of L foot on 1/1/21. Treated with a course of cephalexin and doxycycline, which she completed on 1/8/21. Planning to see vascular surgeon for possible amputation/debridement on 1/21/21.   - Outpatient f/u with vascular surgery as scheduled    COPD  ILD  Smoked 0.5 PPD for ~20 years, quit ~20 years ago. Previously followed with Pulm, last seen in 6/2019 per chart review. Picture was c/f mild underlying fibrosis and possible component of COPD with markedly decreased DLCO at 34% and relative preservation of lung volumes. Planned to f/u with repeat PFTs in 6 mo with possible repeat CT based on results. Currently only on rescue inhalers at home, no maintenance inhalers.  - Continue PTA albuterol PRN  - F/u with Pulm as outpatient     Diet: Combination Diet Regular Diet Adult    Fluids: PO  Lines: PIV x1 RUE  DVT Prophylaxis: Heparin SQ  Nunez Catheter: not present  Code Status: Full Code         Disposition Plan   Expected discharge: 2 - 3 days, recommended to prior living arrangement once adequate pain management, tolerating PO medications, renal function improved, appropriate discharge plan in place.  Entered: Michelle Elder MD 01/19/2021, 7:30 AM     The patient's care was discussed with the Attending Physician, Dr. Coles.    Michelle Elder MD  Internal Medicine & Pediatrics, PGY-4  [phone]  _______________    Interval History   No acute events overnight. Rash doesn't seem to be worsening. Legs still not painful or itchy. Tolerating PO fine. Has been trying to keep legs elevated and swelling feels better this morning. No shortness of breath or chest pain.    Data reviewed today: I reviewed all  medications, new labs and imaging results over the last 24 hours. I personally reviewed:    Renal US 1/18/21  Awaiting official read, but no obvious hydronephrosis visible in either kidney.    Physical Exam   Vital Signs: Temp: 97.9  F (36.6  C) Temp src: Oral BP: (!) 146/57 Pulse: 83   Resp: 18 SpO2: 96 % O2 Device: None (Room air)    Weight: 185 lbs 11.2 oz  Constitutional: Awake, sitting up in bedside chair, eating breakfast. No acute distress.   HEENT: Normocephalic, atraumatic. Lids and lashes normal, EOMI, conjunctivae clear, sclera anicteric. Moist mucous membranes  Neck: Supple.   Resp: Lungs CTAB, no increased WOB. No crackles or wheezing.   CV: RRR, normal S1 and S2, no S3 or S4. No murmurs, rubs, or gallops. 1+ pitting edema on R, 1+ pitting edema on L; extending to mid shins. No associated warmth or erythema.  Abd: Soft, non-tender, non-distended  Skin: Scattered purpura throughout feet, legs, abdomen, arms, and chest. Rash is non-blanchable and non-tender to palpation.   MSK: Able to move all extremities spontaneously and purposefully. Normal tone. Swelling and decreased motion throughout hands. No localized warmth or swelling of hand joints.  Neuropsych: Alert, oriented. Pleasant, calm, cooperative. Normal affect.     Data   Recent Labs   Lab 01/19/21  0627 01/18/21  1143 01/18/21  0656   WBC  --   --  7.0   HGB  --   --  9.2*   MCV  --   --  90   PLT  --   --  281   INR  --  1.15*  --      --  138   POTASSIUM 5.3  --  5.3   CHLORIDE 120*  --  115*   CO2 15*  --  13*   BUN 76*  --  85*   CR 2.20*  --  2.73*   ANIONGAP 7  --  10   JACKIE 9.0  --  8.8   *  --  87   ALBUMIN 2.1*  --  2.2*   PROTTOTAL  --   --  5.8*   BILITOTAL  --   --  0.4   ALKPHOS  --   --  83   ALT  --   --  14   AST  --   --  23

## 2021-01-19 NOTE — PROGRESS NOTES
Nephrology Progress Note  01/19/2021         Assessment & Recommendations:   Sara Ashton is a 80 year old with PMH COPD, pulmonary fibrosis, CKD3, HTN, HLD, who presented to OSH with diffuse skin rash. Developed L foot cellulitis which was treated with cephalexin and doxycycline from 1/1/2021. Finished course of antibiotics on 1/8. Subsequently developed purpuric rash and FTH SHANT prompting nephrology consultation    SHANT on CKD  Hematuria  Proteinuria, nephrotic range  Purpuric rash  Baseline Cr 1.2-1.3 until late 2019 and 1.4-1.5 more recently in Nov. Noted to be 3.26 at office visit on 1/17 and pt also developed purpuric rash. Tx to [facility] for specialist consultation. Cr has improved since admission to 2.73. LFTs are normal. Patient does have borderline eosinophilia on CBC with diff. Imaging reveals normal sized kidneys without gross abnormality. Complement levels, MPO/PR3 and serology for HCV/HBV/HIV are normal with further autoimmune serologies pending. Differential for her presentation includes drug reaction 2/2 recent antibiotic use and GN processes including IgA nephropathy/HSP although presence of proteinuria is confounded by her chronic NSAID use. Patient also has borderline hypercalcemia (Sharon Ca 10.2), normocytic anemia and should be evaluated for B cell dyscrasias. The fact that her Cr continues to improve since admission is reassuring and will defer initiation of immunosuppressive therapy for now while we await labs. A kidney biopsy is indicated to definitively diagnose the etiology of renal involvement and we did discuss this with patient who agreed. However, given continued improvement in Cr do not think there is urgency to do this and unclear if it would  if renal function continues to improve.  - Renal biopsy planned for Wednesday. Pt had been taking NSAIDs PTA and also received ASA 81 mg after admission. She notes she had not been taking ASA for a month prior though so believe it  "is safe to proceed after holding for about 48 hrs  - Please keep NPO starting at midnight  - Held PTA ASA and ACEi  - Hold PTA meloxicam  - Continue PTA hydrochlorothiazide  - Await JEFE, SHABNAM panel, cryoglobulin level  - SPEP/UPEP/FLC without evidence of monoclonal process  - Appreciate dermatology and rheumatology involvement     Recommendations were communicated to primary team verbally     Seen and discussed with Dr. Veronika Shin MD   [phone]    Interval History:  Nursing and provider notes from last 24 hours reviewed.  In the last 24 hours Sara Ashton remained clinically stable. Cr continues to improve. Rash stable. Denies any new symptoms    Review of Systems:   ROS neg as above    Physical Exam:   I/O last 3 completed shifts:  In: 995 [P.O.:995]  Out: 350 [Urine:350]   /57 (BP Location: Right arm)   Pulse 66   Temp 96.9  F (36.1  C) (Oral)   Resp 18   Ht 1.549 m (5' 1\")   Wt 84.2 kg (185 lb 11.2 oz)   SpO2 97%   BMI 35.09 kg/m       GENERAL APPEARANCE: no distress, awake  EYES: no scleral icterus, pupils equal  Pulmonary: lungs clear to auscultation with equal breath sounds bilaterally, no clubbing  CV: regular rhythm, normal rate, no rub   - Edema none  GI: soft, nontender, normal bowel sounds  MS: B/l hand swelling and chronic arthritic changes  SKIN: Diffuse rash involving whole body except face, palpable purpuric, non-tender  NEURO: face symmetric, AOx3, speech normal    Labs:   All labs reviewed by me  Electrolytes/Renal -   Recent Labs   Lab Test 01/19/21 0627 01/18/21  0656    138   POTASSIUM 5.3 5.3   CHLORIDE 120* 115*   CO2 15* 13*   BUN 76* 85*   CR 2.20* 2.73*   * 87   JACKIE 9.0 8.8   PHOS 4.9*  --        CBC -   Recent Labs   Lab Test 01/18/21  0656   WBC 7.0   HGB 9.2*          LFTs -   Recent Labs   Lab Test 01/19/21 0627 01/18/21  0656   ALKPHOS  --  83   BILITOTAL  --  0.4   ALT  --  14   AST  --  23   PROTTOTAL  --  5.8*   ALBUMIN 2.1* 2.2* "       Iron Panel - No lab results found.      Imaging:  All imaging studies reviewed by me.     Current Medications:    [Held by provider] aspirin  81 mg Oral Daily     calcium carbonate  1 tablet Oral Daily     fexofenadine  180 mg Oral Daily     FLUoxetine  20 mg Oral Daily     fluticasone  2 spray Both Nostrils Daily     guaiFENesin  600 mg Oral BID     heparin ANTICOAGULANT  5,000 Units Subcutaneous Q8H     hydrochlorothiazide  12.5 mg Oral Daily     [Held by provider] lisinopril-hydrochlorothiazide (ZESTORETIC) 20-12.5 mg combo dose   Oral Daily     omeprazole  20 mg Oral Daily     pregabalin  75 mg Oral Daily     sodium chloride (PF)  3 mL Intracatheter Q8H     traZODone  150 mg Oral At Bedtime     vitamin D3  50 mcg Oral Daily       Noreen Shin MD

## 2021-01-19 NOTE — PLAN OF CARE
Patient care [id number]  VSS. Denies pain and nausea. Rash on legs and lower trunk unchanged, not bothersome per pt. Derm consult today, awaiting biopsy results. Voiding spontaneously, had BM. Urine sample sent. New PIV placed. Up with SBA. Continue with plan of care.

## 2021-01-20 NOTE — PROGRESS NOTES
Nephrology Progress Note  01/20/2021         Assessment & Recommendations:   Sara Ashton is a 80 year old with PMH COPD, pulmonary fibrosis, CKD3, HTN, HLD, who presented to OSH with diffuse skin rash. Developed L foot cellulitis which was treated with cephalexin and doxycycline from 1/1/2021. Finished course of antibiotics on 1/8. Subsequently developed purpuric rash and FTH SHANT prompting nephrology consultation     SHANT on CKD  Hematuria  Proteinuria, nephrotic range  Purpuric rash  Baseline Cr 1.2-1.3 until late 2019 and 1.4-1.5 more recently in Nov. Noted to be 3.26 at office visit on 1/17 and pt also developed purpuric rash. Tx to [facility] for specialist consultation. Cr has improved since admission to 2.73. LFTs are normal. Patient does have borderline eosinophilia on CBC with diff. Imaging reveals normal sized kidneys without gross abnormality. Complement levels, MPO/PR3 and serology for HCV/HBV/HIV are normal with further autoimmune serologies pending. Differential for her presentation includes drug reaction 2/2 recent antibiotic use and GN processes including IgA nephropathy/HSP although presence of proteinuria is confounded by her chronic NSAID use. Patient also has borderline hypercalcemia (Sharon Ca 10.2), normocytic anemia and should be evaluated for B cell dyscrasias. The fact that her Cr continues to improve since admission is reassuring and will defer initiation of immunosuppressive therapy for now while we await labs.  - Unable to perform renal biopsy today. Difficult angle of access and with thinned cortex (thickness only ~0.9 cm on either side) the possibility of successfully extracting appropriate tissue was low and risk of complication higher. Will defer for now. Discussed with pt who agreed stating she would like to avoid procedure if possible  - Could resume PTA ASA as indicated  - Hold PTA meloxicam and ACEi  - Continue PTA hydrochlorothiazide  - SPEP/UPEP/FLC without evidence of monoclonal  "process  - Await skin biopsy results  - Appreciate dermatology and rheumatology involvement     Recommendations were communicated to primary team verbally     Seen and discussed with Dr. Veronika Shin MD   [phone]    Interval History :   Nursing and provider notes from last 24 hours reviewed.  In the last 24 hours Sara Ashton remained clinically stable. Cr stable. Denies any new concerns    Review of Systems:   ROS neg as above    Physical Exam:   I/O last 3 completed shifts:  In: 103 [P.O.:100; I.V.:3]  Out: 200 [Urine:200]   /64 (BP Location: Left arm)   Pulse 89   Temp 96.3  F (35.7  C) (Oral)   Resp 18   Ht 1.549 m (5' 1\")   Wt 84.2 kg (185 lb 11.2 oz)   SpO2 96%   BMI 35.09 kg/m       GENERAL APPEARANCE: no distress, awake  EYES: no scleral icterus, pupils equal  Pulmonary: lungs clear to auscultation with equal breath sounds bilaterally, no clubbing  CV: regular rhythm, normal rate, no rub   - Edema none  GI: soft, nontender, normal bowel sounds  MS: B/l hand swelling and chronic arthritic changes  SKIN: Diffuse rash involving whole body except face, palpable purpuric, non-tender  NEURO: face symmetric, AOx3, speech normal    Labs:   All labs reviewed by me  Electrolytes/Renal -   Recent Labs   Lab Test 01/20/21 0653 01/19/21 0627 01/18/21  0656    142 138   POTASSIUM 4.6 5.3 5.3   CHLORIDE 119* 120* 115*   CO2 15* 15* 13*   BUN 74* 76* 85*   CR 2.13* 2.20* 2.73*   GLC 97 100* 87   JACKIE 8.7 9.0 8.8   PHOS 4.3 4.9*  --        CBC -   Recent Labs   Lab Test 01/20/21  0653 01/18/21  0656   WBC 7.8 7.0   HGB 8.4* 9.2*    281       LFTs -   Recent Labs   Lab Test 01/20/21 0653 01/19/21  0627 01/18/21  0656   ALKPHOS  --   --  83   BILITOTAL  --   --  0.4   ALT  --   --  14   AST  --   --  23   PROTTOTAL  --   --  5.8*   ALBUMIN 2.2* 2.1* 2.2*       Iron Panel - No lab results found.      Imaging:  All imaging studies reviewed by me.     Current Medications:    [Held by " provider] aspirin  81 mg Oral Daily     calcium carbonate 500 mg (elemental)  1 tablet Oral Daily     fexofenadine  180 mg Oral Daily     FLUoxetine  20 mg Oral Daily     fluticasone  2 spray Both Nostrils Daily     guaiFENesin  600 mg Oral BID     [Held by provider] heparin ANTICOAGULANT  5,000 Units Subcutaneous Q8H     hydrochlorothiazide  12.5 mg Oral Daily     [Held by provider] lisinopril-hydrochlorothiazide (ZESTORETIC) 20-12.5 mg combo dose   Oral Daily     omeprazole  20 mg Oral Daily     pregabalin  75 mg Oral Daily     sodium chloride (PF)  3 mL Intracatheter Q8H     traZODone  150 mg Oral At Bedtime     vitamin D3  50 mcg Oral Daily       Noreen Shin MD

## 2021-01-20 NOTE — PROGRESS NOTES
[facility]   Progress Note - Marmalinda 5 Service        Date of Admission:  1/17/2021    Assessment & Plan       Sara Ashton is a 80 year old female admitted on 1/17/2021. She has a PMH of COPD, ILD, CKD3, HTN, and HLD who presented to OSH with 2 weeks of progressive skin rash and new onset hand swelling in the setting of recent left toe cellulitis and antibiotic use. Also with SHANT in the setting of progressively worsening kidney function. Overall picture most consistent vasculitis: IgA, etc. Hemodynamically stable, with volume status improved from yesterday. Awaiting renal biopsy, planned for today.    Changes Today:  - Renal biopsy planned with Nephrology     Palpable purpura  Likely vasculitis  Suspect small to medium sized vasculitis possibly provoked by recent cellulitis vs antibiotic use. Most likely Henoch Schonlein purpura. Less likely DIHS given lack of fever, hepatitis, or LAD. Unlikely malignancy given lack of weight loss or constitutional sxs, although she admits that she is overdue for colon and breast cancer screening. No treatment initiated at this time, awaiting diagnostics and Rheumatology recs. Complements normal, MPO & PR3 normal, JEFE borderline positive, ANCA negative, ASO and CCP normal, RF normal. Hep B and C normal, HIV neg. Protein studies show marked hypoalbuminemia with elevated globulins, suggesting glomerular damage and acute phase reaction. No e/o monoclonal process.   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 Cr increased from 1.5 to 3.26 over past 2 months. No e/o hypoperfusion or obstruction. Has been taking meloxicam regularly for RA, which  can cause renal injury. However, overall picture most c/w renal vasculitis. UA at OSH with proteinuria, WBCs + RBCs, and hyaline casts. Borderline nephrotic range proteinuria with Protein/Cr ratio of 3.2, albumin/Cr significantly elevated to 2816.42. Also has NAGMA with baseline bicarb ~20 in the recent months, now down to 13. Likely related to renal disease. No e/o diarrhea or adrenal insufficiency. Not on any meds that are known to cause acidosis.   - Nephrology consulted, plan for renal biopsy today     Left toe cellulitis  Diagnosed with cellulitis of 2nd digit of L foot on 1/1/21. Treated with a course of cephalexin and doxycycline, which she completed on 1/8/21. Planning to see Dr. José Luis Carrington of Podiatry for possible amputation/debridement on 1/21/21.   - Will need to reschedule outpatient f/u with Podiatry given current admission     COPD  ILD  Smoked 0.5 PPD for ~20 years, quit ~20 years ago. Previously followed with Pulm, last seen in 6/2019 per chart review. Picture was c/f mild underlying fibrosis and possible component of COPD with markedly decreased DLCO at 34% and relative preservation of lung volumes. Planned to f/u with repeat PFTs in 6 mo with possible repeat CT based on results. Currently only on rescue inhalers at home, no maintenance inhalers.  - Continue PTA albuterol PRN  - F/u with Pulm as outpatient     Diet: NPO for renal biopsy today, can resume regular diet after  Fluids: Resume PO fluid intake after renal biopsy today  Lines: PIV x1 RUE  DVT Prophylaxis: Heparin on hold for procedure today  Nunez Catheter: not present  Code Status: Full Code         Disposition Plan   Expected discharge: 1-2 days, recommended to prior living arrangement once adequate symptom control, tolerating PO medications, renal function near baseline, appropriate discharge plan in place.   Entered: Michelle Elder MD 01/20/2021, 3:42 PM     The patient's care was discussed with the Attending Physician,   Jaden.    Dinorah Lomeli, MS4  [address]   Please see sign in/sign out for up to date coverage information    Resident/Fellow Attestation   I, Michelle Eldre, was present with the medical student who participated in the service and in the documentation of the note.  I have verified the history and personally performed the physical exam and medical decision making.  I agree with the assessment and plan of care as documented in the note.      Possible renal biopsy today. Cr stable from yesterday.    Michelle Elder MD  PGY4  Date of Service (when I saw the patient): 01/20/21  ______________________________________________________________________    Interval History   Afebrile and VSS overnight. Continues to endorse congestion and sinus HA, improved with PRN meds. Tolerating regular diet, good appetite. Made NPO at midnight for possible renal biopsy today, had pre-op scrub this AM. Voiding spontaneously. Up with assist yesterday, experienced LUA but unchanged from baseline.    Data reviewed today: I reviewed all medications, new labs and imaging results over the last 24 hours. I personally reviewed no images or EKG's today.    Physical Exam   Vital Signs: Temp: 96.3  F (35.7  C) Temp src: Oral BP: 123/64 Pulse: 89   Resp: 18 SpO2: 96 % O2 Device: None (Room air)    Weight: 185 lbs 11.2 oz  Constitutional: Awake, sitting up in bedside chair. No acute distress.   HEENT: Normocephalic, atraumatic. Lids and lashes normal, EOMI, conjunctivae clear, sclera anicteric. Moist mucous membranes.  Neck: Supple.   Resp: Lungs CTAB, no increased WOB. No crackles or wheezing.   CV: RRR, normal S1 and S2, no S3 or S4. No murmurs, rubs, or gallops. 1-2+ pitting edema bilaterally; extending to mid-shins. No associated warmth or erythema.  Abd: Soft, non-tender, non-distended.  Skin: Scattered purpura throughout feet, legs, abdomen, arms, and chest. Rash is  non-blanchable and non-tender to palpation.   MSK: Able to move all extremities spontaneously and purposefully. Normal tone.  Neuropsych: Alert, oriented. Pleasant, calm, cooperative. Normal affect.     Data   Recent Labs   Lab 01/20/21  0950 01/20/21  0653 01/19/21  0627 01/18/21  1143 01/18/21  0656   WBC  --  7.8  --   --  7.0   HGB  --  8.4*  --   --  9.2*   MCV  --  88  --   --  90   PLT  --  292  --   --  281   INR 1.10  --   --  1.15*  --    NA  --  142 142  --  138   POTASSIUM  --  4.6 5.3  --  5.3   CHLORIDE  --  119* 120*  --  115*   CO2  --  15* 15*  --  13*   BUN  --  74* 76*  --  85*   CR  --  2.13* 2.20*  --  2.73*   ANIONGAP  --  8 7  --  10   JACKIE  --  8.7 9.0  --  8.8   GLC  --  97 100*  --  87   ALBUMIN  --  2.2* 2.1*  --  2.2*   PROTTOTAL  --   --   --   --  5.8*   BILITOTAL  --   --   --   --  0.4   ALKPHOS  --   --   --   --  83   ALT  --   --   --   --  14   AST  --   --   --   --  23

## 2021-01-20 NOTE — PLAN OF CARE
[id number] Red/purple, non-pruritic rash scattered on BUE, BLE, and trunk. Tylenol given x1 for headache. Denies nausea. PIV saline locked. Nasal congestion managed with ocean spray and Mucinex. Voiding spontaneously, not saving. Up with minimal assist. VSS on room air. NPO for renal biopsy today. Pre-op scrub completed x1 this morning.

## 2021-01-20 NOTE — DISCHARGE SUMMARY
[facility]   Discharge Summary - Medicine       Date of Admission:  1/17/2021  Date of Discharge:  01/22/2021  Discharging Provider: George Friedman MD  Discharge Service: [facility]    Discharge Diagnoses   Acute kidney failure on CKD3  Acute Vasculitis  Palpable purpura  Proteinuria, near nephrotic range  Non-anion gap metabolic acidosis  COPD  ILD    Follow-ups Needed After Discharge   -Nephrology clinic in 2 weeks with CBC, renal panel, UA, Ur Pr:Cr ratio beforehand    -Address restarting lisinopril/hctz  -Dermatology virtual visit in 1-2 weeks  -PCP annual exam for age appropriate preventive health screenings  -Pulmonology for PFTs for concern for interstitial lung disease  -Podiatry regarding left toe    Unresulted Labs Ordered in the Past 30 Days of this Admission     Date and Time Order Name Status Description    1/21/2021 0745 Cryoglobulin quantitative In process     1/21/2021 0657 Protein Immunofixation Serum In process     1/18/2021 1331 Dermatological path order and indications In process           Discharge Disposition   Discharged to home  Condition at discharge: Stable    Hospital Course   Sara Ashton was admitted on 1/17/2021 for rash on lower extremities and acute kidney injury.  The following problems were addressed during her hospitalization:    Palpable purpura  Likely vasculitis  Suspected small to medium sized vasculitis possibly provoked by recent cellulitis vs antibiotic use vs other autoimmune process. Less likely DIHS given lack of fever, hepatitis, or LAD. Unlikely malignancy given lack of weight loss or constitutional symptoms, although she admits that she is overdue for colon and breast cancer screening. Complements normal, MPO & PR3 normal, JEFE borderline positive, ANCA negative, ASO and CCP normal, RF normal. Hep B and C normal, HIV neg. Protein studies show marked hypoalbuminemia with elevated globulins, suggesting glomerular damage and acute  phase reaction. No e/o monoclonal process. Rheumatology and dermatology were consulted. 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 Started triamcinolone cream BID per derm recs to rash. She should follow up with dermatology with a virtual visit in 1-2 weeks.     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 Creatinine increased from 1.5 to 3.26 over past 2 months. No evidence of hypoperfusion or obstruction. Had been taking meloxicam regularly for arthritis, which could have contributed to renal injury. However, overall picture most c/w renal vasculitis. UA at OSH with proteinuria, WBCs + RBCs, and hyaline casts. Borderline nephrotic range proteinuria with Protein/Cr ratio of 3.2, albumin/Cr significantly elevated to 2816.42. Also has NAGMA with baseline bicarb ~20 in the recent months, now down to 13, improved to 15. Likely related to renal disease. No evidence of diarrhea or adrenal insufficiency. Not on any meds that are known to cause acidosis. Nephrology consulted, and attempted renal biopsy but was felt to be unsafe due to thin cortex. Creatinine improved prior to discharge and was 2.03 and bicarb 18. She was started on oral bicarb supplement and should follow up with nephrology in 2 weeks with labs beforehand.    Left toe cellulitis resolved  Diagnosed with cellulitis of 2nd digit of L foot on 1/1/21. Treated with a course of cephalexin and doxycycline, which she completed on 1/8/21. Planning to see Dr. José Luis Carrington of Podiatry for possible amputation/debridement on 1/21/21. Rescheduled outpatient f/u with Podiatry given current admission.     COPD  ILD  Smoked 0.5 PPD for ~20 years, quit ~20 years ago. Previously followed with Pulm, last seen in 6/2019 per chart review. Picture was c/f mild underlying fibrosis and  possible component of COPD with markedly decreased DLCO at 34% and relative preservation of lung volumes. Planned to f/u with repeat PFTs in 6 mo with possible repeat CT based on results. Currently only on rescue inhalers at home, no maintenance inhalers.  - Continue PTA albuterol PRN  - F/u with Pulm as outpatient        Consultations This Hospital Stay   RHEUMATOLOGY IP CONSULT  NEPHROLOGY GENERAL ADULT IP CONSULT  VASCULAR ACCESS CARE ADULT IP CONSULT  DERMATOLOGY IP CONSULT  VASCULAR ACCESS CARE ADULT IP CONSULT    Code Status   Full Code     The patient was discussed with Dr. George Friedman.    Michelle Elder MD  [address] [address]  Phone: [phone]  ______________________________________________________________________    Physical Exam   Vital Signs: Temp: 98.9  F (37.2  C) Temp src: Oral BP: 132/82 Pulse: 64   Resp: 17 SpO2: 97 % O2 Device: None (Room air)    Weight: 185 lbs 11.2 oz  Constitutional: Asleep, sitting up in bedside chair, easily awakened. No acute distress.   HEENT: Normocephalic, atraumatic. Lids and lashes normal, EOMI, conjunctivae clear, sclera anicteric. Moist mucous membranes.  Neck: Supple.   Resp: Lungs CTAB, no increased WOB. No crackles or wheezing.   CV: RRR, normal S1 and S2, no S3 or S4. No murmurs, rubs, or gallops. 2+ pitting edema bilaterally (L>R); extending to mid-shins. No associated warmth or erythema.  Abd: Soft, non-tender, non-distended.  Skin: Scattered purpura throughout feet, legs, abdomen, arms, and chest with well circumscribed annular purple plaques. Rash is non-blanchable and non-tender to palpation. Improved from prior exam. (Please see photos in media tab under Chart Review in  epic)  MSK: Able to move all extremities spontaneously and purposefully. Normal tone.  Neuropsych: Alert, oriented. Pleasant, calm, cooperative. Normal affect.         Primary Care Physician   Agustín RIZO  [place]    Discharge Orders      Renal panel     CBC with platelets    Last Lab Result: Hemoglobin (g/dL)       Date                     Value                 01/20/2021               8.4 (L)          ----------     Routine UA with microscopic     Protein timed urine with Creat Ratio    Lab to order UCRT once per sample.     DERMATOLOGY ADULT REFERRAL      Nephrology Adult Referral      Reason for your hospital stay    You were hospitalized for a rash and a kidney injury, caused by vasculitis or inflammation of your small blood vessels.     Activity    Your activity upon discharge: activity as tolerated     Adult [facility] Follow-up and recommended labs and tests    Follow up in nephrology clinic in 2 weeks with labs beforehand: renal panel, CBC, urinalysis, urine protein:creatinine ratio.\  Follow up with dermatology with a virtual visit (video visit via Open[employer] portal) in 1-2 weeks.     Appointments on [place] and/or [facility] (with [facility] or [facility] provider or service). Call [phone] if you haven't heard regarding these appointments within 7 days of discharge.     Diet    Follow this diet upon discharge: Regular diet       Significant Results and Procedures   Most Recent 3 CBC's:  Recent Labs   Lab Test 01/20/21  0653 01/18/21  0656   WBC 7.8 7.0   HGB 8.4* 9.2*   MCV 88 90    281     Most Recent 3 BMP's:  Recent Labs   Lab Test 01/22/21  0706 01/21/21  0657 01/20/21  0653    142 142   POTASSIUM 4.3 4.9 4.6   CHLORIDE 116* 116* 119*   CO2 14* 15* 15*   BUN 74* 69* 74*   CR 2.19* 2.03* 2.13*   ANIONGAP 9 11 8   JACKIE 8.9 9.1 8.7   * 96 97       Discharge Medications   Current Discharge Medication List      START taking these medications    Details   sodium bicarbonate 650 MG tablet Take 2 tablets (1,300 mg) by mouth 2 times daily  Qty: 120 tablet, Refills: 0    Associated Diagnoses: Metabolic acidosis      triamcinolone (KENALOG) 0.1 % external cream Apply topically 2 times daily  Qty: 450  g, Refills: 0    Associated Diagnoses: Rash         CONTINUE these medications which have CHANGED    Details   aspirin (ASA) 81 MG EC tablet Take 1 tablet (81 mg) by mouth daily  Qty:      Associated Diagnoses: Rash      pregabalin (LYRICA) 75 MG capsule Take 1 capsule (75 mg) by mouth daily    Associated Diagnoses: Pain         CONTINUE these medications which have NOT CHANGED    Details   acetaminophen (TYLENOL) 650 MG CR tablet Take 650 mg by mouth every 6 hours as needed      calcium carbonate 500 mg, elemental, (OSCAL 500) 1250 (500 Ca) MG TABS tablet Take 1 tablet by mouth daily      fexofenadine (ALLEGRA) 180 MG tablet Take 180 mg by mouth daily      FLUoxetine (PROZAC) 20 MG capsule Take 20 mg by mouth daily      fluticasone (FLONASE) 50 MCG/ACT nasal spray Spray 2 sprays into both nostrils daily       hypromellose (ARTIFICIAL TEARS) 0.5 % SOLN ophthalmic solution 1 drop 4 times daily as needed for dry eyes      NONFORMULARY Take 1 tablet by mouth daily Probiotic- unsure content.      omeprazole (PRILOSEC) 20 MG DR capsule Take 20 mg by mouth daily      oxybutynin ER (DITROPAN-XL) 5 MG 24 hr tablet Take 5 mg by mouth daily      traZODone (DESYREL) 150 MG tablet Take 150 mg by mouth At Bedtime      vitamin D3 (CHOLECALCIFEROL) 50 mcg (2000 units) tablet Take 1 tablet by mouth daily         STOP taking these medications       lisinopril-hydrochlorothiazide (ZESTORETIC) 20-12.5 MG tablet Comments:   Reason for Stopping:             Allergies   No Known Allergies     Internal Medicine Staff Addendum  Date of Service: 1/22/2021  I have seen and examined Ms. Ashton, reviewed the data and discussed the plan of care with the patient and the care team on P&FC Rounds.  I agree with the above documentation.  I discussed pt's care with bedside RN, case management/social work today.  I personally reviewed imaging, labs, medications and past 24 hr notes.    40 minutes spent in discharge, including >50% in counseling and  coordination of care, medication review and plan of care recommended on follow up. Questions were answered.   The patient's PCP was contacted electronically at the time of discharge, so as to bridge from hospital to outpatient care.   It was our pleasure to care for Ms. Ashton during her hospitalization. Please do not hesitate to contact me should there be questions regarding the hospital course or discharge plan.      George Friedman MD  Internal Medicine/Pediatrics Hospitalist & Staff Physician   of Internal Medicine and Pediatrics  [facility]  Pager: [phone]

## 2021-01-20 NOTE — PLAN OF CARE
Problem: Adult Inpatient Plan of Care  Goal: Plan of Care Review  Outcome: No Change     Temp: 98  F (36.7  C) Temp src: Oral BP: 133/51 Pulse: 89   Resp: 17 SpO2: 96 % O2 Device: None (Room air)       -denies pain  -tolerating Regular diet with good appetite and without nausea  -ambulated in the hallways with assist of 1  -had a BM today  -voiding not saving  -skin rash on BLE, abdomen, arms and back  -lungs diminished, dyspnea with activity    NPO at MN for kidney biopsy tomorrow.  Continue with plan of care.

## 2021-01-20 NOTE — PROGRESS NOTES
RHEUMATOLOGY PROGRESS NOTE - FELLOW     Sara Ashton MRN# [id number]   Age: 80 year old YOB: 1940     Date of Admission:  1/17/2021  Date of initial consult: 01/18/2021    Assessment and Plan:   80-year-old female with no prior history of rheumatological disorders presenting with purpuric rash, hematuria, nephrotic range proteinuria plus some degree of inflammatory arthritis in addition to OA, altogether concerning for vasculitis.   Labs so far notable for borderline positive JEFE (1: 80) and several negative tests namely; complements, ANCA, MPO, WI-3, SPEP, hepatitis B, hepatitis C, HIV, ASO, RF, CCP.  Pending labs include cryoglobulins, immunofixation, anti-dsDNA and SHABNAM panel.  Also awaiting results of skin biopsy.    1/20: Nephrology unable to perform biopsy today due to several unfavorable factors that would significantly decrease the chances of adequate sampling while increasing risks of complications.  In light of her improving renal function, nephrology plans to hold off on biopsy.  They will reconsider if renal function begins to worsen.  Per discussion with nephrology, there is no role in starting patient on corticosteroids at this time since her renal function is improving.    Problem list:  -Purpuric rash, concern for vasculitis  -SHANT on CKD with nephrotic range proteinuria    Recommendations:  --Follow-up remaining lab results and skin biopsy  --Holding off on corticosteroids at this time  --Rheumatology will continue to follow    The patient was staffed with Dr. Wolf.     Javon Leahy MD  Internal Medicine, PGY3  [phone]    Subjective/24 hour events:   No acute events overnight.  She has remained afebrile and hemodynamically stable, normal sats on room air.          Medications:     Current Facility-Administered Medications   Medication     acetaminophen (TYLENOL) tablet 650 mg     [Held by provider] aspirin EC tablet 81 mg     calcium carbonate 500 mg (elemental) (OSCAL) tablet  "500 mg     fexofenadine (ALLEGRA) tablet 180 mg     FLUoxetine (PROzac) capsule 20 mg     fluticasone (FLONASE) 50 MCG/ACT spray 2 spray     guaiFENesin (MUCINEX) 12 hr tablet 600 mg     guaiFENesin (MUCINEX) 12 hr tablet 600 mg     [Held by provider] heparin ANTICOAGULANT injection 5,000 Units     hydrochlorothiazide (HYDRODIURIL) tablet 12.5 mg     hypromellose (ARTIFICIAL TEARS) 0.5 % ophthalmic solution 1 drop     lidocaine (LMX4) cream     lidocaine 1 % 0.1-1 mL     [Held by provider] lisinopril-hydrochlorothiazide (ZESTORETIC) 20-12.5 mg combo dose     melatonin tablet 1 mg     omeprazole (priLOSEC) CR capsule 20 mg     ondansetron (ZOFRAN-ODT) ODT tab 4 mg    Or     ondansetron (ZOFRAN) injection 4 mg     polyethylene glycol (MIRALAX) Packet 17 g     pregabalin (LYRICA) capsule 75 mg     senna-docusate (SENOKOT-S/PERICOLACE) 8.6-50 MG per tablet 1 tablet    Or     senna-docusate (SENOKOT-S/PERICOLACE) 8.6-50 MG per tablet 2 tablet     sodium chloride (OCEAN) 0.65 % nasal spray 1 spray     sodium chloride (PF) 0.9% PF flush 3 mL     sodium chloride (PF) 0.9% PF flush 3 mL     traZODone (DESYREL) tablet 150 mg     traZODone (DESYREL) tablet 150 mg     Vitamin D3 (CHOLECALCIFEROL) tablet 50 mcg               Physical Exam:   /44 (BP Location: Left arm)   Pulse 65   Temp 97.6  F (36.4  C) (Oral)   Resp 16   Ht 1.549 m (5' 1\")   Wt 84.2 kg (185 lb 11.2 oz)   SpO2 97%   BMI 35.09 kg/m      Not performed          Data:   Labs and imaging reviewed.      "

## 2021-01-20 NOTE — PROGRESS NOTES
[facility] Inpatient Dermatology Brief Update Note     Date of Admission: 1/17/2021  Encounter Date: 01/20/21     Preliminary Skin Biopsy Results:  Findings suggestive of early leukocytoclastic vasculitis which would be consistent with cutaneous small vessel vasculitis and like IgA vasculitis, but unable to totally exclude linear IgA bullous dermatosis which would be evident on DIF, which is pending. Final dermatopathology report pending.  Both IgA vasculitis and linear IgA can lead to IgA nephropathy and there have been rare reports of both processes occurring together as a result of a shared antigenic target in both the cutaneous and vascular basement membrane zones. There is significant overlap in the management of both disorders.     Thank you for the dermatology consultation. Please contact with any additional questions or concerns.     Marcella Arriaga MD  Dermatology Resident  [facility]    Plan of care discussed with me.      Danny Roberto MD  Dermatology Attending

## 2021-01-20 NOTE — PROCEDURES
Kidney  Biopsy Procedure Note    Renal biopsy cancelled   Reason : Very thin cortex , not amenable to biopsy .   Staffed with Dr Safia Lopez MD, FACP  Nephrology Fellow   [facility]   Pager [phone]

## 2021-01-20 NOTE — PLAN OF CARE
Went to Ultrasound for Renal biopsy at 1:45pm. A & O x4. VSS. Has skin rash on BLE, abdomen, arms and back. Denies itching or pain. Voiding not saving. Sat up in the chair most of the day. PIV SL    Unable to do renal ultrasound because cortex kidney was too thin/small per Nephrology MD

## 2021-01-21 PROBLEM — N17.9 ACUTE KIDNEY FAILURE (H): Status: ACTIVE | Noted: 2021-01-01

## 2021-01-21 PROBLEM — I77.6 VASCULITIS (H): Status: ACTIVE | Noted: 2021-01-01

## 2021-01-21 NOTE — PROGRESS NOTES
[facility] Inpatient Dermatology Brief Update Note     Date of Admission: 1/17/2021  Encounter Date: 01/21/21     Interval History/Exam:  - skin eruption improving with triamcinolone  - unable to obtain renal biopsy due to positioning and thin cortex  - patient unable to sleep, feels exhausted and would like to go home         Assessment/Recommendations:    1. Cutaneous small vessel vasculitis  - although DIF negative, may be false negative and still suspect IgA vasculitis given skin biopsy results and evidence of IgA nephropathy, biopsy with evidence of eosinophils accompanying neutrophils which maybe suggestive of a drug-induced process like 2/2 doxycycline or cephalexin; remainder of the workup is negative  - although systemic steroids can be considered, some studies show limited benefit in the setting of IgA vasculitis/nephropathy; would defer to rheumatology for further systemic management as skin is improving with no further evidence of hemorrhagic bullae formation or ulceration  - although additional biopsies for repeat DIF may help confirm a diagnosis of IgA vasculitis, we would recommend holding off for now as it likely would not  or follow-up plan  - will arrange for virtual follow-up (patient's son to help with photographs) within 2 weeks to confirm continuous improvement of cutaneous lesions  - continue triamcinolone 0.1% ointment BID to inflamed red areas  - recommend outpatient nephrology follow up as planned     Thank you for the dermatology consultation. Please contact with additional questions.     Patient was discussed but not seen with attending physician, Dr. Roberto. Resident saw patient today; attending reviewed photos available in Epic.     Marcella Arriaga MD  Dermatology Resident  [facility]    I  reviewed all available images and relevant chart information, and agree with the assessment and plan as documented in the resident's note.    Danny Roberto,  MD  Dermatology Attending

## 2021-01-21 NOTE — PROGRESS NOTES
[facility]   Progress Note - Jocelynn 5 Service        Date of Admission:  1/17/2021    Assessment & Plan       Sara Ashton is a 80 year old female admitted on 1/17/2021. She has a PMH of COPD, ILD, CKD3, HTN, and HLD who presented to OSH with 2 weeks of progressive skin rash and new onset hand swelling in the setting of recent left toe cellulitis and antibiotic use. Also with SHANT in the setting of progressively worsening kidney function. Overall picture most c/w leukocytoclastic vasculitis, etiology unclear at this time (infection, medication/antibiotic, IgA).     Changes Today:  - Coordinate and Establish follow up +/- treatment plan with dermatology and rheumatology  - monitor rash  - monitor kidney function     Palpable purpura  Leukocytoclastic vasculitis  Suspect small to medium sized vasculitis possibly provoked by recent cellulitis vs antibiotic use. 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 Less likely DIHS given lack of fever, hepatitis, or LAD. Unlikely malignancy given lack of weight loss or constitutional sxs. Complements normal, MPO & PR3 normal, JEFE borderline positive, ANCA negative, ASO and CCP normal, RF normal, G6PD negative. Hep B and C normal, HIV neg. Protein studies show marked hypoalbuminemia with elevated globulins, suggesting glomerular damage and acute phase reaction. No e/o monoclonal process. Skin biopsy showed leukocytoclastic vasculitis, DIF was negative suggesting against an immunobullous disease. Steroid cream seems to be helping with rash. Otherwise, awaiting Rheum recs for ongoing management.  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 Cr increased from 1.5 to 3.26 over past 2 months. No e/o hypoperfusion or obstruction. Has been  taking meloxicam regularly for RA, which can cause renal injury. However, overall picture most c/w renal vasculitis. UA at OSH with proteinuria, WBCs + RBCs, and hyaline casts. Borderline nephrotic range proteinuria with Protein/Cr ratio of 3.2, albumin/Cr significantly elevated to 2816.42. Also has NAGMA with baseline bicarb ~20 in the recent months, now down to 13. Likely related to renal disease. No e/o diarrhea or adrenal insufficiency. Not on any meds that are known to cause acidosis. Cr continues to slowly down-trend while inpatient.  - Nephrology consulted, appreciate recs     Left toe cellulitis  Diagnosed with cellulitis of 2nd digit of L foot on 1/1/21. Treated with a course of cephalexin and doxycycline, which she completed on 1/8/21. Planning to see Dr. José Luis Carrington of Podiatry for possible amputation/debridement on 1/21/21, cancelled by daughter.   - Outpatient f/u with Podiatry, patient to reschedule     COPD  ILD  Smoked 0.5 PPD for ~20 years, quit ~20 years ago. Previously followed with Pulm, last seen in 6/2019 per chart review. Picture was c/f mild underlying fibrosis and possible component of COPD with markedly decreased DLCO at 34% and relative preservation of lung volumes. Planned to f/u with repeat PFTs in 6 mo with possible repeat CT based on results. Currently only on rescue inhalers at home, no maintenance inhalers.  - Continue PTA albuterol PRN  - F/u with Pulm as outpatient     Diet: Combination Diet Regular Diet Adult; 3 gm K Diet    Fluids: PO  Lines: PIV x1 LUE  DVT Prophylaxis: Heparin SQ, ambulatory, SCD, elevate legs at night  Nunez Catheter: not present  Code Status: Full Code      Disposition Plan   Expected discharge: Today, recommended to prior living arrangement once adequate symptom control, tolerating PO medications, renal function near baseline, appropriate discharge plan in place.  Entered: Dinorah Lomeli 01/21/2021, 10:13 AM     The patient's care was discussed with the  Attending Physician, Dr. Friedman.    Dinorah Lomeli, MS4  [facility]   Please see sign in/sign out for up to date coverage information      Resident/Fellow Attestation   I, Michelle Elder, was present with the medical/MARQUISE student who participated in the service and in the documentation of the note.  I have verified the history and personally performed the physical exam and medical decision making.  I agree with the assessment and plan of care as documented in the note.      Leukocytoclastic vasculitis on pathology. Small vessel vasculitis, still not sure of etiology (infection triggered vs antibiotic vs IgA)    Michelle Elder MD  PGY4  Date of Service (when I saw the patient): 01/21/21  ______________________________________________________________________    Interval History   Afebrile, ongoing HTN to 160s but otherwise VSS. Continues to have nasal congestion, improved with PRNs. Has been applying Kenalog cream to rash, which has seemed to help. Tolerating regular diet, no N/V. Voiding spontaneously. Slept well last night.     Data reviewed today: I reviewed all medications, new labs and imaging results over the last 24 hours. I personally reviewed no images or EKG's today.    Physical Exam   Vital Signs: Temp: 96.5  F (35.8  C) Temp src: Oral BP: 133/53 Pulse: 83   Resp: 18 SpO2: 96 % O2 Device: None (Room air)    Weight: 185 lbs 11.2 oz  Constitutional: Asleep, sitting up in bedside chair, easily awakened. No acute distress.   HEENT: Normocephalic, atraumatic. Lids and lashes normal, EOMI, conjunctivae clear, sclera anicteric. Moist mucous membranes.  Neck: Supple.   Resp: Lungs CTAB, no increased WOB. No crackles or wheezing.   CV: RRR, normal S1 and S2, no S3 or S4. No murmurs, rubs, or gallops. 2+ pitting edema bilaterally (L>R); extending to mid-shins. No associated warmth or erythema.  Abd: Soft, non-tender, non-distended.  Skin: Scattered  purpura throughout feet, legs, abdomen, arms, and chest. Rash is non-blanchable and non-tender to palpation. Improved from prior exam.   MSK: Able to move all extremities spontaneously and purposefully. Normal tone.  Neuropsych: Alert, oriented. Pleasant, calm, cooperative. Normal affect.     Data   Recent Labs   Lab 01/21/21  0657 01/20/21  0950 01/20/21  0653 01/19/21  0627 01/18/21  1143 01/18/21  0656   WBC  --   --  7.8  --   --  7.0   HGB  --   --  8.4*  --   --  9.2*   MCV  --   --  88  --   --  90   PLT  --   --  292  --   --  281   INR  --  1.10  --   --  1.15*  --      --  142 142  --  138   POTASSIUM 4.9  --  4.6 5.3  --  5.3   CHLORIDE 116*  --  119* 120*  --  115*   CO2 15*  --  15* 15*  --  13*   BUN 69*  --  74* 76*  --  85*   CR 2.03*  --  2.13* 2.20*  --  2.73*   ANIONGAP 11  --  8 7  --  10   JACKIE 9.1  --  8.7 9.0  --  8.8   GLC 96  --  97 100*  --  87   ALBUMIN 2.3*  --  2.2* 2.1*  --  2.2*   PROTTOTAL  --   --   --   --   --  5.8*   BILITOTAL  --   --   --   --   --  0.4   ALKPHOS  --   --   --   --   --  83   ALT  --   --   --   --   --  14   AST  --   --   --   --   --  23     Internal Medicine Staff Addendum  Date of Service: 1/21/2021  I have seen and examined Ms. Ashton, reviewed the data and discussed the plan of care with the patient and the care team on P&FC Rounds.  I agree with the above documentation     I discussed pt's care with bedside RN, case management/social work today.  I personally reviewed labs, medications and past 24 hr notes.  Assessment/Plan/Diagnoses: plan/dx as above, which contains my edits and reflects our joint medical decision-making.     George Friedman MD  Internal Medicine/Pediatrics Hospitalist & Staff Physician   of Internal Medicine and Pediatrics  [facility]  Pager: [phone]

## 2021-01-21 NOTE — PROGRESS NOTES
RHEUMATOLOGY PROGRESS NOTE - FELLOW     Sara Ashton MRN# [id number]   Age: 80 year old YOB: 1940     Date of Admission:  1/17/2021  Date of initial consult: 01/18/2021    Assessment and Plan:   80-year-old female with no prior history of rheumatological disorders presenting with purpuric rash, hematuria, nephrotic range proteinuria plus some degree of inflammatory arthritis in addition to OA, altogether concerning for vasculitis. Skin biopsy consistent with leukocytoclastic vasculitis, immunofixation negative for immunoreactant deposition. Nephrology unable to perform renal biopsy due to thin cortex and issues with positioning. Labs notable for borderline positive JEFE (1: 80) and several negative tests namely; complements, ANCA, MPO, OK-3, SPEP, hepatitis B, hepatitis C, HIV, ASO, RF, CCP, SHABNAM panel and anti-dsDNA. Serum cryoglobulins pending.    Leukocytoclastic vasculitis, limited cutaneous +/- renal involvement   SHANT on CKD with nephrotic range proteinuria  Patient's disease appears to be limited to skin and possibly kidneys. Rash is improving with topical treatment and renal function continues to improve without corticosteroid treatment or immunosuppression. Discussed case with nephrology, no role in starting patient on corticosteroids at this time since her renal function is improving. She will follow up with nephrology as outpatient. Since patient's rheumatological workup has been negative so fare (besides a borderline positive JEFE), vasculitis is unlikely to be secondary to an undiagnosed rheumatological disorder. No indication for immunosuppression or further rheumatology follow up at this time.     Rheumatology will sign off. Thank you for involving us in the care of this patient.     The patient was staffed with Dr. Wolf.     Javon Leahy MD  Internal Medicine, PGY3  [phone]    Subjective/24 hour events:   No acute events overnight.  She has remained afebrile and hemodynamically  "stable, normal sats on room air.          Medications:     Current Facility-Administered Medications   Medication     acetaminophen (TYLENOL) tablet 650 mg     aspirin EC tablet 81 mg     calcium carbonate 500 mg (elemental) (OSCAL) tablet 500 mg     fexofenadine (ALLEGRA) tablet 180 mg     FLUoxetine (PROzac) capsule 20 mg     fluticasone (FLONASE) 50 MCG/ACT spray 2 spray     guaiFENesin (MUCINEX) 12 hr tablet 600 mg     guaiFENesin (MUCINEX) 12 hr tablet 600 mg     [Held by provider] heparin ANTICOAGULANT injection 5,000 Units     hypromellose (ARTIFICIAL TEARS) 0.5 % ophthalmic solution 1 drop     lidocaine (LMX4) cream     lidocaine 1 % 0.1-1 mL     lisinopril-hydrochlorothiazide (ZESTORETIC) 20-12.5 mg combo dose     melatonin tablet 1 mg     omeprazole (priLOSEC) CR capsule 20 mg     ondansetron (ZOFRAN-ODT) ODT tab 4 mg    Or     ondansetron (ZOFRAN) injection 4 mg     polyethylene glycol (MIRALAX) Packet 17 g     pregabalin (LYRICA) capsule 75 mg     senna-docusate (SENOKOT-S/PERICOLACE) 8.6-50 MG per tablet 1 tablet    Or     senna-docusate (SENOKOT-S/PERICOLACE) 8.6-50 MG per tablet 2 tablet     sodium bicarbonate tablet 1,300 mg     sodium chloride (OCEAN) 0.65 % nasal spray 1 spray     sodium chloride (PF) 0.9% PF flush 3 mL     sodium chloride (PF) 0.9% PF flush 3 mL     traZODone (DESYREL) tablet 150 mg     traZODone (DESYREL) tablet 150 mg     triamcinolone (KENALOG) 0.1 % cream     Vitamin D3 (CHOLECALCIFEROL) tablet 50 mcg               Physical Exam:   /59   Pulse 96   Temp 97.3  F (36.3  C) (Oral)   Resp 17   Ht 1.549 m (5' 1\")   Wt 84.2 kg (185 lb 11.2 oz)   SpO2 95%   BMI 35.09 kg/m      Not performed          Data:   Labs and imaging reviewed.      "

## 2021-01-21 NOTE — PROGRESS NOTES
Nephrology Progress Note  01/21/2021         Assessment & Recommendations:   Sara Ashton is a 80 year old with PMH COPD, pulmonary fibrosis, CKD3, HTN, HLD, who presented to OSH with diffuse skin rash. Developed L foot cellulitis which was treated with cephalexin and doxycycline from 1/1/2021. Finished course of antibiotics on 1/8. Subsequently developed purpuric rash and FTH SHANT prompting nephrology consultation     SHANT on CKD  Hematuria  Proteinuria, nephrotic range  Purpuric rash  Baseline Cr 1.2-1.3 until late 2019 and 1.4-1.5 more recently in Nov. Noted to be 3.26 at office visit on 1/17 and pt also developed purpuric rash. Tx to [facility] for specialist consultation. Cr has improved since admission to 2.73. LFTs are normal. Patient does have borderline eosinophilia on CBC with diff. Imaging reveals normal sized kidneys without gross abnormality. Complement levels, MPO/PR3 and serology for HCV/HBV/HIV are normal. SPEP/UPEP/FLC without evidence of monoclonal process. SHABNAM panel, RF and anti-CCP all neg. Skin biopsy revealed evidence of LCV without pos immunofluorescence for IgA or C3. Differential for her presentation includes drug reaction 2/2 recent antibiotic use and a GN process although presence of proteinuria is confounded by her chronic NSAID use.  - Unable to perform renal biopsy 1/20. Difficult angle of access and with thinned cortex (thickness only ~0.9 cm on either side) the possibility of successfully extracting appropriate tissue was low and risk of complication higher. Will defer for now. Discussed with pt who agreed stating she would like to avoid procedure if possible  - Would discuss with dermatology utility of repeating skin biopsy as this can be neg even with HSP/IgA nephropathy, but if repeat returns pos this would lend confidence towards a diagnosis of HSP  - Cryoglobulin levels pending and this remains a possibility however based on improving Cr trend will defer immunosuppression for now from  "a kidney standpoint and will have patient follow-up closely in clinic. For other organ involvement if immunosuppression is indicated will defer regimen to rheumatology and dermatology.  - Please have pt follow-up in Nephrology clinic within 2 weeks of discharge with repeat labs (renal panel, CBC, urinalysis and urine protein/Cr). She expressed preference for establishing care in the [facility] as that would be closer for her but if unable to arrange this may be easiest to just have pt follow-up here at least initially.  - Restart PTA lisinopril to reduce proteinuria and control BP  - Agree with PO bicarb replacement, goal >22  - Suggest decreasing lyrica to 50 mg daily to adjust for eGFR, more retention of this in the setting of SHANT may also be contributing towards edema  - Could resume PTA ASA as indicated  - Avoid NSAIDs  - Continue PTA hydrochlorothiazide  - Appreciate dermatology and rheumatology involvement     Recommendations were communicated to primary team verbally     Seen and discussed with Dr. Veronika Shin MD   [phone]    Interval History:  Nursing and provider notes from last 24 hours reviewed.  In the last 24 hours Sara Ashton remained clinically stable. BP noted to be higher. Cr continues to improve. Denies any new symptoms    Review of Systems:  ROS neg    Physical Exam:  No intake/output data recorded.   /53 (BP Location: Right arm)   Pulse 83   Temp 96.5  F (35.8  C) (Oral)   Resp 18   Ht 1.549 m (5' 1\")   Wt 84.2 kg (185 lb 11.2 oz)   SpO2 96%   BMI 35.09 kg/m       GENERAL APPEARANCE: no distress, awake  EYES: no scleral icterus, pupils equal  Pulmonary: breathing non-labored  CV: regular rhythm, normal rate   - Edema trace  GI: soft, nontender, nondistended  MS: B/l hand swelling and chronic arthritic changes  SKIN: Diffuse rash involving whole body except face, palpable purpuric, non-tender  NEURO: face symmetric, AOx3, speech normal    Labs:   All labs " reviewed by me  Electrolytes/Renal -   Recent Labs   Lab Test 01/21/21  0657 01/20/21  0653 01/19/21 0627    142 142   POTASSIUM 4.9 4.6 5.3   CHLORIDE 116* 119* 120*   CO2 15* 15* 15*   BUN 69* 74* 76*   CR 2.03* 2.13* 2.20*   GLC 96 97 100*   JACKIE 9.1 8.7 9.0   PHOS 4.6* 4.3 4.9*       CBC -   Recent Labs   Lab Test 01/20/21  0653 01/18/21  0656   WBC 7.8 7.0   HGB 8.4* 9.2*    281       LFTs -   Recent Labs   Lab Test 01/21/21  0657 01/20/21  0653 01/19/21 0627 01/18/21  0656   ALKPHOS  --   --   --  83   BILITOTAL  --   --   --  0.4   ALT  --   --   --  14   AST  --   --   --  23   PROTTOTAL  --   --   --  5.8*   ALBUMIN 2.3* 2.2* 2.1* 2.2*       Iron Panel - No lab results found.      Imaging:  All imaging studies reviewed by me.     Current Medications:    [Held by provider] aspirin  81 mg Oral Daily     calcium carbonate 500 mg (elemental)  1 tablet Oral Daily     fexofenadine  180 mg Oral Daily     FLUoxetine  20 mg Oral Daily     fluticasone  2 spray Both Nostrils Daily     guaiFENesin  600 mg Oral BID     [Held by provider] heparin ANTICOAGULANT  5,000 Units Subcutaneous Q8H     hydrochlorothiazide  12.5 mg Oral Daily     [Held by provider] lisinopril-hydrochlorothiazide (ZESTORETIC) 20-12.5 mg combo dose   Oral Daily     omeprazole  20 mg Oral Daily     pregabalin  75 mg Oral Daily     sodium bicarbonate  1,300 mg Oral BID     sodium chloride (PF)  3 mL Intracatheter Q8H     traZODone  150 mg Oral At Bedtime     triamcinolone   Topical BID     vitamin D3  50 mcg Oral Daily       Noreen Shin MD

## 2021-01-21 NOTE — PLAN OF CARE
Status Note (2698-3461):    AVSS on room air. A+Ox4. Slept intermittently throughout the night. Ambulating SBA in room/hallways. Renal puncture site pain managed with PRN tylenol. PRN nasal spray & mucinex utilized for chronic sinus congestion. Kenalog cream started for generalized rash, some improvement already noted. Tolerating low potassium/regular diet w/o nausea or vomiting. Education and handout given on good dietary choices. Passing gas, LBM 1/19. Voiding spontaneously in adequate amts. Left PIV SL. Will continue with POC.

## 2021-01-21 NOTE — RESULT ENCOUNTER NOTE
Results reviewed, consistent with cutaneous small vessel vasculitis, DIF negative although may represent false negative which can be seen in early disease, presence of eosinophils could suggest drug-induced LCV as suspected    Marcella Arriaga MD  Dermatology Resident  [facility]

## 2021-01-21 NOTE — PLAN OF CARE
VSS. A&O x4. C/O back pain. Gave tylenol x1. Up to bathroom with 1 assist and a walker due to weakness. Legs, abdomen, arms and back rash is improving. Tolerating diet. No c/o nausea. Voiding adequate amounts. No BM. PIV SL

## 2021-01-22 NOTE — PLAN OF CARE
Patient is alert, oriented x 4, denies pain, no nausea. PIV in place. Tolerating renal diet, pt ate 80% of her dinner meal. Pt is voiding spontaneously  in BR, reports positive flatus and +BM. Pt has generalized rash all over the body. Stand by assist of 1 and walker. Plan is to discharge home tomorrow.

## 2021-01-22 NOTE — PROGRESS NOTES
Discussed with primary team. Cr a little higher today, but likely within pt's new baseline of 2-2.2 as grossly stable over past 3 days. BP has been well controlled on hydrochlorothiazide alone since admission and PTA lisinopril has been held.  Given possible Cr rise will recommend continuing to hold ACEi until pt seen in clinic for follow-up within two weeks. If Cr remains stable consideration can be given to resuming lisinopril at that point.    Noreen Shin MD  Renal Fellow  [phone]

## 2021-01-22 NOTE — PLAN OF CARE
VSS. Tylenol given for back pain, Mucinex for congestion. Tolerating diet with no nausea. Kenalog cream to rash 2x daily. PT voiding spontaneously, had BM yesterday. Up with SBA. Possible discharge home today.

## 2021-01-22 NOTE — DISCHARGE INSTRUCTIONS
Use the steroid cream triamcinolone on the rash twice per day until it resolves.   If the rash isn't improving, the dermatologist might discuss starting steroid pills at your follow up virtual visit.  Since your kidneys were affected by this, they're having a hard time excreting acid right now, so we recommend you take the bicarbonate pills twice per day until you follow up with the kidney doctors (nephrologists). If your blood work looks better, you may be able to stop those pills.    Follow up with a primary care doctor for an annual exam for age appropriate screenings (colon cancer, breast cancer, osteoporosis, etc.)!

## 2021-01-22 NOTE — PLAN OF CARE
Pt discharged to home with transportation provided by son. Medications picked up from discharge pharmacy. PIV removed. Belongings sent home with pt. Discharge paperwork signed and discussed.

## 2021-01-22 NOTE — TELEPHONE ENCOUNTER
[facility] Call Center    Phone Message    May a detailed message be left on voicemail: yes     Reason for Call: Other: Pts discharge instructions state to follow up with a virtual appointment in dermatology within 1-2 weeks, thanks! Also there is a referral in pts chart for rash and vasculitis.    Action Taken: Message routed to:  [facility] Dermatology    Travel Screening: Not Applicable

## 2021-01-25 NOTE — PROGRESS NOTES
[facility]: Post-Discharge Note  SITUATION                                                      Admission:    Admission Date: 01/17/21   Reason for Admission: Acute kidney failure on CKD3  Discharge:   Discharge Date: 01/22/21  Discharge Diagnosis: Acute kidney failure on CKD3    BACKGROUND                                                      Sara Ashton was admitted on 1/17/2021 for rash on lower extremities and acute kidney injury.  The following problems were addressed during her hospitalization:    ASSESSMENT      Discharge Assessment  Patient reports symptoms are: Improved  Does the patient have all of their medications?: Yes  Does patient know what their new medications are for?: Yes  Does patient have a follow-up appointment scheduled?: Yes  Does patient have any other questions or concerns?: No    Post-op  Did the patient have surgery or a procedure: No  Fever: No  Chills: No  Eating & Drinking: eating and drinking without complaints/concerns  Bowel Function: normal  Urinary Status: voiding without complaint/concerns    PLAN                                                      Outpatient Plan:  -Nephrology clinic in 2 weeks with CBC, renal panel, UA, Ur Pr:Cr ratio beforehand    -Address restarting lisinopril/hctz  -Dermatology virtual visit in 1-2 weeks  -PCP annual exam for age appropriate preventive health screenings  -Pulmonology for PFTs for concern for interstitial lung disease  -Podiatry regarding left toe    Future Appointments   Date Time Provider Department Center   2/1/2021 10:00 AM  LAB [facility]   2/3/2021  2:30 PM Ladonna Carolina NP [facility]   2/4/2021  8:30 AM Dev Cameron MD [facility]           Lynn Sunshine

## 2021-02-03 NOTE — LETTER
"2/3/2021       RE: Sara Ashton  [address]  [facility] [address]     Dear Colleague,    Thank you for referring your patient, Sara Ashton, to the [facility] [place] at [facility]. Please see a copy of my visit note below.    Chief Complaint   Patient presents with     Follow Up     F/U HOSPITAL     Height 1.549 m (5' 1\"), weight 78.9 kg (174 lb).    .Sara is a 80 year old who is being evaluated via a billable telephone visit.      What phone number would you like to be contacted at? [phone]  How would you like to obtain your AVS? Mail a copy   Duration of call: 25 min  Phone call duration:  Minutes.Karmen Sams CMA       Nephrology telephone Visit 2/3/21    Assessment and Plan:    1. SHANT - Resolving. Creat 2.0, baseline 1.4-1.5. No voiding concerns. We did not obtain a UPCR. Admission UPCR was 3.2 g/gCr on 1/18/21   - Etiology of her SHANT undetermined. Attempted renal bx but unable given angle/thin cortex   - Given ongoing improvement will just continue to monitor   - Per discharge summary patient was taking Meloxicam but patient denies using? She is not using Ibuprofen and will only use Tylenol in the future. She denies pain    2. CKD3 - Baseline creat 1.4-1.5.    - She has intermit proteinuria on UA but not consistent   - Etiology of her CKD likely HTN   - Does not monitor blood pressures at home    3. HTN - Unknown control. Zestoretic on hold given SHANT. Has chronic edema w/o dyspnea. Was running 120-140/ during hospital admission  - Recommended home monitoring. Would not restart at this time    4. Electrolytes - No acute concerns. K 4.8, Na 142    5. Acid base - Ongoing metabolic acidosis, bicarb 15   - No diarrhea   - Taking bicarb 1300 mg bid   - Increase to TID    6. BMD - Ca 8.9, Phos 4.5, albumin 2.2   - Taking Oscal daily and Vit D 2000 unit(s) every day   - Will check Vit d and PTH    7. Anemia - Hgb 9.1. Baseline 10-11   " "- Check iron studies next visit    8. Disposition - RTC one month for f/u with labs prior    Assessment and plan was discussed with patient and she voiced her understanding and agreement.      Reason for Visit:  Post hospital SHANT/CKD follow up    HPI:  Ms Ashton is a 81 yo female with COPD, Pulmonary fibrosis, CKD3, HTN, HLD with recent hospital admission 1/17-1/22/21 with SHANT, Acute vasculitis. Peak creat 2.7. Baseline creat 1.4-1.5. Discharge creat 2.1.   Per Dr Vazquez's discharge summary:   \"Suspected small to medium sized vasculitis possibly provoked by recent cellulitis vs antibiotic use vs other autoimmune process. Less likely DIHS given lack of fever, hepatitis, or LAD. Unlikely malignancy given lack of weight loss or constitutional symptoms, although she admits that she is overdue for colon and breast cancer screening. Complements normal, MPO & PR3 normal, JEFE borderline positive, ANCA negative, ASO and CCP normal, RF normal. Hep B and C normal, HIV neg. Protein studies show marked hypoalbuminemia with elevated globulins, suggesting glomerular damage and acute phase reaction. No e/o monoclonal process. Rheumatology and dermatology were consulted. 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 Started triamcinolone cream BID per derm recs to rash.\" Has f/u with Derm this week.   Patient notes rash is better, drying up.     ROS:   No complaints other than fatigue  Denies CP, dyspnea  Rash is drying up  Stable LE edema  No home blood pressures  Denies voiding concerns  No appetite, but eating/drinking w/o n/v/d  Patient denies regular use of NSAIDs.    Chronic Health Problems:    CKD3  HTN  HLD  Pulmonary fibrosis  COPD    Family Hx:   Family History   Problem Relation Age of Onset     Heart Disease Mother      Personal Hx:    to Randal  Social History     Tobacco Use     Smoking status: Former Smoker     " "Packs/day: 0.50     Years: 17.00     Pack years: 8.50     Smokeless tobacco: Never Used   Substance Use Topics     Alcohol use: Not Currently       Allergies:  No Known Allergies    Medications:  Current Outpatient Medications   Medication Sig     sodium bicarbonate 650 MG tablet Take 2 tablets (1,300 mg) by mouth 3 times daily     acetaminophen (TYLENOL) 650 MG CR tablet Take 650 mg by mouth every 6 hours as needed     aspirin (ASA) 81 MG EC tablet Take 1 tablet (81 mg) by mouth daily     calcium carbonate 500 mg, elemental, (OSCAL 500) 1250 (500 Ca) MG TABS tablet Take 1 tablet by mouth daily     fexofenadine (ALLEGRA) 180 MG tablet Take 180 mg by mouth daily     FLUoxetine (PROZAC) 20 MG capsule Take 20 mg by mouth daily     fluticasone (FLONASE) 50 MCG/ACT nasal spray Spray 2 sprays into both nostrils daily      hypromellose (ARTIFICIAL TEARS) 0.5 % SOLN ophthalmic solution 1 drop 4 times daily as needed for dry eyes     NONFORMULARY Take 1 tablet by mouth daily Probiotic- unsure content.     omeprazole (PRILOSEC) 20 MG DR capsule Take 20 mg by mouth daily     oxybutynin ER (DITROPAN-XL) 5 MG 24 hr tablet Take 5 mg by mouth daily     pregabalin (LYRICA) 75 MG capsule Take 1 capsule (75 mg) by mouth daily     traZODone (DESYREL) 150 MG tablet Take 150 mg by mouth At Bedtime     triamcinolone (KENALOG) 0.1 % external cream Apply topically 2 times daily     vitamin D3 (CHOLECALCIFEROL) 50 mcg (2000 units) tablet Take 1 tablet by mouth daily     No current facility-administered medications for this visit.       Vitals:  Ht 1.549 m (5' 1\")   Wt 78.9 kg (174 lb)   BMI 32.88 kg/m      Exam:  No exam performed    LABS:   CMP  Recent Labs   Lab Test 02/01/21  1017 01/22/21  0706 01/21/21  0657 01/20/21  0653    140 142 142   POTASSIUM 4.8 4.3 4.9 4.6   CHLORIDE 113* 116* 116* 119*   CO2 15* 14* 15* 15*   ANIONGAP 14 9 11 8   GLC 92 120* 96 97   BUN 79* 74* 69* 74*   CR 2.09* 2.19* 2.03* 2.13*   GFRESTIMATED 22* " 21* 23* 21*   GFRESTBLACK 25* 24* 26* 25*   JACKIE 9.3 8.9 9.1 8.7     Recent Labs   Lab Test 01/18/21  0656   BILITOTAL 0.4   ALKPHOS 83   ALT 14   AST 23     CBC  Recent Labs   Lab Test 02/01/21  1017 01/20/21  0653 01/18/21  0656   HGB 9.1* 8.4* 9.2*   WBC 13.3* 7.8 7.0   RBC 3.41* 3.13* 3.31*   HCT 30.4* 27.4* 29.7*   MCV 89 88 90   MCH 26.7 26.8 27.8   MCHC 29.9* 30.7* 31.0*   RDW 17.2* 16.2* 15.9*   * 292 281     URINE STUDIES  Recent Labs   Lab Test 01/18/21  0745   COLOR Yellow   APPEARANCE Clear   URINEGLC Negative   URINEBILI Negative   URINEKETONE Negative   SG 1.014   UBLD Small*   URINEPH 5.5   PROTEIN 100*   NITRITE Negative   LEUKEST Trace*   RBCU 13*   WBCU 7*     Recent Labs   Lab Test 01/18/21  0745   UTPG 3.20*     PTH  No lab results found.  IRON STUDIES  No lab results found.    Ladonna Carolina, NP

## 2021-02-03 NOTE — PROGRESS NOTES
"Chief Complaint   Patient presents with     Follow Up     F/U HOSPITAL     Height 1.549 m (5' 1\"), weight 78.9 kg (174 lb).    .Sara is a 80 year old who is being evaluated via a billable telephone visit.      What phone number would you like to be contacted at? [phone]  How would you like to obtain your AVS? Mail a copy   Duration of call: 25 min  Phone call duration:  Minutes.Karmen Sams CMA     "

## 2021-02-04 NOTE — PROGRESS NOTES
"Nephrology telephone Visit 2/3/21    Assessment and Plan:    1. SHANT - Resolving. Creat 2.0, baseline 1.4-1.5. No voiding concerns. We did not obtain a UPCR. Admission UPCR was 3.2 g/gCr on 1/18/21   - Etiology of her SHANT undetermined. Attempted renal bx but unable given angle/thin cortex   - Given ongoing improvement will just continue to monitor   - Per discharge summary patient was taking Meloxicam but patient denies using? She is not using Ibuprofen and will only use Tylenol in the future. She denies pain    2. CKD3 - Baseline creat 1.4-1.5.    - She has intermit proteinuria on UA but not consistent   - Etiology of her CKD likely HTN   - Does not monitor blood pressures at home    3. HTN - Unknown control. Zestoretic on hold given SHANT. Has chronic edema w/o dyspnea. Was running 120-140/ during hospital admission  - Recommended home monitoring. Would not restart at this time    4. Electrolytes - No acute concerns. K 4.8, Na 142    5. Acid base - Ongoing metabolic acidosis, bicarb 15   - No diarrhea   - Taking bicarb 1300 mg bid   - Increase to TID    6. BMD - Ca 8.9, Phos 4.5, albumin 2.2   - Taking Oscal daily and Vit D 2000 unit(s) every day   - Will check Vit d and PTH    7. Anemia - Hgb 9.1. Baseline 10-11   - Check iron studies next visit    8. Disposition - RTC one month for f/u with labs prior    Assessment and plan was discussed with patient and she voiced her understanding and agreement.      Reason for Visit:  Post hospital SHANT/CKD follow up    HPI:  Ms Ashton is a 79 yo female with COPD, Pulmonary fibrosis, CKD3, HTN, HLD with recent hospital admission 1/17-1/22/21 with SHANT, Acute vasculitis. Peak creat 2.7. Baseline creat 1.4-1.5. Discharge creat 2.1.   Per Dr Vazquez's discharge summary:   \"Suspected small to medium sized vasculitis possibly provoked by recent cellulitis vs antibiotic use vs other autoimmune process. Less likely DIHS given lack of fever, hepatitis, or LAD. Unlikely malignancy given lack " "of weight loss or constitutional symptoms, although she admits that she is overdue for colon and breast cancer screening. Complements normal, MPO & PR3 normal, JEFE borderline positive, ANCA negative, ASO and CCP normal, RF normal. Hep B and C normal, HIV neg. Protein studies show marked hypoalbuminemia with elevated globulins, suggesting glomerular damage and acute phase reaction. No e/o monoclonal process. Rheumatology and dermatology were consulted. 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 Started triamcinolone cream BID per derm recs to rash.\" Has f/u with Derm this week.   Patient notes rash is better, drying up.     ROS:   No complaints other than fatigue  Denies CP, dyspnea  Rash is drying up  Stable LE edema  No home blood pressures  Denies voiding concerns  No appetite, but eating/drinking w/o n/v/d  Patient denies regular use of NSAIDs.    Chronic Health Problems:    CKD3  HTN  HLD  Pulmonary fibrosis  COPD    Family Hx:   Family History   Problem Relation Age of Onset     Heart Disease Mother      Personal Hx:    to Randal  Social History     Tobacco Use     Smoking status: Former Smoker     Packs/day: 0.50     Years: 17.00     Pack years: 8.50     Smokeless tobacco: Never Used   Substance Use Topics     Alcohol use: Not Currently       Allergies:  No Known Allergies    Medications:  Current Outpatient Medications   Medication Sig     sodium bicarbonate 650 MG tablet Take 2 tablets (1,300 mg) by mouth 3 times daily     acetaminophen (TYLENOL) 650 MG CR tablet Take 650 mg by mouth every 6 hours as needed     aspirin (ASA) 81 MG EC tablet Take 1 tablet (81 mg) by mouth daily     calcium carbonate 500 mg, elemental, (OSCAL 500) 1250 (500 Ca) MG TABS tablet Take 1 tablet by mouth daily     fexofenadine (ALLEGRA) 180 MG tablet Take 180 mg by mouth daily     FLUoxetine (PROZAC) 20 MG capsule Take 20 mg by " "mouth daily     fluticasone (FLONASE) 50 MCG/ACT nasal spray Spray 2 sprays into both nostrils daily      hypromellose (ARTIFICIAL TEARS) 0.5 % SOLN ophthalmic solution 1 drop 4 times daily as needed for dry eyes     NONFORMULARY Take 1 tablet by mouth daily Probiotic- unsure content.     omeprazole (PRILOSEC) 20 MG DR capsule Take 20 mg by mouth daily     oxybutynin ER (DITROPAN-XL) 5 MG 24 hr tablet Take 5 mg by mouth daily     pregabalin (LYRICA) 75 MG capsule Take 1 capsule (75 mg) by mouth daily     traZODone (DESYREL) 150 MG tablet Take 150 mg by mouth At Bedtime     triamcinolone (KENALOG) 0.1 % external cream Apply topically 2 times daily     vitamin D3 (CHOLECALCIFEROL) 50 mcg (2000 units) tablet Take 1 tablet by mouth daily     No current facility-administered medications for this visit.       Vitals:  Ht 1.549 m (5' 1\")   Wt 78.9 kg (174 lb)   BMI 32.88 kg/m      Exam:  No exam performed    LABS:   CMP  Recent Labs   Lab Test 02/01/21  1017 01/22/21  0706 01/21/21  0657 01/20/21  0653    140 142 142   POTASSIUM 4.8 4.3 4.9 4.6   CHLORIDE 113* 116* 116* 119*   CO2 15* 14* 15* 15*   ANIONGAP 14 9 11 8   GLC 92 120* 96 97   BUN 79* 74* 69* 74*   CR 2.09* 2.19* 2.03* 2.13*   GFRESTIMATED 22* 21* 23* 21*   GFRESTBLACK 25* 24* 26* 25*   JACKIE 9.3 8.9 9.1 8.7     Recent Labs   Lab Test 01/18/21  0656   BILITOTAL 0.4   ALKPHOS 83   ALT 14   AST 23     CBC  Recent Labs   Lab Test 02/01/21  1017 01/20/21  0653 01/18/21  0656   HGB 9.1* 8.4* 9.2*   WBC 13.3* 7.8 7.0   RBC 3.41* 3.13* 3.31*   HCT 30.4* 27.4* 29.7*   MCV 89 88 90   MCH 26.7 26.8 27.8   MCHC 29.9* 30.7* 31.0*   RDW 17.2* 16.2* 15.9*   * 292 281     URINE STUDIES  Recent Labs   Lab Test 01/18/21  0745   COLOR Yellow   APPEARANCE Clear   URINEGLC Negative   URINEBILI Negative   URINEKETONE Negative   SG 1.014   UBLD Small*   URINEPH 5.5   PROTEIN 100*   NITRITE Negative   LEUKEST Trace*   RBCU 13*   WBCU 7*     Recent Labs   Lab Test " 01/18/21  0745   UTPG 3.20*     PTH  No lab results found.  IRON STUDIES  No lab results found.    Ladonna Carolina, NP

## 2021-02-05 NOTE — TELEPHONE ENCOUNTER
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 no    Has patient been seen for any external appt for this diagnosis? no    If yes, where? n/a    Has patient had any imaging or procedures outside of [place]  [place] for this condition? no      If Yes, where? n/a    ADDITIONAL INFORMATION:  none

## 2021-02-08 NOTE — TELEPHONE ENCOUNTER
RECORDS STATUS - ALL OTHER DIAGNOSIS      RECORDS RECEIVED FROM: [facility]   DATE RECEIVED: 2/8/21   NOTES STATUS DETAILS   OFFICE NOTE from referring provider Liss Jose MD in [facility]   OFFICE NOTE from medical oncologist     DISCHARGE SUMMARY from hospital     DISCHARGE REPORT from the ER     OPERATIVE REPORT     MEDICATION LIST     CLINICAL TRIAL TREATMENTS TO DATE     LABS     PATHOLOGY REPORTS [facility] 1/18/21: Surg Path/Dermatopathology   ANYTHING RELATED TO DIAGNOSIS Epic 2/4/21   GENONOMIC TESTING     TYPE:     IMAGING (NEED IMAGES & REPORT)     CT SCANS     MRI     MAMMO     ULTRASOUND     PET

## 2021-03-02 NOTE — TELEPHONE ENCOUNTER
Discussed with Dr. Baig.  He would like to see pt in clinic in person.    Spoke with pt's , she is currently in the hospital at [facility] and he does not know when she will be home.    Akanksha Martin RN  Rheumatology Clinic

## 2021-03-30 ENCOUNTER — RECORDS - HEALTHEAST (OUTPATIENT)
Dept: ADMINISTRATIVE | Facility: OTHER | Age: 81
End: 2021-03-30

## 2021-04-08 ENCOUNTER — COMMUNICATION - HEALTHEAST (OUTPATIENT)
Dept: FAMILY MEDICINE | Facility: CLINIC | Age: 81
End: 2021-04-08

## 2021-05-25 ENCOUNTER — RECORDS - HEALTHEAST (OUTPATIENT)
Dept: ADMINISTRATIVE | Facility: CLINIC | Age: 81
End: 2021-05-25

## 2021-05-26 ENCOUNTER — RECORDS - HEALTHEAST (OUTPATIENT)
Dept: ADMINISTRATIVE | Facility: CLINIC | Age: 81
End: 2021-05-26

## 2021-05-27 ENCOUNTER — RECORDS - HEALTHEAST (OUTPATIENT)
Dept: ADMINISTRATIVE | Facility: CLINIC | Age: 81
End: 2021-05-27

## 2021-05-27 VITALS
RESPIRATION RATE: 16 BRPM | DIASTOLIC BLOOD PRESSURE: 69 MMHG | HEART RATE: 66 BPM | SYSTOLIC BLOOD PRESSURE: 125 MMHG | TEMPERATURE: 97.5 F | OXYGEN SATURATION: 96 %

## 2021-05-27 VITALS
DIASTOLIC BLOOD PRESSURE: 67 MMHG | RESPIRATION RATE: 24 BRPM | OXYGEN SATURATION: 98 % | SYSTOLIC BLOOD PRESSURE: 149 MMHG | HEART RATE: 96 BPM

## 2021-05-27 ASSESSMENT — PATIENT HEALTH QUESTIONNAIRE - PHQ9
SUM OF ALL RESPONSES TO PHQ QUESTIONS 1-9: 1
SUM OF ALL RESPONSES TO PHQ QUESTIONS 1-9: 8
SUM OF ALL RESPONSES TO PHQ QUESTIONS 1-9: 0

## 2021-05-27 NOTE — TELEPHONE ENCOUNTER
Refill Approved    Rx renewed per Medication Renewal Policy. Medication was last renewed on 8/23/18.    Alena Jeffers, [facility] Triage/Med Refill 4/10/2019     Requested Prescriptions   Pending Prescriptions Disp Refills     VENTOLIN HFA 90 mcg/actuation inhaler [Pharmacy Med Name: VENTOLIN HFA 90 MCG INHALER] 18 Inhaler 2     Sig: INHALE 1-2 PUFFS EVERY FOUR HOURS AS NEEDED FOR WHEEZING, SHORTNESS OF BREATH, OR COUGH.       Albuterol/Levalbuterol Refill Protocol Passed - 4/9/2019 12:18 PM        Passed - PCP or prescribing provider visit in last year     Last office visit with prescriber/PCP: 3/20/2019 Agustín Craft MD OR same dept: 3/20/2019 Agustín Craft MD OR same specialty: 3/20/2019 Agustín Craft MD Last physical: 11/1/2017       Next appt within 3 mo: Visit date not found  Next physical within 3 mo: Visit date not found  Prescriber OR PCP: Agustín Craft MD  Last diagnosis associated with med order: 1. Dyspnea on exertion  - VENTOLIN HFA 90 mcg/actuation inhaler [Pharmacy Med Name: VENTOLIN HFA 90 MCG INHALER]; INHALE 1-2 PUFFS EVERY FOUR HOURS AS NEEDED FOR WHEEZING, SHORTNESS OF BREATH, OR COUGH.  Dispense: 18 Inhaler; Refill: 2    If protocol passes may refill for 6 months if within 3 months of last provider visit (or a total of 9 months). If patient requesting >1 inhaler per month refill x 6 months and have patient make appointment with provider.

## 2021-05-28 ENCOUNTER — RECORDS - HEALTHEAST (OUTPATIENT)
Dept: ADMINISTRATIVE | Facility: CLINIC | Age: 81
End: 2021-05-28

## 2021-05-28 NOTE — PROGRESS NOTES
"TCM DISCHARGE FOLLOW UP CALL    Discharge Date:  5/11/2019  Reason for hospital stay (discharge diagnosis)::  COPD  Are you feeling better, the same or worse since your discharge?:  Patient is feeling better (Breathing improved.  Using nebs PRN)  Do you feel like you have a plan in the event of a health emergency?: Yes (Call 911)    As part of your discharge plan, were  home care services ordered for you?: No    Did you receive any new medications, or was there a change to your medications?: Yes    Are you taking those medications, or do you have any established regiment?:  RN reviewed discharge medications w/pt.  Pt states she took four 5 mg tabs of prednisone yesterday morning & last night before bed, 3 tabs this morning and will take another 3 tabs at dinner; \"I'm supposed to take it twice a day, according to the bottle.\"  RN had pt read prescription label to RN, pt states label reads \"take 4 tabs x 2, then 3 tabs x 2, then 1 tab x 2; I thought x 2 meant to take that dose twice a day.\"  RN instructed pt since she took 8 tabs of prednisone yesterday, instead of 4 tabs x 2 days, and is already on 3 tabs today, she should continue 3 tabs tomorrow, then go down to 2 tabs on the 15th, and discuss next steps w/PCP at f/u appt on the 15th.  Pt verbalized understanding & agrees w/plan.   Do you have any follow up visits scheduled with your PCP or Specialist?:  Yes, with PCP (Ori ADAM 5/15/19)  (RN) Is PCP appt scheduled soon enough (within 14 days of discharge date)?: Yes    RN NOTES::  States she attempted to schedule pulm f/u for 2 weeks today, but unable to schedule w/in recommended time frame.  She will discuss w/PCP at f/u appt on the 15th.        Elyssa Jacob RN Care Manager, Population Health    "

## 2021-05-28 NOTE — TELEPHONE ENCOUNTER
What is your question/concern?: Pharmacy states insurance will not cover med with current DX. Needs alternate Dx.

## 2021-05-28 NOTE — PROGRESS NOTES
MTM Transition of Care Encounter  Assessment & Plan                                                     Post Discharge Medication Reconciliation Status: discharge medications reconciled and changed, per note/orders (see AVS)    COPD/Interstitial Fibrosis: Somewhat improved. Encouraged continued inhaler use -- ok to use albuterol as Xopenex use would be duplicate therapy. Patient to follow up with pulmonology and based on diagnosis may need additional therapy.     Depression: Stable. Recommended to continue current regimen.     CHF: Patient to continue checking weight daily. BMP next week at PCP appt. Consider decreasing to 20 mg if patient's kidney function worsens.     Hypertension: Stable. Recommended to continue current regimen.     Pain: Stable, continue current regimen but consider talking to Dr. Craft about increasing Lyrica back to 100 mg two times a day. Reviewed long-term risks with PPIs.     Insomnia: Uncontrolled. Reviewed to continue to monitor especially once she is done with prednisone this weekend. If still not having a good time falling asleep, could consider increasing trazodone to 200 mg HS.     GERD: Stable. Recommended to continue current regimen especially since she is on chronic meloxicam.     UI: Stable. Recommended to continue current regimen.     Allergies: Stable. Recommended to continue current regimen.     Osteopenia: Reviewed to continue calcium citrate since she is on PPI. Last Vitamin D lebel was WNL, but value is old -- consider rechecking in the future along with DEXA since she is due.   PLAN:   1. Future DEXA and Vitamin D.     Follow Up  As needed with MTM -- consider discussion with Jt in person for medication education.     Subjective & Objective                                                       Sara Ashton is a 79 y.o. female called for a transitions of care visit. she was discharged from [facility] on 5/11/19 for COPD.    Chief Complaint: tired and no energy. Of note,  patient did interrupt a lot and was short to answer my questions.     Medication Adherence/Access: Takes most of her medications in the morning.     COPD/Interstitial Fibrosis: Referred to ED on 5/10 from PCP for shortness of breath and O2 sat 85% on room air in the clinic. CT scan showed interstitial fibrosis changes and 4 mm right upper lobe nodule that was recommended to be followed outpatient. Recommended to follow up with pulmonology -- appointment on 6/11 for PFTs and 6/25 with Dr. Morin. Current medications: prednisone taper (started at discharge), albuterol neb, Xopenex neb, and Ventolin HFA. Will be done with prednisone tomorrow. Is not using Xopenex. Reports using albuterol neb once yesterday and HFA about every 6 hours. Feels like her breathing will get worse with rain this weekend.   Reports SOB only if she exerts herself. Denies sputum production -- improved.  Pt is not on oxygen.  Pt does not currently smoke - quit 30 years ago.    Depression: Currently taking fluoxetine 20 mg x2 (40 mg daily.  Stressors include  has dementia, he is not progressing fast.  Helped her relax more. Knows how to cope with it.     CHF: Diastolic dysfunction. Current medications include fuosemide 20 mg x2 (40 mg) daily.   ECHO: Date 2/27/19, EF 60%  Pt is measuring daily weights: weight coming down.   Pt is following a low sodium diet and avoiding etoh.     Hypertension: Currently taking lisinopril-HCTZ 20-12.5 mg daily AM. Patient does not monitor BP at home. Denies lightheadedness/dizziness.     Pain: Currently taking meloxciam 15 mg daily, hydrocodone-APAP 5-325 mg PRN, Lyrica 75 mg two times a day, and APAP.   Does not use ibuprofen. Takes meloxicam with food. Arthritis all over. Also has neuropathy which is worsening. Will take hydrocodone-APAP AM PRN and PM regularly because her neuropathy effects her sleep. Will sometimes skip hydro-APAP AM dose, and if she does will take APAP. Hydro-APAP takes the edge off so  "she can sleep. No constipation, watches her diet. Lyrica 75 mg is a \"wonder drug\" -- was decreased from 100 mg two times a day and she thinks her symptoms have worsened.    Insomnia: Currently taking trazodone 150 mg HS -- doesn't do a good job, reports gave an extra dose in the hospital. Hard time falling asleep with prednisone. Might be worn out since she is not sleeping.     GERD: Currently taking omeprazole 20 mg daily. No problems.     UI: Currently taking oxybutynin XL 5 mg daily. Doing well. Has dry mouth, uses Biotene.     Allergies: Currently taking Allegra and fluticasone nasal spray.    Osteopenia: Currently taking calcium citrate and Vitamin D 2000 IU daily. Last DEXA in 2012 T score -1.4. Was on ibandronate for 5 years and given a holiday in 2012.   Vitamin D, Total (25-Hydroxy)   Date Value Ref Range Status   12/20/2010 39.9 30.0 - 80.0 ng/mL Final     Comment:                    Deficiency              <10.0 ng/mL               Insufficiency       10.0-29.9 ng/mL               Sufficiency         30.0-80.0 ng/mL               Toxicity (possible)    >100.0 ng/mL         PMH: reviewed in EPIC   Allergies/ADRs: reviewed in EPIC   Alcohol: Does not drink  Tobacco:   Social History     Tobacco Use   Smoking Status Former Smoker     Packs/day: 0.50     Years: 25.00     Pack years: 12.50   Smokeless Tobacco Never Used   Tobacco Comment    quit 25 yrs ago     Recent Vitals:   BP Readings from Last 3 Encounters:   05/15/19 122/50   05/11/19 147/61   05/10/19 146/66      Wt Readings from Last 3 Encounters:   05/15/19 183 lb (83 kg)   05/10/19 186 lb 1.6 oz (84.4 kg)   05/10/19 188 lb 6.4 oz (85.5 kg)     ----------------    The patient declined an after visit summary    I spent 30 minutes with this patient today;  . All changes were made via collaborative practice agreement with Agustín Craft MD. A copy of the visit note was provided to the patient's provider.     Jade Narvaez, Pharm.D., [facility]  Medication " Therapy Management Pharmacist  [facility]     Current Outpatient Medications   Medication Sig Dispense Refill     acetaminophen (TYLENOL ARTHRITIS) 650 MG CR tablet Take 650 mg by mouth 4 (four) times a day as needed for pain.       albuterol (PROVENTIL) 2.5 mg /3 mL (0.083 %) nebulizer solution Take 3 mL (2.5 mg total) by nebulization every 6 (six) hours as needed for wheezing. 75 vial 3     aspirin 81 MG EC tablet Take 81 mg by mouth daily.              calcium, as carbonate, (OS-JACKIE) 500 mg calcium (1,250 mg) tablet Take 1 tablet by mouth daily.       cetirizine-pseudoephedrine (ALLERGY RELIEF-D, CETIRIZINE,) 5-120 mg per tablet Take 1 tablet by mouth daily. 90 tablet 3     cholecalciferol, vitamin D3, (VITAMIN D3) 2,000 unit Tab Take 1 tablet by mouth daily.       FLUoxetine (PROZAC) 20 MG capsule Take 2 capsules (40 mg total) by mouth daily. 180 capsule 0     fluticasone propionate (FLONASE) 50 mcg/actuation nasal spray Apply 1 spray into each nostril 2 (two) times a day.       furosemide (LASIX) 20 MG tablet Take 2 tablets (40 mg total) by mouth daily. 60 tablet 2     GLUCOSAMINE SULFATE (GLUCOSAMINE ORAL) Take 1 capsule by mouth daily.              HYDROcodone-acetaminophen 5-325 mg per tablet TAKE ONE TABLET BY MOUTH TWICE DAILY AS NEEDED FOR PAIN 60 tablet 0     hypromellose (ARTIFICIAL TEARS,FGRC40-XOFHU,) Drop Administer 1 drop to both eyes 4 (four) times a day as needed (dry eyes). 30 mL 11     LACTOBACILLUS ACIDOPHILUS (PROBIOTIC ORAL) Take by mouth daily.       levalbuterol (XOPENEX) 1.25 mg/3 mL nebulizer solution Take 3 mL every 4 hours as needed for Reactive Airway disease. 75 mL 6     lisinopril-hydrochlorothiazide (PRINZIDE,ZESTORETIC) 20-12.5 mg per tablet TAKE 1 TABLET BY MOUTH EVERY DAY 90 tablet 2     meloxicam (MOBIC) 15 MG tablet Take 1 tablet (15 mg total) by mouth daily. 90 tablet 1     nebulizer and compressor ([employer] AEROSOL DELIVERY SYSTEM) Kristina 1 nebulization every 6  hours as needed for wheezing. 1 each 0     omeprazole (PRILOSEC) 20 MG capsule TAKE ONE CAPSULE BY MOUTH ONE TIME DAILY 90 capsule 3     oxybutynin (DITROPAN XL) 5 MG ER tablet Take 1 tablet (5 mg total) by mouth daily. 30 tablet 11     [START ON 5/18/2019] predniSONE (DELTASONE) 10 mg tablet Take 10 mg by mouth daily with breakfast. 3 tablet 0     predniSONE (DELTASONE) 5 MG tablet Take 15 mg by mouth daily with breakfast. 3 tablet 0     [START ON 5/21/2019] predniSONE (DELTASONE) 5 MG tablet Take 5 mg by mouth daily with breakfast. 3 tablet 0     pregabalin (LYRICA) 75 MG capsule Take 1 capsule (75 mg total) by mouth 2 (two) times a day. 60 capsule 3     traZODone (DESYREL) 150 MG tablet Take 1 tablet (150 mg total) by mouth at bedtime. 90 tablet 3     VENTOLIN HFA 90 mcg/actuation inhaler INHALE 1-2 PUFFS EVERY FOUR HOURS AS NEEDED FOR WHEEZING, SHORTNESS OF BREATH, OR COUGH. 18 Inhaler 2     No current facility-administered medications for this visit.

## 2021-05-28 NOTE — TELEPHONE ENCOUNTER
CMT contacted pharmacy to collect more information. CMT asked pharmacist to explain the problem with the prescription in more detail. The pharmacist stated that Medicare notified them that they will not cover the Levalbuterol with a diagnosis of bronchospasm. Pharmacy states that provider can try to resend prescription with different diagnosis.

## 2021-05-28 NOTE — TELEPHONE ENCOUNTER
Who is calling: [facility]  Reason for Call:  Received RX with same Diagnosis code- this NOT  Approved by patients insurance . Please send new RX with alternative DX code asap.   Date of last appointment with primary care:    Has the patient been recently seen:  Yes  Okay to leave a detailed message: Yes

## 2021-05-28 NOTE — PROGRESS NOTES
Assessment:       Shortness of breath.    Medical Decision Making  Patient's presentation for shortness of breath is concerning for cardiac etiology versus a pulmonary embolism. Her cardiac rhythm is irregular, which prompted and ECG that showed a new RBB and multiple PVC's. Patient's oxygen saturation is currently at 91%, unchanged from her vital signs on 4/24 when her O2 saturation was 92%. Lung sounds are completely normal.      Plan:       Patient sent to ED for further workup following multiple PVC's seen on ECG and concern for possible pulmonary embolism.  Called ED provider from [facility] ED and provided report and reason for patient transfer. Discussed plan with patient. She agrees with plan. Patient is stable at time of discharge.      Subjective:       Sara Ashton is a 79 y.o. female here for evaluation of shortness of breath. Onset has been since January of this year with on-and-off symptoms since that time. She has been evaluated by cardiology with echocardiogram in February. She was found to have diastolic dysfunction that was possibly contributing to the shortness of breath. She has been taking furosemide 40mg daily with moderate relief. Her shortness of breath suddenly worsened again since 4/24 when she was seen by primary care. She was started on a short course of oral steroids and an albuterol nebulizer. Patient states that these did not help her symptoms, although steroids have been helpful in the past. Associated symptoms include dyspnea on exertion. Shortness of breath improves while resting. Patient denies fevers, chest pains, and leg swelling.    The following portions of the patient's history were reviewed and updated as appropriate: allergies, current medications and problem list.    Review of Systems  A 12 point comprehensive review of systems was negative except as noted.     Allergies  No Known Allergies      Objective:       /66   Pulse 62   Temp 98.1  F (36.7  C) (Oral)    Resp 18   Wt 188 lb 6.4 oz (85.5 kg)   SpO2 91%   BMI 35.60 kg/m    General appearance: alert, appears stated age, cooperative, no distress and non-toxic  Head: Normocephalic, without obvious abnormality, atraumatic, sinuses nontender to percussion  Ears: TM's intact with mild serous fluid, no erythema or bulging; external ears normal  Nose: no discharge  Throat: lips, mucosa, and tongue normal; teeth and gums normal  Neck: no adenopathy and supple, symmetrical, trachea midline  Lungs: clear to auscultation bilaterally and no rhonchi, rales, or wheezing  Heart: irregular rate and rhythm, normal S1 and S2, no M/R/G  Pulses: intact and symmetrical     Lab Results    Recent Results (from the past 24 hour(s))   Electrocardiogram Perform and Read   Result Value Ref Range    SYSTOLIC BLOOD PRESSURE  mmHg    DIASTOLIC BLOOD PRESSURE  mmHg    VENTRICULAR RATE 78 BPM    ATRIAL RATE 78 BPM    P-R INTERVAL 136 ms    QRS DURATION 126 ms    Q-T INTERVAL 398 ms    QTC CALCULATION (BEZET) 453 ms    P Axis 41 degrees    R AXIS 61 degrees    T AXIS 12 degrees    MUSE DIAGNOSIS       Sinus rhythm with frequent Premature ventricular complexes  Right bundle branch block  Abnormal ECG  When compared with ECG of 11-JUN-2018 10:29,  Premature ventricular complexes are now Present  Right bundle branch block is now Present     Influenza A/B Rapid Test   Result Value Ref Range    Influenza  A, Rapid Antigen No Influenza A antigen detected No Influenza A antigen detected    Influenza B, Rapid Antigen No Influenza B antigen detected No Influenza B antigen detected     I personally reviewed these results and discussed findings with the patient.

## 2021-05-28 NOTE — TELEPHONE ENCOUNTER
Medication Question or Clarification  Who is calling: Pharmacy: [facility]  What medication are you calling about? (include dose and sig)   levalbuterol (XOPENEX) 1.25 mg/3 mL nebulizer solution 75 mL 6 4/24/2019     Sig - Route: Take 3 mL (1.25 mg total) by nebulization every 4 (four) hours as needed for wheezing. - Nebulization    Sent to pharmacy as: levalbuterol (XOPENEX) 1.25 mg/3 mL nebulizer solution        Who prescribed the medication?: Ori Hill PA-C  What is your question/concern?: Pharmacy states insurance will not cover med with current DX. Needs alternate Dx.  Pharmacy: [facility]  Okay to leave a detailed message?: Yes  Site CMT - Please call the pharmacy to obtain any additional needed information.

## 2021-05-28 NOTE — TELEPHONE ENCOUNTER
Medication Question or Clarification  Who is calling: Pharmacy: [facility]  What medication are you calling about? (include dose and sig)   FLUoxetine (PROZAC) 20 MG tablet 180 tablet 3 9/24/2018     Sig - Route: Take 2 tablets (40 mg total) by mouth daily. - Oral    Sent to pharmacy as: FLUoxetine (PROZAC) 20 MG tablet    E-Prescribing Status: Receipt confirmed by pharmacy (9/24/2018  3:22 PM CDT)        Who prescribed the medication?: Agustín Craft MD    What is your question/concern?: the tablets are not covered by the insurance but the capsules are do you want to change this Rx?   Pharmacy: [facility]  Okay to leave a detailed message?: Yes  Site CMT - Please call the pharmacy to obtain any additional needed information.

## 2021-05-28 NOTE — PROGRESS NOTES
"ASSESMENT AND PLAN:  1. Bronchospasm, can not r/o Pneumonia  -  Pt with complaints of shortness of breath/wheezing. Denies fevers/chills.  -  Lungs with mild crackles on left lower lung.    -  Pt has been coughing x 2-3 months. Was on Guaifenisin with codeine and Zpak with little relief and given Prednisone with complete relief. Pt is requesting Prednisone.  -  Discussed risks/benefits of steroids and indications for emergent f/u.  -  Pt also wants Xopenex refilled, which will help her shortness of breath/reactive airway disease.    Orders:   - predniSONE (DELTASONE) 20 MG tablet; Take 20 mg by mouth 2 (two) times a day for 5 days.  Dispense: 10 tablet; Refill: 0   Refill:   - levalbuterol (XOPENEX) 1.25 mg/3 mL nebulizer solution; Take 3 mL every 4 hours as needed  for Asthma.  Dispense: 75 mL; Refill: 6    2. Acute congestive heart failure, unspecified heart failure type (H)  - Mild pedal edema bilaterally.  Will Furosemide dose to 40mg daily.  She reports this has helped in the past.    Orders:   - furosemide (LASIX) 20 MG tablet; Take 2 tablets (40 mg total) by mouth daily.  Dispense: 60 tablet; Refill: 2    Reviewed Medical/Social history and Medications.  See new changes above.   Discussed indications for emergent medical attention and routine F/u.  Patient/Parent/Guardian engaged in decision making process and verbalized understanding of the options discussed and agreed with the final treatment plan.     SUBJECTIVE:  Sara Ashton is a 79 y.o. female who presents  for evaluation of her Shortness of Breath.    Pt has been coughing x 2-3 months and has been on several meds including guaifenisin w/codeine and Zpak. Pt rtc in Feb and given Prednisone, which resolved sxs.  Exam today show mild crackle on left lower lung.  Suggest Chest Xray, however Pt decline, \" I know when I'm sick.\"  Pt would like to take Prednisone again,\" I had this last time and Prednisone worked.\"  I went over risks/benefits, Pt reports " understanding.   Denies fever/chills, wheezing, SOB, CP, n/v, abdominal pain, diarrhea/constipation, hematochezia, hematemesis.     ROS:  Comprehensive Review of Systems Negative except stated in HPI.     Past Medical History:   Diagnosis Date     Anxiety      Bronchitis with bronchospasm 6/11/2018     Depression      Diverticulitis      GERD (gastroesophageal reflux disease)      Hyperlipidemia      Hypertension      Osteoarthritis      Patient Active Problem List   Diagnosis     Osteoporosis     Obesity     Anxiety     Diverticulosis     Benign Essential Hypertension     Osteopenia     Dermatitis     Hyperlipidemia     Major depression     Lower Back Pain     Allergic Rhinitis     Localized Primary Osteoarthritis Of The Knee     Restless Legs Syndrome     GERD (gastroesophageal reflux disease)     Neuropathy (H)     Osteoarthritis, multiple sites     Diastolic dysfunction     Current Outpatient Medications   Medication Sig Dispense Refill     acetaminophen (TYLENOL ARTHRITIS) 650 MG CR tablet Take 650 mg by mouth 4 (four) times a day as needed for pain.       albuterol (PROVENTIL) 2.5 mg /3 mL (0.083 %) nebulizer solution Take 3 mL (2.5 mg total) by nebulization every 6 (six) hours as needed for wheezing. 75 vial 3     amitriptyline (ELAVIL) 25 MG tablet TAKE ONE TABLET BY MOUTH NIGHTLY AT BEDTIME 60 tablet 3     aspirin 81 MG EC tablet Take 81 mg by mouth daily.              CA CARB & GLUC/MAG OX & GLUC (CALCIUM MAGNESIUM ORAL) Take by mouth. 600-100-300 liquid       cetirizine-pseudoephedrine (ALLERGY RELIEF-D, CETIRIZINE,) 5-120 mg per tablet Take 1 tablet by mouth daily. 90 tablet 3     cholecalciferol, vitamin D3, (VITAMIN D3) 2,000 unit Tab Take 1 tablet by mouth daily.       ECHINACEA ORAL Take 1 capsule by mouth daily.       FLUoxetine (PROZAC) 20 MG capsule Take 2 capsules (40 mg total) by mouth daily. 180 capsule 0     fluticasone (FLONASE) 50 mcg/actuation nasal spray INSTILL 2 SPRAYS IN EACH NOSTRIL  ONE TIME DAILY 16 g 6     fluticasone (FLONASE) 50 mcg/actuation nasal spray INSTILL 2 SPRAYS IN EACH NOSTRIL ONE TIME DAILY 48 g 3     FOLIC ACID/MULTIVITS-MIN/LUT (CENTRUM SILVER ORAL) Take 1 tablet by mouth 3 (three) times a day.       furosemide (LASIX) 20 MG tablet Take 2 tablets (40 mg total) by mouth daily. 60 tablet 2     ginkgo biloba 40 mg Tab Take 1 tablet by mouth daily.       GLUCOSAMINE SULFATE (GLUCOSAMINE ORAL) Take 2 capsules by mouth daily.       HYDROcodone-acetaminophen 5-325 mg per tablet TAKE ONE TABLET BY MOUTH TWICE DAILY AS NEEDED FOR PAIN 60 tablet 0     hypromellose (ARTIFICIAL TEARS,[id number],) Drop Administer 1 drop to both eyes 4 (four) times a day as needed (dry eyes). 30 mL 11     LACTOBACILLUS ACIDOPHILUS (PROBIOTIC ORAL) Take by mouth daily.       levalbuterol (XOPENEX) 1.25 mg/3 mL nebulizer solution Take 3 mL every 4 hours as needed  for Asthma. 75 mL 6     lisinopril-hydrochlorothiazide (PRINZIDE,ZESTORETIC) 20-12.5 mg per tablet TAKE 1 TABLET BY MOUTH EVERY DAY 90 tablet 2     meloxicam (MOBIC) 15 MG tablet Take 1 tablet (15 mg total) by mouth daily. 90 tablet 1     nebulizer and compressor ([employer] AEROSOL DELIVERY SYSTEM) Kristina 1 nebulization every 6 hours as needed for wheezing. 1 each 0     omeprazole (PRILOSEC) 20 MG capsule TAKE ONE CAPSULE BY MOUTH ONE TIME DAILY 90 capsule 3     oxybutynin (DITROPAN XL) 5 MG ER tablet Take 1 tablet (5 mg total) by mouth daily. 30 tablet 11     pregabalin (LYRICA) 75 MG capsule Take 1 capsule (75 mg total) by mouth 2 (two) times a day. 60 capsule 3     traZODone (DESYREL) 150 MG tablet Take 1 tablet (150 mg total) by mouth at bedtime. 90 tablet 3     VENTOLIN HFA 90 mcg/actuation inhaler INHALE 1-2 PUFFS EVERY FOUR HOURS AS NEEDED FOR WHEEZING, SHORTNESS OF BREATH, OR COUGH. 18 Inhaler 2     predniSONE (DELTASONE) 20 MG tablet Take 20 mg by mouth 2 (two) times a day for 5 days. 10 tablet 0     No current facility-administered medications  for this visit.      Social History     Tobacco Use   Smoking Status Former Smoker     Packs/day: 0.50     Years: 25.00     Pack years: 12.50   Smokeless Tobacco Never Used   Tobacco Comment    quit 25 yrs ago     OBJECTIVE: /50   Pulse 70   Temp 98.3  F (36.8  C) (Oral)   Resp 16   SpO2 92%    No results found for this or any previous visit (from the past 24 hour(s)).    PHYSICAL:  General Alert, awake, not in acute distress.   CV Normal S1 & S2. No murmurs.   RESP Non-labored, RRR, CTAB. No wheezes or crackles.    EXTREMITY Mild pedal edema.        Ori Hill PA-C

## 2021-05-28 NOTE — PROGRESS NOTES
"ASSESMENT AND PLAN:  1. Hospital discharge follow-up  -  Pt was seen at [facility] on 5/10-5/11 and dx with COPD exacerbation. CT negative. Slightly elevated creatinine, 1.26,  and BUN, 31.  -  Reviewed pertinent labs and reports.  Pt is not aware she was dx with COPD. She does have appt to see Pulmonology on June 11th along with another future appt.   -  VS normal and stable.   Pt feels much better, though not back to normal yet. She does not want lab work today and will have it on next visit with PCP in a few days, 5/21.    Orders:   - BMP, Future order.     2. Neuropathy (H)  -  Controlled on current tx. Refill request.   Refill:   - HYDROcodone-acetaminophen 5-325 mg per tablet; TAKE ONE TABLET BY MOUTH TWICE DAILY AS NEEDED FOR PAIN  Dispense: 60 tablet; Refill: 0    Reviewed Medical/Social history and Medications. No new changes.  Discussed indications for emergent medical attention and routine F/u.  Patient/Parent/Guardian engaged in decision making process and verbalized understanding of the options discussed and agreed with the final treatment plan.     SUBJECTIVE:  Sara Ashton is a 79 y.o. female who presents  for evaluation of her Follow-up ([facility] 5/10-5/11/19 for COPD with acute exacerbation.      Pt reports feeling better but \"not back to my old self. I know I'm fighting something.\"  Hospital workup, CT negative for PE.  She also has itchy throat  and ears since she gotten out of the hospital.  Advise Pt to continue taking her allergy meds.       Denies fever/chills, wheezing, SOB, sinus pressure, CP, sore throat, n/v, abdominal pain, diarrhea/constipation, hematochezia.  Pt endorses feeling shortness of breath if she is active, \" I have to be careful not to do too much.\"     Pt is upset that hospital did not tell her she was dx with COPD. I encouraged her to keep her appt with Pulmonology so they can evaluate her more closely, given her hx of Interstitial lung disease as well as lung nodule. "     ROS:  Comprehensive Review of Systems Negative except stated in HPI.     Past Medical History:   Diagnosis Date     Anxiety      Bronchitis with bronchospasm 6/11/2018     Depression      Diverticulitis      GERD (gastroesophageal reflux disease)      Hyperlipidemia      Hypertension      Osteoarthritis      Patient Active Problem List   Diagnosis     Osteoporosis     Obesity     Anxiety     Diverticulosis     Benign Essential Hypertension     Osteopenia     Dermatitis     Hyperlipidemia     Major depression     Lower Back Pain     Allergic Rhinitis     Localized Primary Osteoarthritis Of The Knee     Restless Legs Syndrome     GERD (gastroesophageal reflux disease)     Neuropathy (H)     Osteoarthritis, multiple sites     Diastolic dysfunction     Hypoxia     Chronic obstructive pulmonary disease with acute exacerbation (H)     Current Outpatient Medications   Medication Sig Dispense Refill     albuterol (PROVENTIL) 2.5 mg /3 mL (0.083 %) nebulizer solution Take 3 mL (2.5 mg total) by nebulization every 6 (six) hours as needed for wheezing. 75 vial 3     aspirin 81 MG EC tablet Take 81 mg by mouth daily.              calcium, as carbonate, (OS-JACKIE) 500 mg calcium (1,250 mg) tablet Take 1 tablet by mouth daily.       cetirizine-pseudoephedrine (ALLERGY RELIEF-D, CETIRIZINE,) 5-120 mg per tablet Take 1 tablet by mouth daily. 90 tablet 3     cholecalciferol, vitamin D3, (VITAMIN D3) 2,000 unit Tab Take 1 tablet by mouth daily.       FLUoxetine (PROZAC) 20 MG capsule Take 2 capsules (40 mg total) by mouth daily. 180 capsule 0     fluticasone propionate (FLONASE) 50 mcg/actuation nasal spray Apply 1 spray into each nostril 2 (two) times a day.       furosemide (LASIX) 20 MG tablet Take 2 tablets (40 mg total) by mouth daily. 60 tablet 2     GLUCOSAMINE SULFATE (GLUCOSAMINE ORAL) Take 1 capsule by mouth daily.              hypromellose (ARTIFICIAL TEARS,[id number],) Drop Administer 1 drop to both eyes 4 (four)  times a day as needed (dry eyes). 30 mL 11     LACTOBACILLUS ACIDOPHILUS (PROBIOTIC ORAL) Take by mouth daily.       levalbuterol (XOPENEX) 1.25 mg/3 mL nebulizer solution Take 3 mL every 4 hours as needed for Reactive Airway disease. 75 mL 6     lisinopril-hydrochlorothiazide (PRINZIDE,ZESTORETIC) 20-12.5 mg per tablet TAKE 1 TABLET BY MOUTH EVERY DAY 90 tablet 2     meloxicam (MOBIC) 15 MG tablet Take 1 tablet (15 mg total) by mouth daily. 90 tablet 1     nebulizer and compressor ([employer] AEROSOL DELIVERY SYSTEM) Kristina 1 nebulization every 6 hours as needed for wheezing. 1 each 0     omeprazole (PRILOSEC) 20 MG capsule TAKE ONE CAPSULE BY MOUTH ONE TIME DAILY 90 capsule 3     oxybutynin (DITROPAN XL) 5 MG ER tablet Take 1 tablet (5 mg total) by mouth daily. 30 tablet 11     [START ON 5/18/2019] predniSONE (DELTASONE) 10 mg tablet Take 10 mg by mouth daily with breakfast. 3 tablet 0     predniSONE (DELTASONE) 5 MG tablet Take 15 mg by mouth daily with breakfast. 3 tablet 0     [START ON 5/21/2019] predniSONE (DELTASONE) 5 MG tablet Take 5 mg by mouth daily with breakfast. 3 tablet 0     pregabalin (LYRICA) 75 MG capsule Take 1 capsule (75 mg total) by mouth 2 (two) times a day. 60 capsule 3     traZODone (DESYREL) 150 MG tablet Take 1 tablet (150 mg total) by mouth at bedtime. 90 tablet 3     VENTOLIN HFA 90 mcg/actuation inhaler INHALE 1-2 PUFFS EVERY FOUR HOURS AS NEEDED FOR WHEEZING, SHORTNESS OF BREATH, OR COUGH. 18 Inhaler 2     acetaminophen (TYLENOL ARTHRITIS) 650 MG CR tablet Take 650 mg by mouth 4 (four) times a day as needed for pain.       FOLIC ACID/MULTIVITS-MIN/LUT (CENTRUM SILVER ORAL) Take 1 tablet by mouth daily.              HYDROcodone-acetaminophen 5-325 mg per tablet TAKE ONE TABLET BY MOUTH TWICE DAILY AS NEEDED FOR PAIN 60 tablet 0     No current facility-administered medications for this visit.      Social History     Tobacco Use   Smoking Status Former Smoker     Packs/day: 0.50     Years:  "25.00     Pack years: 12.50   Smokeless Tobacco Never Used   Tobacco Comment    quit 25 yrs ago     OBJECTIVE: /50   Pulse (!) 56   Temp 98.4  F (36.9  C) (Oral)   Resp 28   Ht 5' 1\" (1.549 m)   Wt 183 lb (83 kg)   SpO2 95%   BMI 34.58 kg/m     No results found for this or any previous visit (from the past 24 hour(s)).    PHYSICAL:  General Alert, awake, not in acute distress.   HEENT:             -Head Atraumatic, normocephalic.            -Eyes PERRL, no erythema, discharge, conjunctiva clear.             -Ears TMs intact, no drainage, no erythema or edema.            -Nose    Nostrils patent,  no edema. No Sinus tenderness.             -Throat Oropharynx without edema, erythema.  Uvula midline, no deviation.             -Neck Neck FROM, no adenopathy.  Thyroid not visibly enlarged.   CV Normal S1 & S2. No murmurs.   RESP Non-labored, RRR, CTAB. No wheezes or crackles.        Ori Hill PA-C         "

## 2021-05-29 ENCOUNTER — RECORDS - HEALTHEAST (OUTPATIENT)
Dept: ADMINISTRATIVE | Facility: CLINIC | Age: 81
End: 2021-05-29

## 2021-05-29 NOTE — TELEPHONE ENCOUNTER
RN cannot approve Refill Request    RN can NOT refill this medication Please provide an associated dx   . Last office visit: 5/21/2019 Agustín Craft MD Last Physical: 11/1/2017 Last MTM visit: Visit date not found Last visit same specialty: 5/21/2019 Agustín Craft MD.  Next visit within 3 mo: Visit date not found  Next physical within 3 mo: Visit date not found      Rick Minor [facility] Connection Triage/Med Refill 6/16/2019    Requested Prescriptions   Pending Prescriptions Disp Refills     traZODone (DESYREL) 150 MG tablet [Pharmacy Med Name: TRAZODONE 150 MG TABLET] 90 tablet 3     Sig: TAKE 1 TABLET (150 MG TOTAL) BY MOUTH AT BEDTIME.       Tricyclics/Misc Antidepressant/Antianxiety Meds Refill Protocol Passed - 6/14/2019  1:49 AM        Passed - PCP or prescribing provider visit in last year     Last office visit with prescriber/PCP: 5/21/2019 Agustín Craft MD OR same dept: 5/21/2019 Agustín Craft MD OR same specialty: 5/21/2019 Agustín Craft MD  Last physical: 11/1/2017 Last MTM visit: Visit date not found   Next visit within 3 mo: Visit date not found  Next physical within 3 mo: Visit date not found  Prescriber OR PCP: Agustín Craft MD  Last diagnosis associated with med order: There are no diagnoses linked to this encounter.  If protocol passes may refill for 12 months if within 3 months of last provider visit (or a total of 15 months).

## 2021-05-29 NOTE — TELEPHONE ENCOUNTER
RN cannot approve Refill Request    RN can NOT refill this medication med is not covered by policy/route to provider. Last office visit: 5/21/2019 Agustín Craft MD Last Physical: 11/1/2017 Last MTM visit: Visit date not found Last visit same specialty: 5/21/2019 Agustín Craft MD.  Next visit within 3 mo: Visit date not found  Next physical within 3 mo: Visit date not found      Yovana Anderson, [facility] Triage/Med Refill 6/9/2019    Requested Prescriptions   Pending Prescriptions Disp Refills     meloxicam (MOBIC) 15 MG tablet [Pharmacy Med Name: MELOXICAM 15 MG TABLET] 90 tablet 1     Sig: TAKE 1 TABLET BY MOUTH EVERY DAY       There is no refill protocol information for this order

## 2021-05-29 NOTE — PROGRESS NOTES
ASSESMENT AND PLAN:  Diagnoses and all orders for this visit:    Pulmonary fibrosis (H), Chronic obstructive pulmonary disease with acute exacerbation (H)  Reviewed his CT scan results with the patient, counseled her on the importance of follow-up with pulmonary medicine which is Serge been scheduled.  She is in agreement with the plan.    Diastolic dysfunction  Good response to furosemide, she will continue this medication daily.  We discussed indications for follow-up.    Neuropathy (H)  She has had some worsening of her pain control with the dose reduction of her Lyrica.  Counseled extensively on the risks of the use of Lyrica along with her hydrocodone but the patient does not feel like she can make any further reductions in her pain control medications at this time.    Major depressive disorder in partial remission, unspecified whether recurrent (H)  Some recent exacerbation as detailed below.  We discussed options.  Counseling done with the patient.  We will also increase the fluoxetine as ordered below.  Recheck here in the clinic in 4 to 6 weeks, sooner if problems.  Reviewed indications for emergent evaluation.  -     FLUoxetine (PROZAC) 20 MG capsule; Take 3 capsules (60 mg total) by mouth daily.  Dispense: 180 capsule; Refill: 3          SUBJECTIVE: 79-year-old female comes in today with some concerns about reduced level of pain control for her severe neuropathy symptoms and chronic pain.  We made a reduction in her Lyrica dose to try to get her to a safer overall medication regiment, please see previous clinic notes for details.  Patient had also been in at the hospital recently for shortness of breath.  Prior to that she had some shortness of breath that had improved significantly with the initiation of furosemide after she was found to have diastolic dysfunction.  She continues to take the furosemide and has not been having any worsening ankle edema.  During her hospital evaluation she was thought to  have a combination of COPD and pulmonary fibrosis, she did have findings consistent with pulmonary fibrosis on her CT scan.  Since discharge, she does acknowledge some good improvement in her overall level of shortness of breath but it still persisting and concerning to her.  She does not feel shortness of breath while sitting at rest.  But she does get shortness of breath with light physical exertion.  She has to do a lot of mental and physical work in helping to be the primary person taking care of her  who has advancing dementia.  This is stressful for her and she has noticed some slowly worsening symptoms of loss of motivation and enjoyment in her daily life as well as increasing depressed mood.    Past Medical History:   Diagnosis Date     Anxiety      Bronchitis with bronchospasm 6/11/2018     Depression      Diverticulitis      GERD (gastroesophageal reflux disease)      Hyperlipidemia      Hypertension      Osteoarthritis      Patient Active Problem List   Diagnosis     Osteoporosis     Obesity     Anxiety     Diverticulosis     Benign Essential Hypertension     Osteopenia     Dermatitis     Hyperlipidemia     Major depression     Lower Back Pain     Allergic Rhinitis     Localized Primary Osteoarthritis Of The Knee     Restless Legs Syndrome     GERD (gastroesophageal reflux disease)     Neuropathy (H)     Osteoarthritis, multiple sites     Diastolic dysfunction     Hypoxia     Chronic obstructive pulmonary disease with acute exacerbation (H)     Pulmonary fibrosis (H)     Current Outpatient Medications   Medication Sig Dispense Refill     acetaminophen (TYLENOL ARTHRITIS) 650 MG CR tablet Take 650 mg by mouth 4 (four) times a day as needed for pain.       albuterol (PROVENTIL) 2.5 mg /3 mL (0.083 %) nebulizer solution Take 3 mL (2.5 mg total) by nebulization every 6 (six) hours as needed for wheezing. 75 vial 3     aspirin 81 MG EC tablet Take 81 mg by mouth daily.              calcium, as  carbonate, (OS-JACKIE) 500 mg calcium (1,250 mg) tablet Take 1 tablet by mouth daily.       cholecalciferol, vitamin D3, (VITAMIN D3) 2,000 unit Tab Take 1 tablet by mouth daily.       fexofenadine (ALLEGRA) 180 MG tablet Take 180 mg by mouth daily.       FLUoxetine (PROZAC) 20 MG capsule Take 3 capsules (60 mg total) by mouth daily. 180 capsule 3     fluticasone propionate (FLONASE) 50 mcg/actuation nasal spray Apply 1 spray into each nostril 2 (two) times a day.       furosemide (LASIX) 20 MG tablet Take 2 tablets (40 mg total) by mouth daily. 60 tablet 2     GLUCOSAMINE SULFATE (GLUCOSAMINE ORAL) Take 1 capsule by mouth daily.              HYDROcodone-acetaminophen 5-325 mg per tablet TAKE ONE TABLET BY MOUTH TWICE DAILY AS NEEDED FOR PAIN 60 tablet 0     hypromellose (ARTIFICIAL TEARS,[id number],) Drop Administer 1 drop to both eyes 4 (four) times a day as needed (dry eyes). 30 mL 11     LACTOBACILLUS ACIDOPHILUS (PROBIOTIC ORAL) Take by mouth daily.       levalbuterol (XOPENEX) 1.25 mg/3 mL nebulizer solution Take 3 mL every 4 hours as needed for Reactive Airway disease. 75 mL 6     lisinopril-hydrochlorothiazide (PRINZIDE,ZESTORETIC) 20-12.5 mg per tablet TAKE 1 TABLET BY MOUTH EVERY DAY 90 tablet 2     meloxicam (MOBIC) 15 MG tablet Take 1 tablet (15 mg total) by mouth daily. 90 tablet 1     nebulizer and compressor ([employer] AEROSOL DELIVERY SYSTEM) Kristina 1 nebulization every 6 hours as needed for wheezing. 1 each 0     omeprazole (PRILOSEC) 20 MG capsule TAKE ONE CAPSULE BY MOUTH ONE TIME DAILY 90 capsule 3     oxybutynin (DITROPAN XL) 5 MG ER tablet Take 1 tablet (5 mg total) by mouth daily. 30 tablet 11     pregabalin (LYRICA) 75 MG capsule Take 1 capsule (75 mg total) by mouth 2 (two) times a day. 60 capsule 3     traZODone (DESYREL) 150 MG tablet Take 1 tablet (150 mg total) by mouth at bedtime. 90 tablet 3     VENTOLIN HFA 90 mcg/actuation inhaler INHALE 1-2 PUFFS EVERY FOUR HOURS AS NEEDED FOR WHEEZING,  "SHORTNESS OF BREATH, OR COUGH. 18 Inhaler 2     predniSONE (DELTASONE) 10 mg tablet Take 10 mg by mouth daily with breakfast. 3 tablet 0     predniSONE (DELTASONE) 5 MG tablet Take 5 mg by mouth daily with breakfast. 3 tablet 0     No current facility-administered medications for this visit.      Social History     Tobacco Use   Smoking Status Former Smoker     Packs/day: 0.50     Years: 25.00     Pack years: 12.50   Smokeless Tobacco Never Used   Tobacco Comment    quit 25 yrs ago       OBJECTICE: /48 (Patient Site: Right Arm, Patient Position: Sitting, Cuff Size: Adult Regular)   Pulse 80   Temp 97.5  F (36.4  C) (Oral)   Resp 20   Ht 5' 1\" (1.549 m)   Wt 183 lb (83 kg)   SpO2 93%   BMI 34.58 kg/m       No results found for this or any previous visit (from the past 24 hour(s)).     GEN-alert, appropriate, in no apparent distress   HEENT-neck is supple, mucous memories are moist   CV-regular rate and rhythm   RESP-lungs clear to auscultation   ABDOMINAL-soft and nontender to palpation   EXTREM-warm with no pitting edema   Psychiatric-appearance is well-groomed, speech of normal fluency and rate, mood is depressed, affect is flatter than her usual, thought content negative for suicidal or homicidal ideation, thought processing negative for paranoid or delusional thinking.      Agustín Craft          "

## 2021-05-30 ENCOUNTER — RECORDS - HEALTHEAST (OUTPATIENT)
Dept: ADMINISTRATIVE | Facility: CLINIC | Age: 81
End: 2021-05-30

## 2021-05-30 VITALS — WEIGHT: 188.5 LBS | BODY MASS INDEX: 34.69 KG/M2 | HEIGHT: 62 IN

## 2021-05-30 VITALS — WEIGHT: 191 LBS | HEIGHT: 62 IN | BODY MASS INDEX: 35.15 KG/M2

## 2021-05-30 VITALS — HEIGHT: 62 IN | WEIGHT: 188.08 LBS | BODY MASS INDEX: 34.61 KG/M2

## 2021-05-30 VITALS — WEIGHT: 189 LBS | BODY MASS INDEX: 34.78 KG/M2 | HEIGHT: 62 IN

## 2021-05-30 NOTE — TELEPHONE ENCOUNTER
Refill Approved    Rx renewed per Medication Renewal Policy. Medication was last renewed on 4/24/19.    Alena Jeffers, [facility] Triage/Med Refill 7/30/2019     Requested Prescriptions   Pending Prescriptions Disp Refills     furosemide (LASIX) 20 MG tablet 60 tablet 2     Sig: Take 2 tablets (40 mg total) by mouth daily.       Diuretics/Combination Diuretics Refill Protocol  Passed - 7/30/2019  3:25 PM        Passed - Visit with PCP or prescribing provider visit in past 12 months     Last office visit with prescriber/PCP: 7/1/2019 Agustín Craft MD OR same dept: 7/1/2019 Agustín Craft MD OR same specialty: 7/1/2019 Agustín Craft MD  Last physical: 11/1/2017 Last MTM visit: Visit date not found   Next visit within 3 mo: Visit date not found  Next physical within 3 mo: Visit date not found  Prescriber OR PCP: Agustín Craft MD  Last diagnosis associated with med order: 1. Acute congestive heart failure, unspecified heart failure type (H)  - furosemide (LASIX) 20 MG tablet; Take 2 tablets (40 mg total) by mouth daily.  Dispense: 60 tablet; Refill: 2    If protocol passes may refill for 12 months if within 3 months of last provider visit (or a total of 15 months).             Passed - Serum Potassium in past 12 months      Lab Results   Component Value Date    Potassium 4.8 05/11/2019             Passed - Serum Sodium in past 12 months      Lab Results   Component Value Date    Sodium 136 05/11/2019             Passed - Blood pressure on file in past 12 months     BP Readings from Last 1 Encounters:   07/01/19 114/58             Passed - Serum Creatinine in past 12 months      Creatinine   Date Value Ref Range Status   05/11/2019 1.26 (H) 0.60 - 1.10 mg/dL Final

## 2021-05-30 NOTE — TELEPHONE ENCOUNTER
Controlled Substance Refill Request  Medication Name:   Requested Prescriptions     Pending Prescriptions Disp Refills     HYDROcodone-acetaminophen 5-325 mg per tablet 60 tablet 0     Sig: TAKE ONE TABLET BY MOUTH TWICE DAILY AS NEEDED FOR PAIN     Date Last Fill: 07/01/2019  Pharmacy: [facility] [place]      Submit electronically to pharmacy  Controlled Substance Agreement Date Scanned:   Encounter-Level CSA Scan Date:    There are no encounter-level csa scan date.       Last office visit with prescriber/PCP: 7/1/2019 Agustín Craft MD OR same dept: 7/1/2019 Agustín Craft MD OR same specialty: 7/1/2019 Agustín Craft MD  Last physical: 11/1/2017 Last MTM visit: Visit date not found

## 2021-05-30 NOTE — PROGRESS NOTES
"Pulmonary Clinic Consult Note  Date of Service: 6/25/2019    Reason for Consultation: ILD    History:     HPI: Patient is a pleasant 79-year-old female who was referred here for evaluation of questionable COPD.  Patient had previously been followed up with my colleague Dr. Russo.    Patient notes that she has seen DR Russo in the past and was told she may have scarring.  She has an albuterol inhaler and albuterol nebulizer.   She notes that she is able to walk 1 block before she gets short of breath.  She is able to go up 1-2 flights of stairs before she has to rest.  She denies any chronic cough.  She admits to occasional wheezing.  She denies any weight loss or night sweats.  She denies any lumps or bumps.  She denies frequent exacerbations requiring abx. She notes that she was only hospitalized only in 5/2019 with hypoixa and treated with abx/steroids then.  During her hospitalization, patient had a CT scan of the chest that was negative for PE but showed chronic interstitial lung disease which have progressed from previously.  The pattern was not suggestive of UIP.  She did have small pulmonary nodules, largest being 4 mm.    PMHx/PSHx:  H/o ? ILD  Seasonal allergies  GERD  Hypertension  Hyperlipidemia  Anxiety  Osteoarthritis  Total knee arthroplasty    Social Hx:  , former smoker.  Used to smoke half a pack per day for approximately 25 years.  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   Lives in a house  No birds    Review of Systems - 10 point review of system negative except for what is mentioned in the HPI.    Meds and Allergies:  See EHR for the updated medication list and Allergies. These were reviewed.       Family History   Problem Relation Age of Onset     Heart attack Mother        Exam/Data:   /54 (Patient Site: Left Arm, Patient Position: Sitting, Cuff Size: Adult Regular)   Pulse 73   Resp 18   Ht 5' 1\" (1.549 m)   Wt 186 lb 12.8 oz (84.7 kg)  "  SpO2 94%   BMI 35.30 kg/m      EXAM:  GEN: comfortable, NAD  HEENT: NCAT, EMOI, mmm  LN: no cervical LAD   CVS: S1S2, RRR  Lung: good air entry bilaterally, no wheezing  Abd: soft, nt, + BS. No masses  Ext: no c/c/e  Vasc: intact radial pulses bilaterally  Neuro: nonfocal  Skin: no visible rash  Musculoskeletal: FROM all extremities  Psych: normal affect    DATA      PFT DATA 6/2019:  FEV1/FVC is 78 and is normal.  FEV1 is 75% predicted and is reduced.  FVC is 74% predicted and reduced.  There was no improvement in spirometry after a single inhaled does of bronchodilator.  TLC is 84% predicted and is normal.  RV is 98% predicted and is normal.  DLCO is 34% predicted and is reduced when it is corrected for hemoglobin.     Impression:  Pulmonary Function Test is abnormal.    Spirometry demonstrates a non-specific pattern of a normal FEV1/FVC ratio with reduced FEV1 and FVC, and is not consistent with reversibility.  Lung volumes are normal.  There is an isolated, severe reduction in diffusing capacity  Flow volume loops are normal appearing    CT chest on 4/2017:  CONCLUSION:  1.  Minimal interlobular septal thickening throughout both lungs from apex to base may represent minimal interstitial fibrosis but is not consistent of usual interstitial pneumonitis.  2.  More focal linear opacities in the anteromedial aspect left upper lobe could represent sequelae to prior pneumonitis.  3.  Some degree of dynamic airway collapse as described above at expiratory imaging.  4.  Probably benign 3 mm right lower lobe nodule.  5.  Mild three-vessel coronary artery calcification.  6.  Hepatic steatosis.  7.  Osteoporosis.       CT chest on 5/2019:  CONCLUSION:  1.  No evidence pulmonary embolism.  2.  Coronary atherosclerosis.  3.  Chronic interstitial lung disease, progressed from previous. The pattern is not suggestive of UIP.  4.  Slight interval enlargement right upper lobe 4 mm subpleural nodule not likely significant given  the two-year hiatus between exams.      Assessment/Plan:   Sara Ashton is a 79 y.o. female, with distant history of tobacco use, who was referred here for evaluation of shortness of breath.  CTA of the chest was reviewed and shows mild progression of her underlying ILD.  When she was seen back in 2017 by my colleague, high-resolution CT scan showed minimal interlobular septal thickening with minimal fibrosis.  There was some degree of dynamic airway collapse.  PFTs as noted above show marked decrease in her DLCO at 34%.  DLCO back in 2017 was in the mid 60s.  Her lung volumes are preserved.  I suspect that patient may have mild underlying fibrosis and possibly a component of COPD given the marked decrease in her DLCO and relative preservation of her lung volumes.    Recommendations:  Would like to repeat her PFTs in 6 months  Consider repeat high-resolution CT scan of the chest if PFTs confirm low DLCO  Albuterol inhaler and albuterol nebulizer as needed    FOLLOW UP: 6 months    Nida Morin MD  Pulmonary and Critical Care Medicine  6/25/2019        No Known Allergies    Medications:     Current Outpatient Medications   Medication Sig Dispense Refill     acetaminophen (TYLENOL ARTHRITIS) 650 MG CR tablet Take 650 mg by mouth 4 (four) times a day as needed for pain.       albuterol (PROVENTIL) 2.5 mg /3 mL (0.083 %) nebulizer solution Take 3 mL (2.5 mg total) by nebulization every 6 (six) hours as needed for wheezing. 75 vial 3     aspirin 81 MG EC tablet Take 81 mg by mouth daily.              calcium, as carbonate, (OS-JACKIE) 500 mg calcium (1,250 mg) tablet Take 1 tablet by mouth daily.       cholecalciferol, vitamin D3, (VITAMIN D3) 2,000 unit Tab Take 1 tablet by mouth daily.       fexofenadine (ALLEGRA) 180 MG tablet Take 180 mg by mouth daily.       FLUoxetine (PROZAC) 20 MG capsule Take 3 capsules (60 mg total) by mouth daily. 180 capsule 3     fluticasone propionate (FLONASE) 50 mcg/actuation nasal spray  Apply 1 spray into each nostril 2 (two) times a day.       furosemide (LASIX) 20 MG tablet Take 2 tablets (40 mg total) by mouth daily. 60 tablet 2     GLUCOSAMINE SULFATE (GLUCOSAMINE ORAL) Take 1 capsule by mouth daily.              HYDROcodone-acetaminophen 5-325 mg per tablet TAKE ONE TABLET BY MOUTH TWICE DAILY AS NEEDED FOR PAIN 60 tablet 0     hypromellose (ARTIFICIAL TEARS,[id number],) Drop Administer 1 drop to both eyes 4 (four) times a day as needed (dry eyes). 30 mL 11     LACTOBACILLUS ACIDOPHILUS (PROBIOTIC ORAL) Take by mouth daily.       levalbuterol (XOPENEX) 1.25 mg/3 mL nebulizer solution Take 3 mL every 4 hours as needed for Reactive Airway disease. 75 mL 6     lisinopril-hydrochlorothiazide (PRINZIDE,ZESTORETIC) 20-12.5 mg per tablet TAKE 1 TABLET BY MOUTH EVERY DAY 90 tablet 2     meloxicam (MOBIC) 15 MG tablet TAKE 1 TABLET BY MOUTH EVERY DAY 90 tablet 1     nebulizer and compressor ([employer] AEROSOL DELIVERY SYSTEM) Kristina 1 nebulization every 6 hours as needed for wheezing. 1 each 0     omeprazole (PRILOSEC) 20 MG capsule TAKE ONE CAPSULE BY MOUTH ONE TIME DAILY 90 capsule 3     oxybutynin (DITROPAN XL) 5 MG ER tablet Take 1 tablet (5 mg total) by mouth daily. 30 tablet 11     pregabalin (LYRICA) 75 MG capsule Take 1 capsule (75 mg total) by mouth 2 (two) times a day. 60 capsule 3     traZODone (DESYREL) 150 MG tablet TAKE 1 TABLET (150 MG TOTAL) BY MOUTH AT BEDTIME. 90 tablet 3     VENTOLIN HFA 90 mcg/actuation inhaler INHALE 1-2 PUFFS EVERY FOUR HOURS AS NEEDED FOR WHEEZING, SHORTNESS OF BREATH, OR COUGH. 18 Inhaler 2     No current facility-administered medications for this visit.        Much or all of the text in this note was generated through the use of the Dragon Dictate voice-to-text software. Errors in spelling or words which seem out of context are unintentional. Sound alike errors, in particular, may have escaped editing.

## 2021-05-30 NOTE — PROGRESS NOTES
ASSESMENT AND PLAN:  Diagnoses and all orders for this visit:    Pulmonary fibrosis (H)  Reviewed the most recent pulmonary medicine consultation with the patient.  Counseled her on pulmonary fibrosis and the possibility of a slight element of COPD.  The plan is for reassessment at pulmonary clinic in 6 months and the patient is in agreement with that plan.  Follow-up sooner if recurrence of symptoms or problems.    Neuropathy  Refill-     HYDROcodone-acetaminophen 5-325 mg per tablet; TAKE ONE TABLET BY MOUTH TWICE DAILY AS NEEDED FOR PAIN  Dispense: 60 tablet; Refill: 0    Major depressive disorder in partial remission, unspecified whether recurrent (H)  Counseling done today with the patient.  She is had a good response to the increased dose of fluoxetine which will be continued at her current dose until reevaluation in 6 months as detailed above, at that time if she is continuing to do quite well we could consider cutting back by 20 mg/day.        SUBJECTIVE: 79-year-old female here for follow-up on her lung disease.  She had multiple episodes of significant shortness of breath, please see previous clinic and hospital notes for details.  Shortness of breath is now resolved.  She was treated with diuresis given her diastolic dysfunction.  She also saw pulmonary medicine and was taken mild pulmonary fibrosis, possible element of COPD.  Patient reports ongoing issues with neuropathy pain, especially in the right foot and lower leg.  She describes a burning pain intermittently in the foot and leg, it seems to originate in the foot and then move up the leg to about the knee.  The pain is usually mild to moderate but at times can become more severe, if so at bedtime it will disrupt her ability to fall asleep.  This is when she will sometimes take her hydrocodone acetaminophen, she is been using it as sparingly as possible, her last refill was about 6 weeks ago.  Provides her good relief of the pain.  The patient feels  like her mood has been improving since I saw her last time and her fluoxetine level was increased by prescription.  She is tolerated the higher dose of fluoxetine well.  She is noticed some improvement in her outlook and mood and increased ability to manage her daily stressors, especially around the caregiving for her  who has dementia.  She feels like she is overall managing well.    Past Medical History:   Diagnosis Date     Anxiety      Bronchitis with bronchospasm 6/11/2018     Depression      Diverticulitis      GERD (gastroesophageal reflux disease)      Hyperlipidemia      Hypertension      Osteoarthritis      Patient Active Problem List   Diagnosis     Osteoporosis     Obesity     Anxiety     Diverticulosis     Benign Essential Hypertension     Osteopenia     Dermatitis     Hyperlipidemia     Major depression     Lower Back Pain     Allergic Rhinitis     Localized Primary Osteoarthritis Of The Knee     Restless Legs Syndrome     GERD (gastroesophageal reflux disease)     Neuropathy     Osteoarthritis, multiple sites     Diastolic dysfunction     Hypoxia     Chronic obstructive pulmonary disease with acute exacerbation (H)     Pulmonary fibrosis (H)     Current Outpatient Medications   Medication Sig Dispense Refill     acetaminophen (TYLENOL ARTHRITIS) 650 MG CR tablet Take 650 mg by mouth 4 (four) times a day as needed for pain.       albuterol (PROVENTIL) 2.5 mg /3 mL (0.083 %) nebulizer solution Take 3 mL (2.5 mg total) by nebulization every 6 (six) hours as needed for wheezing. 75 vial 3     aspirin 81 MG EC tablet Take 81 mg by mouth daily.              calcium, as carbonate, (OS-JACKIE) 500 mg calcium (1,250 mg) tablet Take 1 tablet by mouth daily.       cholecalciferol, vitamin D3, (VITAMIN D3) 2,000 unit Tab Take 1 tablet by mouth daily.       fexofenadine (ALLEGRA) 180 MG tablet Take 180 mg by mouth daily.       FLUoxetine (PROZAC) 20 MG capsule Take 3 capsules (60 mg total) by mouth daily. 180  capsule 3     fluticasone propionate (FLONASE) 50 mcg/actuation nasal spray Apply 1 spray into each nostril 2 (two) times a day.       furosemide (LASIX) 20 MG tablet Take 2 tablets (40 mg total) by mouth daily. 60 tablet 2     GLUCOSAMINE SULFATE (GLUCOSAMINE ORAL) Take 1 capsule by mouth daily.              HYDROcodone-acetaminophen 5-325 mg per tablet TAKE ONE TABLET BY MOUTH TWICE DAILY AS NEEDED FOR PAIN 60 tablet 0     hypromellose (ARTIFICIAL TEARS,[id number],) Drop Administer 1 drop to both eyes 4 (four) times a day as needed (dry eyes). 30 mL 11     LACTOBACILLUS ACIDOPHILUS (PROBIOTIC ORAL) Take by mouth daily.       levalbuterol (XOPENEX) 1.25 mg/3 mL nebulizer solution Take 3 mL every 4 hours as needed for Reactive Airway disease. 75 mL 6     lisinopril-hydrochlorothiazide (PRINZIDE,ZESTORETIC) 20-12.5 mg per tablet TAKE 1 TABLET BY MOUTH EVERY DAY 90 tablet 2     meloxicam (MOBIC) 15 MG tablet TAKE 1 TABLET BY MOUTH EVERY DAY 90 tablet 1     nebulizer and compressor ([employer] AEROSOL DELIVERY SYSTEM) Kristina 1 nebulization every 6 hours as needed for wheezing. 1 each 0     omeprazole (PRILOSEC) 20 MG capsule TAKE ONE CAPSULE BY MOUTH ONE TIME DAILY 90 capsule 3     oxybutynin (DITROPAN XL) 5 MG ER tablet Take 1 tablet (5 mg total) by mouth daily. 30 tablet 11     pregabalin (LYRICA) 75 MG capsule Take 1 capsule (75 mg total) by mouth 2 (two) times a day. 60 capsule 3     traZODone (DESYREL) 150 MG tablet TAKE 1 TABLET (150 MG TOTAL) BY MOUTH AT BEDTIME. 90 tablet 3     VENTOLIN HFA 90 mcg/actuation inhaler INHALE 1-2 PUFFS EVERY FOUR HOURS AS NEEDED FOR WHEEZING, SHORTNESS OF BREATH, OR COUGH. 18 Inhaler 2     No current facility-administered medications for this visit.      Social History     Tobacco Use   Smoking Status Former Smoker     Packs/day: 0.50     Years: 25.00     Pack years: 12.50   Smokeless Tobacco Never Used   Tobacco Comment    quit 25 yrs ago       OBJECTICE: /58   Pulse 68   Temp  "97.8  F (36.6  C) (Oral)   Resp 12   Ht 5' 3\" (1.6 m) Comment: with shoes on  Wt 184 lb 8 oz (83.7 kg) Comment: with shoes on while holding the weight handles  SpO2 95%   BMI 32.68 kg/m       No results found for this or any previous visit (from the past 24 hour(s)).     GEN-alert, appropriate, in no apparent distress   CV-regular rate and rhythm with no murmur   RESP-fine bilateral crackles, mild.  No respiratory distress.   EXTREM-warm, no ankle edema   SKIN-no ulcers or vesicles overlying her area of pain   Psychiatric-appearance is well-groomed, speech of normal fluency and rate, mood is less depressed, affect is normal today, back to her usual self which is joking and laughing frequently.  Thought content negative for suicidal or homicidal ideation, thought processing negative for paranoid or delusional thinking.      Agustín Craft"

## 2021-05-30 NOTE — TELEPHONE ENCOUNTER
Controlled Substance Refill Request  Medication:   Requested Prescriptions     Pending Prescriptions Disp Refills     LYRICA 75 mg capsule [Pharmacy Med Name: LYRICA 75 MG CAPSULE] 60 capsule 3     Sig: TAKE 1 CAPSULE BY MOUTH TWICE A DAY     Date Last Fill: 3/20/19  [id number]   Submit electronically to pharmacy  Controlled Substance Agreement on File:   Encounter-Level CSA Scan Date:    There are no encounter-level csa scan date.       Last office visit: Last office visit pertaining to requested medication was 7/1/19.

## 2021-05-31 VITALS — WEIGHT: 195 LBS | BODY MASS INDEX: 35.88 KG/M2 | HEIGHT: 62 IN

## 2021-05-31 VITALS — WEIGHT: 193 LBS | HEIGHT: 62 IN | BODY MASS INDEX: 35.51 KG/M2

## 2021-05-31 VITALS — WEIGHT: 195.8 LBS | BODY MASS INDEX: 36.1 KG/M2

## 2021-05-31 NOTE — TELEPHONE ENCOUNTER
Refill Approved    Rx renewed per Medication Renewal Policy. Medication was last renewed on 6/25/18.    Alena Jeffers, [facility] Triage/Med Refill 8/15/2019     Requested Prescriptions   Pending Prescriptions Disp Refills     omeprazole (PRILOSEC) 20 MG capsule [Pharmacy Med Name: OMEPRAZOLE DR 20 MG CAPSULE] 90 capsule 3     Sig: TAKE 1 CAPSULE BY MOUTH EVERY DAY       GI Medications Refill Protocol Passed - 8/15/2019  1:29 AM        Passed - PCP or prescribing provider visit in last 12 or next 3 months.     Last office visit with prescriber/PCP: 7/1/2019 Agustín Craft MD OR same dept: 7/1/2019 Agustín Craft MD OR same specialty: 7/1/2019 Agustín Craft MD  Last physical: 11/1/2017 Last MTM visit: Visit date not found   Next visit within 3 mo: Visit date not found  Next physical within 3 mo: Visit date not found  Prescriber OR PCP: Agustín Craft MD  Last diagnosis associated with med order: 1. GERD (gastroesophageal reflux disease)  - omeprazole (PRILOSEC) 20 MG capsule [Pharmacy Med Name: OMEPRAZOLE DR 20 MG CAPSULE]; TAKE 1 CAPSULE BY MOUTH EVERY DAY  Dispense: 90 capsule; Refill: 3    If protocol passes may refill for 12 months if within 3 months of last provider visit (or a total of 15 months).

## 2021-05-31 NOTE — TELEPHONE ENCOUNTER
Controlled Substance Refill Request  Medication Name:   Requested Prescriptions     Pending Prescriptions Disp Refills     HYDROcodone-acetaminophen 5-325 mg per tablet 60 tablet 0     Sig: TAKE ONE TABLET BY MOUTH TWICE DAILY AS NEEDED FOR PAIN     Date Last Fill: 7/29/19  [facility]    Submit electronically to pharmacy  Controlled Substance Agreement Date Scanned:   Encounter-Level CSA Scan Date:    There are no encounter-level csa scan date.       Last office visit with prescriber/PCP: 7/1/2019 Agustín Craft MD OR same dept: 7/1/2019 Agusítn Craft MD OR same specialty: 7/1/2019 Agustín Craft MD  Last physical: 11/1/2017 Last MTM visit: Visit date not found

## 2021-06-01 ENCOUNTER — RECORDS - HEALTHEAST (OUTPATIENT)
Dept: ADMINISTRATIVE | Facility: CLINIC | Age: 81
End: 2021-06-01

## 2021-06-01 VITALS — HEIGHT: 61 IN | WEIGHT: 193.75 LBS | BODY MASS INDEX: 36.58 KG/M2

## 2021-06-01 VITALS — HEIGHT: 62 IN | BODY MASS INDEX: 36.07 KG/M2 | WEIGHT: 196 LBS

## 2021-06-01 VITALS — HEIGHT: 62 IN | BODY MASS INDEX: 35.1 KG/M2 | WEIGHT: 190.75 LBS

## 2021-06-01 VITALS — HEIGHT: 62 IN | WEIGHT: 190 LBS | BODY MASS INDEX: 34.96 KG/M2

## 2021-06-01 VITALS — BODY MASS INDEX: 35.9 KG/M2 | WEIGHT: 190 LBS

## 2021-06-01 NOTE — TELEPHONE ENCOUNTER
Controlled Substance Refill Request  Medication Name:   Requested Prescriptions     Pending Prescriptions Disp Refills     HYDROcodone-acetaminophen 5-325 mg per tablet 60 tablet 0     Sig: TAKE ONE TABLET BY MOUTH TWICE DAILY AS NEEDED FOR PAIN     Date Last Fill: 08/29/19  Ph[id number] [address]     Submit electronically to pharmacy  Controlled Substance Agreement Date Scanned:   Encounter-Level CSA Scan Date:    There are no encounter-level csa scan date.       Last office visit with prescriber/PCP: 7/1/2019 Agustín Craft MD OR same dept: 7/1/2019 Agustín Craft MD OR same specialty: 7/1/2019 Agustín Craft MD  Last physical: 11/1/2017 Last MTM visit: Visit date not found

## 2021-06-01 NOTE — TELEPHONE ENCOUNTER
The patient saw PCP in July. Provider noted that patient has chronic issues with neuropathy of the right foot and low aspect of leg. Patient taking pain medication when unable to fall asleep due to pain, using sparingly without concern from provider/patient.

## 2021-06-01 NOTE — TELEPHONE ENCOUNTER
Unable to reach today x 2 attempts for MTM visit.     Bee Brewster, PharmD  Medication Therapy Management Pharmacist  [facility]

## 2021-06-02 VITALS — HEIGHT: 61 IN | BODY MASS INDEX: 36.28 KG/M2

## 2021-06-02 VITALS — BODY MASS INDEX: 36.06 KG/M2 | HEIGHT: 61 IN | WEIGHT: 191 LBS

## 2021-06-02 VITALS — HEIGHT: 61 IN | BODY MASS INDEX: 36.25 KG/M2 | WEIGHT: 192 LBS

## 2021-06-02 VITALS — BODY MASS INDEX: 36.28 KG/M2 | HEIGHT: 61 IN

## 2021-06-02 VITALS — WEIGHT: 192 LBS | BODY MASS INDEX: 36.25 KG/M2 | HEIGHT: 61 IN

## 2021-06-02 VITALS — BODY MASS INDEX: 35.9 KG/M2 | WEIGHT: 190 LBS

## 2021-06-02 NOTE — TELEPHONE ENCOUNTER
Neuropathy  Refill-     HYDROcodone-acetaminophen 5-325 mg per tablet; TAKE ONE TABLET BY MOUTH TWICE DAILY AS NEEDED FOR PAIN  Dispense: 60 tablet; Refill: 0  SUBJECTIVE  Patient reports ongoing issues with neuropathy pain, especially in the right foot and lower leg.  She describes a burning pain intermittently in the foot and leg, it seems to originate in the foot and then move up the leg to about the knee.  The pain is usually mild to moderate but at times can become more severe, if so at bedtime it will disrupt her ability to fall asleep.  This is when she will sometimes take her hydrocodone acetaminophen, she is been using it as sparingly as possible, her last refill was about 6 weeks ago.  Provides her good relief of the pain

## 2021-06-02 NOTE — TELEPHONE ENCOUNTER
Patient is  down to a few tablets. Please review.  Controlled Substance Refill Request  Medication Name:   Requested Prescriptions     Pending Prescriptions Disp Refills     HYDROcodone-acetaminophen 5-325 mg per tablet 60 tablet 0     Sig: TAKE ONE TABLET BY MOUTH TWICE DAILY AS NEEDED FOR chronic PAIN     Date Last Fill: 9/24/19  Pharmacy: [facility]        Submit electronically to pharmacy  Controlled Substance Agreement Date Scanned:   Encounter-Level CSA Scan Date:    There are no encounter-level csa scan date.       Last office visit with prescriber/PCP: 7/1/2019 Agustín Craft MD OR same dept: 7/1/2019 Agustín Craft MD OR same specialty: 7/1/2019 Agustín Craft MD  Last physical: 11/1/2017 Last MTM visit: Visit date not found

## 2021-06-02 NOTE — TELEPHONE ENCOUNTER
Patient Returning Call  Reason for call:  Caller returning call to check on the status of this request.  Information relayed to patient:  Pending providers review and approval.  Patient has additional questions:  yes  If YES, what are your questions/concerns:  Please call when this has been approved as my feet are burning  Okay to leave a detailed message?: Yes

## 2021-06-03 VITALS — WEIGHT: 183 LBS | BODY MASS INDEX: 34.55 KG/M2 | HEIGHT: 61 IN

## 2021-06-03 VITALS — HEIGHT: 61 IN | WEIGHT: 183 LBS | BODY MASS INDEX: 34.55 KG/M2

## 2021-06-03 VITALS — WEIGHT: 186.8 LBS | HEIGHT: 61 IN | BODY MASS INDEX: 35.27 KG/M2

## 2021-06-03 VITALS — BODY MASS INDEX: 32.69 KG/M2 | HEIGHT: 63 IN | WEIGHT: 184.5 LBS

## 2021-06-03 VITALS — BODY MASS INDEX: 35.6 KG/M2 | WEIGHT: 188.4 LBS

## 2021-06-03 NOTE — TELEPHONE ENCOUNTER
Controlled Substance Refill Request  Medication Name:   Requested Prescriptions     Pending Prescriptions Disp Refills     HYDROcodone-acetaminophen 5-325 mg per tablet 60 tablet 0     Sig: TAKE ONE TABLET BY MOUTH TWICE DAILY AS NEEDED FOR chronic PAIN     Date Last Fill: 10/23/19  Pharmacy: [facility])    Submit electronically to pharmacy  Controlled Substance Agreement Date Scanned:   Encounter-Level CSA Scan Date:    There are no encounter-level csa scan date.       Last office visit with prescriber/PCP: 7/1/2019 Agustín Craft MD OR same dept: 7/1/2019 Agustín Craft MD OR same specialty: 7/1/2019 Agustín Craft MD  Last physical: 11/1/2017 Last MTM visit: Visit date not found

## 2021-06-03 NOTE — TELEPHONE ENCOUNTER
RN cannot approve Refill Request    RN can NOT refill this medication historical medication requested.       Alena Jeffers, [facility] Triage/Med Refill 11/16/2019    Requested Prescriptions   Pending Prescriptions Disp Refills     fluticasone propionate (FLONASE) 50 mcg/actuation nasal spray [Pharmacy Med Name: FLUTICASONE PROP 50 MCG SPRAY]  3     Sig: INSTILL 2 SPRAYS IN EACH NOSTRIL ONE TIME DAILY       Nasal Steroid Refill Protocol Passed - 11/15/2019  5:56 PM        Passed - Patient has had office visit/physical in last 2 years     Last office visit with prescriber/PCP: Visit date not found OR same dept: 7/1/2019 Agustín Craft MD OR same specialty: 7/1/2019 Agustín Craft MD Last physical: Visit date not found Last MTM visit: Visit date not found    Next appt within 3 mo: Visit date not found  Next physical within 3 mo: Visit date not found  Prescriber OR PCP: Lula Jay MD  Last diagnosis associated with med order: 1. Allergic rhinitis  - fluticasone propionate (FLONASE) 50 mcg/actuation nasal spray [Pharmacy Med Name: FLUTICASONE PROP 50 MCG SPRAY]; INSTILL 2 SPRAYS IN EACH NOSTRIL ONE TIME DAILY; Refill: 3     If protocol passes may refill for 12 months if within 3 months of last provider visit (or a total of 15 months).

## 2021-06-03 NOTE — TELEPHONE ENCOUNTER
Refill Approved    Rx renewed per Medication Renewal Policy. Medication was last renewed on 3/14/2019 for 90/2.  Last OV 7/1/2019  Paulina Reyes, [facility] Triage/Med Refill 11/29/2019     Requested Prescriptions   Pending Prescriptions Disp Refills     lisinopril-hydrochlorothiazide (PRINZIDE,ZESTORETIC) 20-12.5 mg per tablet [Pharmacy Med Name: LISINOPRIL-HCTZ 20-12.5 MG TAB] 90 tablet 2     Sig: TAKE 1 TABLET BY MOUTH EVERY DAY       Diuretics/Combination Diuretics Refill Protocol  Passed - 11/27/2019  6:04 AM        Passed - Visit with PCP or prescribing provider visit in past 12 months     Last office visit with prescriber/PCP: 7/1/2019 Agustín Craft MD OR same dept: 7/1/2019 Agustín Craft MD OR same specialty: 7/1/2019 Agustín Craft MD  Last physical: 11/1/2017 Last MTM visit: Visit date not found   Next visit within 3 mo: Visit date not found  Next physical within 3 mo: Visit date not found  Prescriber OR PCP: Agustín Craft MD  Last diagnosis associated with med order: 1. HTN (hypertension)  - lisinopril-hydrochlorothiazide (PRINZIDE,ZESTORETIC) 20-12.5 mg per tablet [Pharmacy Med Name: LISINOPRIL-HCTZ 20-12.5 MG TAB]; TAKE 1 TABLET BY MOUTH EVERY DAY  Dispense: 90 tablet; Refill: 2    If protocol passes may refill for 12 months if within 3 months of last provider visit (or a total of 15 months).             Passed - Serum Potassium in past 12 months      Lab Results   Component Value Date    Potassium 4.8 05/11/2019             Passed - Serum Sodium in past 12 months      Lab Results   Component Value Date    Sodium 136 05/11/2019             Passed - Blood pressure on file in past 12 months     BP Readings from Last 1 Encounters:   07/01/19 114/58             Passed - Serum Creatinine in past 12 months      Creatinine   Date Value Ref Range Status   05/11/2019 1.26 (H) 0.60 - 1.10 mg/dL Final

## 2021-06-03 NOTE — TELEPHONE ENCOUNTER
Patient reports ongoing issues with neuropathy pain, especially in the right foot and lower leg.  She describes a burning pain intermittently in the foot and leg, it seems to originate in the foot and then move up the leg to about the knee.  The pain is usually mild to moderate but at times can become more severe, if so at bedtime it will disrupt her ability to fall asleep.  This is when she will sometimes take her hydrocodone acetaminophen, she is been using it as sparingly as possible, her last refill was about 6 weeks ago.  Provides her good relief of the pain.

## 2021-06-03 NOTE — TELEPHONE ENCOUNTER
RN cannot approve Refill Request    RN can NOT refill this medication med is not covered by policy/route to provider. Last office visit: 7/1/2019 Agustín Craft MD Last Physical: 11/1/2017 Last MTM visit: Visit date not found Last visit same specialty: 7/1/2019 Agustín Craft MD.  Next visit within 3 mo: Visit date not found  Next physical within 3 mo: Visit date not found      Elyssa Loera, [facility] Triage/Med Refill 11/29/2019    Requested Prescriptions   Pending Prescriptions Disp Refills     meloxicam (MOBIC) 15 MG tablet [Pharmacy Med Name: MELOXICAM 15 MG TABLET] 90 tablet 1     Sig: TAKE 1 TABLET BY MOUTH EVERY DAY       There is no refill protocol information for this order

## 2021-06-04 VITALS
DIASTOLIC BLOOD PRESSURE: 64 MMHG | HEART RATE: 77 BPM | TEMPERATURE: 98.3 F | HEIGHT: 63 IN | BODY MASS INDEX: 32.07 KG/M2 | SYSTOLIC BLOOD PRESSURE: 134 MMHG | RESPIRATION RATE: 16 BRPM | OXYGEN SATURATION: 97 % | WEIGHT: 181 LBS

## 2021-06-04 VITALS
BODY MASS INDEX: 31.18 KG/M2 | OXYGEN SATURATION: 95 % | DIASTOLIC BLOOD PRESSURE: 67 MMHG | HEART RATE: 80 BPM | RESPIRATION RATE: 16 BRPM | WEIGHT: 176 LBS | TEMPERATURE: 98 F | SYSTOLIC BLOOD PRESSURE: 154 MMHG

## 2021-06-04 VITALS
TEMPERATURE: 97.9 F | WEIGHT: 186.3 LBS | RESPIRATION RATE: 16 BRPM | BODY MASS INDEX: 33 KG/M2 | DIASTOLIC BLOOD PRESSURE: 80 MMHG | SYSTOLIC BLOOD PRESSURE: 128 MMHG | HEART RATE: 88 BPM | OXYGEN SATURATION: 92 %

## 2021-06-04 NOTE — TELEPHONE ENCOUNTER
Patient gave update below during last refill request.     Patient reports ongoing issues with neuropathy pain, especially in the right foot and lower leg.  She describes a burning pain intermittently in the foot and leg, it seems to originate in the foot and then move up the leg to about the knee.  The pain is usually mild to moderate but at times can become more severe, if so at bedtime it will disrupt her ability to fall asleep.  This is when she will sometimes take her hydrocodone acetaminophen, she is been using it as sparingly as possible, her last refill was about 6 weeks ago.  Provides her good relief of the pain.    Neuropathy  Refill-     HYDROcodone-acetaminophen 5-325 mg per tablet; TAKE ONE TABLET BY MOUTH TWICE DAILY AS NEEDED FOR PAIN  Dispense: 60 tablet; Refill: 0

## 2021-06-04 NOTE — TELEPHONE ENCOUNTER
Controlled Substance Refill Request  Medication Name:   Requested Prescriptions     Pending Prescriptions Disp Refills     HYDROcodone-acetaminophen 5-325 mg per tablet 60 tablet 0     Sig: TAKE ONE TABLET BY MOUTH TWICE DAILY AS NEEDED FOR chronic PAIN     Date Last Fill: 11/20/19  Pharmacy: [id number]  [place]     Submit electronically to pharmacy  Controlled Substance Agreement Date Scanned:   Encounter-Level CSA Scan Date - 07/05/2018:    Scan on 7/20/2018  1:47 PM       Last office visit with prescriber/PCP: 7/1/2019 Agustín Craft MD OR same dept: 7/1/2019 Agustín Craft MD OR same specialty: 7/1/2019 Agustín Craft MD  Last physical: 11/1/2017 Last MTM visit: Visit date not found

## 2021-06-05 VITALS
SYSTOLIC BLOOD PRESSURE: 138 MMHG | DIASTOLIC BLOOD PRESSURE: 68 MMHG | BODY MASS INDEX: 31.58 KG/M2 | RESPIRATION RATE: 18 BRPM | WEIGHT: 176 LBS | TEMPERATURE: 97.8 F

## 2021-06-05 VITALS
WEIGHT: 187 LBS | HEIGHT: 61 IN | DIASTOLIC BLOOD PRESSURE: 40 MMHG | TEMPERATURE: 97.6 F | SYSTOLIC BLOOD PRESSURE: 120 MMHG | BODY MASS INDEX: 35.3 KG/M2 | RESPIRATION RATE: 28 BRPM | OXYGEN SATURATION: 95 % | HEART RATE: 91 BPM

## 2021-06-05 VITALS
TEMPERATURE: 98.6 F | HEIGHT: 63 IN | WEIGHT: 189 LBS | BODY MASS INDEX: 33.49 KG/M2 | OXYGEN SATURATION: 92 % | SYSTOLIC BLOOD PRESSURE: 126 MMHG | DIASTOLIC BLOOD PRESSURE: 48 MMHG | HEART RATE: 82 BPM

## 2021-06-05 VITALS
WEIGHT: 184 LBS | BODY MASS INDEX: 31.58 KG/M2 | DIASTOLIC BLOOD PRESSURE: 50 MMHG | OXYGEN SATURATION: 97 % | SYSTOLIC BLOOD PRESSURE: 110 MMHG | RESPIRATION RATE: 20 BRPM | HEART RATE: 83 BPM | TEMPERATURE: 98 F

## 2021-06-05 NOTE — PROGRESS NOTES
ASSESMENT AND PLAN:  Diagnoses and all orders for this visit:    Neuropathy  Likely an exacerbation secondary to her previous discontinuation of Lyrica as detailed below.  Will restart the Lyrica and do some additional lab work-up as ordered below.  Reviewed the risks and benefits of the medication.  -     Glycosylated Hemoglobin A1c  -     Thyroid Cascade  -     Vitamin D, Total (25-Hydroxy)  -     pregabalin (LYRICA) 75 MG capsule; Take 1 capsule (75 mg total) by mouth 2 (two) times a day.  Dispense: 60 capsule; Refill: 11    Benign Essential Hypertension  Well controlled.  Medication review and counseling done, continue current plan.    Pulmonary fibrosis (H)  Stable.  Reviewed most recent pulmonary medicine consultation, encouraged the patient to follow-up with pulmonary medicine as directed.    Abnormal finding of blood chemistry, unspecified, history of blood sugars consistent with possible prediabetes   -     Glycosylated Hemoglobin A1c  -     Thyroid Cascade    Unspecified disorder of calcium metabolism   -     Vitamin D, Total (25-Hydroxy)        Reviewed the risks and benefits of the treatment plan with the patient and/or caregiver and we discussed indications for routine and emergent follow-up.        SUBJECTIVE: 79-year-old female comes in with a 1 to 2-month history of worsening burning pain in a sock distribution on the feet and lower legs bilaterally.  It has become intolerable for the patient.  She takes her hydrocodone twice daily but is not getting adequate relief.  It seems to have gotten progressively worse over this time.  And she is not sure why.  On review of her medications with her it turns out that she had stopped taking her Lyrica about 2 months ago.  It is unclear exactly why she stopped.  She does not recall having any side effects or problems from the medication.  There may have been an insurance coverage issue.  Pain is there all of the time, including at night, disruptive to her sleep  and her daily life.  Patient also due for follow-up on her pulmonary fibrosis.  She has not been coughing.  Her level of shortness of breath with exertion has been stable.  No shortness of breath at rest.  Patient does not have diabetes but does have a history of some borderline elevated blood glucose.    Past Medical History:   Diagnosis Date     Anxiety      Bronchitis with bronchospasm 6/11/2018     Depression      Diverticulitis      GERD (gastroesophageal reflux disease)      Hyperlipidemia      Hypertension      Osteoarthritis      Patient Active Problem List   Diagnosis     Osteoporosis     Obesity     Anxiety     Diverticulosis     Benign Essential Hypertension     Osteopenia     Dermatitis     Hyperlipidemia     Major depression     Lower Back Pain     Allergic Rhinitis     Localized Primary Osteoarthritis Of The Knee     Restless Legs Syndrome     GERD (gastroesophageal reflux disease)     Neuropathy     Osteoarthritis, multiple sites     Diastolic dysfunction     Hypoxia     Chronic obstructive pulmonary disease with acute exacerbation (H)     Pulmonary fibrosis (H)     Current Outpatient Medications   Medication Sig Dispense Refill     acetaminophen (TYLENOL ARTHRITIS) 650 MG CR tablet Take 650 mg by mouth 4 (four) times a day as needed for pain.       albuterol (PROVENTIL) 2.5 mg /3 mL (0.083 %) nebulizer solution Take 3 mL (2.5 mg total) by nebulization every 6 (six) hours as needed for wheezing. 75 vial 3     aspirin 81 MG EC tablet Take 81 mg by mouth daily.              calcium, as carbonate, (OS-JACKIE) 500 mg calcium (1,250 mg) tablet Take 1 tablet by mouth daily.       cholecalciferol, vitamin D3, (VITAMIN D3) 2,000 unit Tab Take 1 tablet by mouth daily.       fexofenadine (ALLEGRA) 180 MG tablet Take 180 mg by mouth daily.       FLUoxetine (PROZAC) 20 MG capsule Take 3 capsules (60 mg total) by mouth daily. 180 capsule 3     fluticasone propionate (FLONASE) 50 mcg/actuation nasal spray Apply 1  spray into each nostril 2 (two) times a day.       fluticasone propionate (FLONASE) 50 mcg/actuation nasal spray INSTILL 2 SPRAYS IN EACH NOSTRIL ONE TIME DAILY 48 g 3     furosemide (LASIX) 20 MG tablet Take 2 tablets (40 mg total) by mouth daily. 180 tablet 2     GLUCOSAMINE SULFATE (GLUCOSAMINE ORAL) Take 1 capsule by mouth daily.              HYDROcodone-acetaminophen 5-325 mg per tablet TAKE ONE TABLET BY MOUTH TWICE DAILY AS NEEDED FOR chronic PAIN 60 tablet 0     hypromellose (ARTIFICIAL TEARS,[id number],) Drop Administer 1 drop to both eyes 4 (four) times a day as needed (dry eyes). 30 mL 11     LACTOBACILLUS ACIDOPHILUS (PROBIOTIC ORAL) Take by mouth daily.       levalbuterol (XOPENEX) 1.25 mg/3 mL nebulizer solution Take 3 mL every 4 hours as needed for Reactive Airway disease. 75 mL 6     lisinopril-hydrochlorothiazide (PRINZIDE,ZESTORETIC) 20-12.5 mg per tablet Take 1 tablet by mouth daily. 90 tablet 1     meloxicam (MOBIC) 15 MG tablet TAKE 1 TABLET BY MOUTH EVERY DAY 90 tablet 1     nebulizer and compressor ([employer] AEROSOL DELIVERY SYSTEM) Kristina 1 nebulization every 6 hours as needed for wheezing. 1 each 0     omeprazole (PRILOSEC) 20 MG capsule TAKE 1 CAPSULE BY MOUTH EVERY DAY 90 capsule 3     oxybutynin (DITROPAN XL) 5 MG ER tablet Take 1 tablet (5 mg total) by mouth daily. 30 tablet 11     pregabalin (LYRICA) 75 MG capsule Take 1 capsule (75 mg total) by mouth 2 (two) times a day. 60 capsule 11     traZODone (DESYREL) 150 MG tablet TAKE 1 TABLET (150 MG TOTAL) BY MOUTH AT BEDTIME. 90 tablet 3     VENTOLIN HFA 90 mcg/actuation inhaler INHALE 1-2 PUFFS EVERY FOUR HOURS AS NEEDED FOR WHEEZING, SHORTNESS OF BREATH, OR COUGH. 18 Inhaler 2     No current facility-administered medications for this visit.      Social History     Tobacco Use   Smoking Status Former Smoker     Packs/day: 0.50     Years: 25.00     Pack years: 12.50   Smokeless Tobacco Never Used   Tobacco Comment    quit 25 yrs ago  "      OBJECTICE: /64 (Patient Site: Left Arm)   Pulse 77   Temp 98.3  F (36.8  C) (Oral)   Resp 16   Ht 5' 3\" (1.6 m)   Wt 181 lb (82.1 kg)   SpO2 97%   BMI 32.06 kg/m       Recent Results (from the past 24 hour(s))   Glycosylated Hemoglobin A1c    Collection Time: 01/13/20  2:57 PM   Result Value Ref Range    Hemoglobin A1c 5.6 3.5 - 6.0 %   Thyroid Ceres    Collection Time: 01/13/20  2:57 PM   Result Value Ref Range    TSH 1.63 0.30 - 5.00 uIU/mL        GEN-alert, appropriate, in no apparent distress   HEENT-mucous membranes are moist, neck is supple without palpable mass or thyroid abnormality   CV-regular rate and rhythm with no murmur   RESP-lungs clear to auscultation   Neurologic- diminished sensation with absent monofilament sensation on multiple points on the plantar aspect of the toes and feet.  Strength is symmetric.   EXTREM-warm with no edema   SKIN-thickened fungal nails, no ulcers on the feet      Agustín Craft          "

## 2021-06-06 NOTE — TELEPHONE ENCOUNTER
Refill Approved    Rx renewed per Medication Renewal Policy. Medication was last renewed on 2/13/19.    Alena Jeffers, [facility] Triage/Med Refill 3/6/2020     Requested Prescriptions   Pending Prescriptions Disp Refills     oxybutynin (DITROPAN XL) 5 MG ER tablet [Pharmacy Med Name: OXYBUTYNIN CL ER 5 MG TABLET] 30 tablet 11     Sig: TAKE 1 TABLET BY MOUTH EVERY DAY       Urinary Incontinence Medications Refill Protocol Passed - 3/6/2020  1:40 AM        Passed - PCP or prescribing provider visit in past 12 months       Last office visit with prescriber/PCP: 1/13/2020 Agustín Craft MD OR same dept: 1/13/2020 Agustín Craft MD OR same specialty: 1/13/2020 Agustín Craft MD  Last physical: 11/1/2017 Last MTM visit: Visit date not found   Next visit within 3 mo: Visit date not found  Next physical within 3 mo: Visit date not found  Prescriber OR PCP: Agustín Craft MD  Last diagnosis associated with med order: 1. Overactive bladder  - oxybutynin (DITROPAN XL) 5 MG ER tablet [Pharmacy Med Name: OXYBUTYNIN CL ER 5 MG TABLET]; TAKE 1 TABLET BY MOUTH EVERY DAY  Dispense: 30 tablet; Refill: 11    2. Female stress incontinence  - oxybutynin (DITROPAN XL) 5 MG ER tablet [Pharmacy Med Name: OXYBUTYNIN CL ER 5 MG TABLET]; TAKE 1 TABLET BY MOUTH EVERY DAY  Dispense: 30 tablet; Refill: 11    If protocol passes may refill for 12 months if within 3 months of last provider visit (or a total of 15 months).

## 2021-06-06 NOTE — PROGRESS NOTES
MTM Initial Encounter  Assessment & Plan                                                     COPD/Interstitial Fibrosis: Patient is currently using no inhalers. Continue to monitor and use albuterol PRN. Due for PFTs.      Depression: Stable, improvement with dose increase. Recommended to continue current regimen.      CHF: Stable. Patient to continue checking weight daily.      Hypertension: Stable, last BP well controlled. Recommended to continue current regimen.    Pain: Stable. Recommended to continue current regimen. Has seen benefit with higher dose of Lyrica as recommended at last MTM appt.      Insomnia: Stable. Recommended to continue current regimen.      GERD: Stable. Recommended to continue current regimen especially since she is on chronic meloxicam.      UI: Stable. Recommended to continue current regimen     Allergies: Stable. Recommended to continue current regimen.      Osteopenia: Stable. Patient to continue calcium supplements due to limited dietary calcium. Continue citrate since she is on PPI. Last Vitamin D level was WNL. Future along DEXA since she is due.  PLAN:   1. Future DEXA     Follow Up  As needed with MTM     Subjective & Objective                                                     Sara Ashton is a 79 y.o. female called for an initial visit for Medication Therapy Management. She was referred to me from [employer] [facility] program. Last spoke with patient on 5/17/19 for JAYME after discharge from [facility] for COPD.     Chief Complaint: Medication review, no concerns.     Medication Adherence/Access: No issues noted.     COPD/Interstitial Fibrosis: Patient is currently using no inhalers. Reports that she is not needing any and has not used for a long time.   Pt does not currently smoke - quit 30 years ago.  PFTs on 6/11/19. Recommended repeat in 6 months     Depression: Currently taking fluoxetine 20 mg x3 (60 mg) daily. Dose increased by PCP on 5/21/19. Reports that higher dose has been working  well.      CHF: Diastolic dysfunction. Current medications include furosemide 20 mg x2 (40 mg) daily. No recent swelling.   ECHO: Date 2/27/19, EF 60%  Pt is measuring daily weights: weight stable.   Pt is following a low sodium diet and avoiding etoh.      Hypertension: Currently taking lisinopril-HCTZ 20-12.5 mg daily AM. Patient does not monitor BP at home. Denies lightheadedness/dizziness.     Pain: Currently taking meloxciam 15 mg daily, hydrocodone-APAP 5-325 mg PRN, Lyrica 75 mg two times a day, and APAP. Patient did stop Lyrica for a few months and restarted on 1/13/20 -- improvement back on it. Does not use ibuprofen. Takes meloxicam with food. Arthritis all over. Will take hydrocodone-APAP AM PRN and PM regularly because her neuropathy effects her sleep. Will sometimes skip hydro-APAP AM dose, and if she does will take APAP otherwise does not use. Hydro-APAP takes the edge off so she can sleep. No constipation, watches her diet.      Insomnia: Currently taking trazodone 150 mg HS. Denies any sleep issues.      GERD: Currently taking omeprazole 20 mg daily. No problems, denies heartburn symptoms.      UI: Currently taking oxybutynin XL 5 mg daily. Doing well. Has dry mouth, uses Biotene.S Symptoms are about the same since our last appointment.      Allergies: Currently taking Allegra and fluticasone nasal spray. Uses yearround, but right now symptoms are ok -- worse when warm and trees are blooming. Uses fluticasone at night which helps her breathe better.      Osteopenia: Currently taking calcium citrate and Vitamin D 2000 IU daily. Last DEXA in 2012 T score -1.4. Was on ibandronate for 5 years and given a holiday in 2012.   Dietary: no milk, cheese and yogurt occasionally.   Vitamin D, Total (25-Hydroxy)   Date Value Ref Range Status   01/13/2020 57.3 30.0 - 80.0 ng/mL Final       PMH: reviewed in EPIC   Allergies/ADRs: reviewed in EPIC   Alcohol: reviewed in EPIC   Tobacco:   Social History     Tobacco  Use   Smoking Status Former Smoker     Packs/day: 0.50     Years: 25.00     Pack years: 12.50   Smokeless Tobacco Never Used   Tobacco Comment    quit 25 yrs ago     Today's Vitals: There were no vitals filed for this visit.  ----------------    The patient was given a CMS standardized format medication action plan    I spent 15 minutes with this patient today. An extra 15 minutes was spent creating the Medication Action Plan. All changes were made via collaborative practice agreement with Agustín Craft MD. A copy of the visit note was provided to the patient's provider.     Jade Narvaez, Pharm.D., BCACP  Medication Therapy Management Pharmacist  [facility]     Current Outpatient Medications   Medication Sig Dispense Refill     acetaminophen (TYLENOL ARTHRITIS) 650 MG CR tablet Take 650 mg by mouth 4 (four) times a day as needed for pain.       albuterol (PROVENTIL) 2.5 mg /3 mL (0.083 %) nebulizer solution Take 3 mL (2.5 mg total) by nebulization every 6 (six) hours as needed for wheezing. 75 vial 3     aspirin 81 MG EC tablet Take 81 mg by mouth daily.              calcium, as carbonate, (OS-JACKIE) 500 mg calcium (1,250 mg) tablet Take 1 tablet by mouth daily.       cholecalciferol, vitamin D3, (VITAMIN D3) 2,000 unit Tab Take 1 tablet by mouth daily.       fexofenadine (ALLEGRA) 180 MG tablet Take 180 mg by mouth daily.       FLUoxetine (PROZAC) 20 MG capsule TAKE 3 CAPSULES BY MOUTH EVERY  capsule 3     fluticasone propionate (FLONASE) 50 mcg/actuation nasal spray Apply 1 spray into each nostril 2 (two) times a day.       fluticasone propionate (FLONASE) 50 mcg/actuation nasal spray INSTILL 2 SPRAYS IN EACH NOSTRIL ONE TIME DAILY 48 g 3     furosemide (LASIX) 20 MG tablet Take 2 tablets (40 mg total) by mouth daily. 180 tablet 2     GLUCOSAMINE SULFATE (GLUCOSAMINE ORAL) Take 1 capsule by mouth daily.              hypromellose (ARTIFICIAL TEARS,[id number],) Drop Administer 1 drop  to both eyes 4 (four) times a day as needed (dry eyes). 30 mL 11     LACTOBACILLUS ACIDOPHILUS (PROBIOTIC ORAL) Take by mouth daily.       levalbuterol (XOPENEX) 1.25 mg/3 mL nebulizer solution Take 3 mL every 4 hours as needed for Reactive Airway disease. 75 mL 6     lisinopril-hydrochlorothiazide (PRINZIDE,ZESTORETIC) 20-12.5 mg per tablet Take 1 tablet by mouth daily. 90 tablet 1     meloxicam (MOBIC) 15 MG tablet TAKE 1 TABLET BY MOUTH EVERY DAY 90 tablet 1     nebulizer and compressor ([employer] AEROSOL DELIVERY SYSTEM) Kristina 1 nebulization every 6 hours as needed for wheezing. 1 each 0     omeprazole (PRILOSEC) 20 MG capsule TAKE 1 CAPSULE BY MOUTH EVERY DAY 90 capsule 3     oxybutynin (DITROPAN XL) 5 MG ER tablet TAKE 1 TABLET BY MOUTH EVERY DAY 90 tablet 3     pregabalin (LYRICA) 75 MG capsule Take 1 capsule (75 mg total) by mouth 2 (two) times a day. 60 capsule 11     traZODone (DESYREL) 150 MG tablet TAKE 1 TABLET (150 MG TOTAL) BY MOUTH AT BEDTIME. 90 tablet 3     VENTOLIN HFA 90 mcg/actuation inhaler INHALE 1-2 PUFFS EVERY FOUR HOURS AS NEEDED FOR WHEEZING, SHORTNESS OF BREATH, OR COUGH. 18 Inhaler 2     No current facility-administered medications for this visit.

## 2021-06-06 NOTE — TELEPHONE ENCOUNTER
Refill Approved    Rx renewed per Medication Renewal Policy. Medication was last renewed on 5/21/19.    Alena Jeffers, [facility] Connection Triage/Med Refill 2/19/2020     Requested Prescriptions   Pending Prescriptions Disp Refills     FLUoxetine (PROZAC) 20 MG capsule [Pharmacy Med Name: FLUOXETINE HCL 20 MG CAPSULE] 180 capsule 3     Sig: TAKE 3 CAPSULES BY MOUTH EVERY DAY       SSRI Refill Protocol  Passed - 2/14/2020  1:59 AM        Passed - PCP or prescribing provider visit in last year     Last office visit with prescriber/PCP: 1/13/2020 Agustín Craft MD OR same dept: 1/13/2020 Agustín Craft MD OR same specialty: 1/13/2020 Agustín Craft MD  Last physical: 11/1/2017 Last MTM visit: Visit date not found   Next visit within 3 mo: Visit date not found  Next physical within 3 mo: Visit date not found  Prescriber OR PCP: Agustín Craft MD  Last diagnosis associated with med order: 1. Major depressive disorder in partial remission, unspecified whether recurrent (H)  - FLUoxetine (PROZAC) 20 MG capsule [Pharmacy Med Name: FLUOXETINE HCL 20 MG CAPSULE]; TAKE 3 CAPSULES BY MOUTH EVERY DAY  Dispense: 180 capsule; Refill: 3    If protocol passes may refill for 12 months if within 3 months of last provider visit (or a total of 15 months).

## 2021-06-08 NOTE — PROGRESS NOTES
"ASSESMENT AND PLAN:  Diagnoses and all orders for this visit:    Bilateral low back pain without sciatica, unspecified chronicity  She will continue Mobic.    Frequent urination  -     Urinalysis-UC if Indicated  -     Culture, Urine  We will treat if culture negative.    Glycosuria  -     Glucose  -     Glycosylated Hemoglobin A1c  Today showed positive glucose in the urine.  Reviewed previous UAs, no history of positive glucose in the past.  Added glucose and A1c.  A1c came back 5.7.  Message sent to staff to call patient to inform normal A1c.    Elevated glucose  -     Glycosylated Hemoglobin A1c        SUBJECTIVE: Sara Ashton came in with bilateral low back pain for 3 days.  States\" I think I have urinary tract infection\".  She has urinary frequency but no dysuria.  No blood in the urine.  No fever or chills.  No suprapubic pain.  States she has similar symptoms back in March.  She was seen at walk-in clinic in March, note reviewed.  She was given Omnicef but urine culture came back negative.  Unclear if she was called to discontinue after negative culture.    Past Medical History:   Diagnosis Date     Anxiety      Bronchitis with bronchospasm 6/11/2018     Depression      Diverticulitis      GERD (gastroesophageal reflux disease)      Hyperlipidemia      Hypertension      Osteoarthritis      Patient Active Problem List   Diagnosis     Osteoporosis     Obesity     Anxiety     Diverticulosis     Benign Essential Hypertension     Osteopenia     Dermatitis     Hyperlipidemia     Major depression     Lower Back Pain     Allergic Rhinitis     Localized Primary Osteoarthritis Of The Knee     Restless Legs Syndrome     GERD (gastroesophageal reflux disease)     Neuropathy     Osteoarthritis, multiple sites     Diastolic dysfunction     Hypoxia     Chronic obstructive pulmonary disease with acute exacerbation (H)     Pulmonary fibrosis (H)       Allergies:  No Known Allergies    Social History     Tobacco Use   Smoking " Status Former Smoker     Packs/day: 0.50     Years: 25.00     Pack years: 12.50   Smokeless Tobacco Never Used   Tobacco Comment    quit 25 yrs ago       Review of systems otherwise negative except as listed in HPI.   Social History     Tobacco Use   Smoking Status Former Smoker     Packs/day: 0.50     Years: 25.00     Pack years: 12.50   Smokeless Tobacco Never Used   Tobacco Comment    quit 25 yrs ago       OBJECTICE: /80 (Patient Site: Left Arm, Patient Position: Sitting, Cuff Size: Adult Regular)   Pulse 88   Temp 97.9  F (36.6  C) (Oral)   Resp 16   Wt 186 lb 4.8 oz (84.5 kg)   SpO2 92%   BMI 33.00 kg/m      DATA REVIEWED:    Labs Reviewed or Ordered (1):     GEN-alert,  in no apparent distress.  CV-regular rate and rhythm with no murmur.   RESP-lungs clear to auscultation .  ABDOMEN- Soft , no flank tenderness.   BACK-bilateral mild lower lumbar paraspinal tenderness.  Negative straight leg raising test.  SKIN-normal      This transcription uses voice recognition software, which may contain typographical errors.      Onel Godinez   6/15/2020

## 2021-06-08 NOTE — PROGRESS NOTES
Chief Complaint   Patient presents with     Cough     finished zpk 2 wks, still having trouble breathing, not getting any better         HPI:   Sara Ashton is a 76 y.o. female has been sick for two weeks with coughing, chest congestion. Lots of head congestion.  Can't get anything up.  No fever.    Completed zpack but no improvement.   has been coughing also.    ROS:  Constitutional: poor appetite  Eyes: negative   ENT: right ear pain--sharp pain.  Sore throat with coughing.  Respiratory: as per HPI   CV: as per HPI  GI: diarrhea since starting antibiotics--no blood in stools  : no dysuria  SKIN: no rash  MS: no change  NEURO: some headache.     Medications:  Current Outpatient Prescriptions on File Prior to Visit   Medication Sig Dispense Refill     acetaminophen (TYLENOL ARTHRITIS) 650 MG CR tablet Take 650 mg by mouth 4 (four) times a day as needed for pain.       albuterol (PROAIR HFA) 90 mcg/actuation inhaler Inhale 1-2 puffs every 4 (four) hours as needed for wheezing or shortness of breath (cough). 1 Inhaler 3     amitriptyline (ELAVIL) 25 MG tablet TAKE ONE TABLET BY MOUTH NIGHTLY AT BEDTIME 60 tablet 3     aspirin 81 MG EC tablet Take 81 mg by mouth 3 (three) times a week.       CA CARB & GLUC/MAG OX & GLUC (CALCIUM MAGNESIUM ORAL) Take by mouth. 600-100-300 liquid       cholecalciferol, vitamin D3, (VITAMIN D3) 2,000 unit Tab Take 1 tablet by mouth daily.       ECHINACEA ORAL Take 1 capsule by mouth daily.       FLUoxetine (PROZAC) 20 MG tablet TAKE 1/2 TABLET ONCE DAILY WHILE WEANING DOWN OFF THE WELLBUTRIN THEN ONCE DAILY 30 tablet 6     fluticasone (FLONASE) 50 mcg/actuation nasal spray 2 sprays into each nostril daily. 48 g 3     FOLIC ACID/MULTIVITS-MIN/LUT (CENTRUM SILVER ORAL) Take 1 tablet by mouth 3 (three) times a day.       ginkgo biloba 40 mg Tab Take 1 tablet by mouth daily.       GLUCOSAMINE SULFATE (GLUCOSAMINE ORAL) Take 2 capsules by mouth daily.       HYDROcodone-acetaminophen  "5-325 mg per tablet TAKE ONE TABLET BY MOUTH TWICE DAILY AS NEEDED FOR PAIN 60 tablet 0     ketotifen (ZADITOR/ZYRTEC ITCHY EYES) 0.025 % (0.035 %) ophthalmic solution 1 drop to affected eye every 8 hours as needed for redness, itching or irritation - Max of 2 doses in 24 hours 5 mL 0     LACTOBACILLUS ACIDOPHILUS (PROBIOTIC ORAL) Take by mouth daily.       lisinopril-hydrochlorothiazide (PRINZIDE,ZESTORETIC) 20-12.5 mg per tablet TAKE ONE TABLET BY MOUTH ONE TIME DAILY 90 tablet 1     LYRICA 100 mg capsule TAKE 1 CAPSULE (100 MG TOTAL) BY MOUTH 2 TIMES A DAY. 60 capsule 1     meloxicam (MOBIC) 15 MG tablet TAKE 1 TABLET BY MOUTH EVERY DAY 90 tablet 1     methocarbamol (ROBAXIN) 750 MG tablet TAKE ONE TABLET BY MOUTH THREE TIMES DAILY AS NEEDED FOR MUSCLE SPASM 90 tablet 0     omeprazole (PRILOSEC) 20 MG capsule TAKE ONE CAPSULE BY MOUTH ONE TIME DAILY 90 capsule 3     traZODone (DESYREL) 150 MG tablet TAKE ONE TABLET BY MOUTH NIGHTLY AT BEDTIME 90 tablet 3     ALLERGY AND CONGESTION RELIEF  mg per 24 hr tablet TAKE ONE TABLET BY MOUTH ONE TIME DAILY 90 tablet 1     olopatadine (PAZEO) 0.7 % Drop Apply 1 drop to eye Daily at 8:00 am..       Current Facility-Administered Medications on File Prior to Visit   Medication Dose Route Frequency Provider Last Rate Last Dose     amitriptyline tablet 25 mg (ELAVIL)  25 mg Oral QHS Damon Parkinson DPM             Social History:  Social History   Substance Use Topics     Smoking status: Former Smoker     Smokeless tobacco: Never Used     Alcohol use Yes      Comment: occasional         Physical Exam:   Vitals:    02/10/17 0901   BP: 116/48   Pulse: 70   Resp: 16   Temp: 98.1  F (36.7  C)   TempSrc: Oral   SpO2: 92%   Weight: 188 lb 8 oz (85.5 kg)   Height: 5' 2\" (1.575 m)       GENERAL:   Alert. Oriented.  EYES: Clear  HENT:  Ears: R TM pearly gray. Normal landmarks. L TM pearly gray.  Normal landmarks  Nose: Clear.  Sinuses: Nontender.  Oropharynx:  No erythema. No " exudate.  NECK: Supple. No adenopathy.  LUNGS: some scattered rhonchi.  Prolonged expiration. Scattered wheezing  HEART: RRR  SKIN:  No rash.         Assessment/Plan:    1. Bronchitis, acute  amoxicillin-clavulanate (AUGMENTIN) 875-125 mg per tablet   2. Bronchitis  predniSONE (DELTASONE) 20 MG tablet    albuterol (PROAIR HFA) 90 mcg/actuation inhaler        Antibiotics as ordered.  Dextromethorphan for cough as needed.  Good fluid intake.  Acetaminophen or ibuprofen as needed.  Prednisone and albuterol for wheezing.  F/U if no improvement or worsening.       The following portions of the patient's history were reviewed and updated as appropriate: allergies, current medications, past family history, past medical history, past social history, past surgical history and problem list.    Yumiko Santiago MD      2/10/2017

## 2021-06-08 NOTE — PROGRESS NOTES
Assessment: /    Plan:    1. Bronchitis  albuterol (PROAIR HFA) 90 mcg/actuation inhaler    azithromycin (ZITHROMAX) 250 MG tablet    codeine-guaiFENesin (GUAIFENESIN AC)  mg/5 mL liquid       If the effect of Allegra wanes, she will use Claritin.  Call or recheck if any problems.      Subjective:    HPI:  Sara Ashton is a 76-year-old female presenting with a cough.  This has been occurring for 3 days.  Symptoms have been worsening.  Cough is productive of green sputum.  She takes Allegra for allergies.  Claritin previously worked for her.  I reviewed 11/21/16 note by Dr. Craft regarding bronchitis.  I reviewed chest x-ray report from that day, which indicated no infiltrate.    Social Hx:  Former smoker.  She is the primary caregiver for her , who has dementia.    Review of Systems:  No fever, chest pain, vomiting.      Current Outpatient Prescriptions   Medication Sig Dispense Refill     acetaminophen (TYLENOL ARTHRITIS) 650 MG CR tablet Take 650 mg by mouth 4 (four) times a day as needed for pain.       ALLERGY AND CONGESTION RELIEF  mg per 24 hr tablet TAKE ONE TABLET BY MOUTH ONE TIME DAILY 90 tablet 1     amitriptyline (ELAVIL) 25 MG tablet TAKE ONE TABLET BY MOUTH NIGHTLY AT BEDTIME 60 tablet 3     aspirin 81 MG EC tablet Take 81 mg by mouth 3 (three) times a week.       CA CARB & GLUC/MAG OX & GLUC (CALCIUM MAGNESIUM ORAL) Take by mouth. 600-100-300 liquid       cholecalciferol, vitamin D3, (VITAMIN D3) 2,000 unit Tab Take 1 tablet by mouth daily.       ECHINACEA ORAL Take 1 capsule by mouth daily.       FLUoxetine (PROZAC) 20 MG tablet TAKE 1/2 TABLET ONCE DAILY WHILE WEANING DOWN OFF THE WELLBUTRIN THEN ONCE DAILY 30 tablet 6     fluticasone (FLONASE) 50 mcg/actuation nasal spray 2 sprays into each nostril daily. 48 g 3     FOLIC ACID/MULTIVITS-MIN/LUT (CENTRUM SILVER ORAL) Take 1 tablet by mouth 3 (three) times a day.       ginkgo biloba 40 mg Tab Take 1 tablet by mouth daily.        GLUCOSAMINE SULFATE (GLUCOSAMINE ORAL) Take 2 capsules by mouth daily.       HYDROcodone-acetaminophen 5-325 mg per tablet TAKE ONE TABLET BY MOUTH TWICE DAILY AS NEEDED FOR PAIN 60 tablet 0     ketotifen (ZADITOR/ZYRTEC ITCHY EYES) 0.025 % (0.035 %) ophthalmic solution 1 drop to affected eye every 8 hours as needed for redness, itching or irritation - Max of 2 doses in 24 hours 5 mL 0     LACTOBACILLUS ACIDOPHILUS (PROBIOTIC ORAL) Take by mouth daily.       lisinopril-hydrochlorothiazide (PRINZIDE,ZESTORETIC) 20-12.5 mg per tablet TAKE ONE TABLET BY MOUTH ONE TIME DAILY 90 tablet 1     LYRICA 100 mg capsule TAKE 1 CAPSULE (100 MG TOTAL) BY MOUTH 2 TIMES A DAY. 60 capsule 1     meloxicam (MOBIC) 15 MG tablet TAKE 1 TABLET BY MOUTH EVERY DAY 90 tablet 1     methocarbamol (ROBAXIN) 750 MG tablet TAKE ONE TABLET BY MOUTH THREE TIMES DAILY AS NEEDED FOR MUSCLE SPASM 90 tablet 0     olopatadine (PAZEO) 0.7 % Drop Apply 1 drop to eye Daily at 8:00 am..       omeprazole (PRILOSEC) 20 MG capsule TAKE ONE CAPSULE BY MOUTH ONE TIME DAILY 90 capsule 3     traZODone (DESYREL) 150 MG tablet TAKE ONE TABLET BY MOUTH NIGHTLY AT BEDTIME 90 tablet 3     albuterol (PROAIR HFA) 90 mcg/actuation inhaler Inhale 1-2 puffs every 4 (four) hours as needed for wheezing or shortness of breath (cough). 1 Inhaler 3     azithromycin (ZITHROMAX) 250 MG tablet Take two tablets by mouth today, then one tablet by mouth daily until gone. 6 tablet 0     codeine-guaiFENesin (GUAIFENESIN AC)  mg/5 mL liquid Take 5-10 mL by mouth every 6 (six) hours as needed. 240 mL 1     Current Facility-Administered Medications   Medication Dose Route Frequency Provider Last Rate Last Dose     amitriptyline tablet 25 mg (ELAVIL)  25 mg Oral QHS Damon Parkinson DPM             Objective:    Vitals:    01/31/17 1347   BP: 116/62   Pulse: 78   Resp: 17   Temp: 98.1  F (36.7  C)   SpO2: 97%       Gen:  NAD, VSS  Ears normal  Throat normal  Neck supple without  adenopathy  Lungs:  normal  Heart:  normal  Skin without rash        ADDITIONAL HISTORY SUMMARIZED (2): Reviewed 11/21/16 note.  DECISION TO OBTAIN EXTRA INFORMATION (1): None.   RADIOLOGY TESTS (1): Reviewed 11/21/16 chest x-ray.  LABS (1): None.  MEDICINE TESTS (1): None.  INDEPENDENT REVIEW (2 each): None.     Total Data Points: 3

## 2021-06-08 NOTE — TELEPHONE ENCOUNTER
"  CC:  Lower back pain      \"Im pretty sure I have a kidney infection\"      No numbness / tingling   Non radiating - no belly pain     No problems urinating       Pain is a 6/10      She is requesting an in-person visit          At home     > IBU helps        A/P:   > Cont with home care as before - can also try ICE prn             COVID 19 Nurse Triage Plan/Patient Instructions    Please be aware that novel coronavirus (COVID-19) may be circulating in the community. If you develop symptoms such as fever, cough, or SOB or if you have concerns about the presence of another infection including coronavirus (COVID-19), please contact your health care provider or visit [email or web address].     Disposition/Instructions    Patient to schedule an In Person Visit with provider. Reference Visit Selection Guide.    Thank you for taking steps to prevent the spread of this virus.  o Limit your contact with others.  o Wear a simple mask to cover your cough.  o Wash your hands well and often.    Resources    [facility]: About COVID-19: [email or web address]    CDC: What to Do If You're Sick: www.cdc.gov/coronavirus/2019-ncov/about/steps-when-sick.html    CDC: Ending Home Isolation: www.cdc.gov/coronavirus/2019-ncov/hcp/disposition-in-home-patients.html     CDC: Caring for Someone: www.cdc.gov/coronavirus/2019-ncov/if-you-are-sick/care-for-someone.html     [facility]: Interim Guidance for Hospital Discharge to Home: [email or web address]/hcp/hospdischarge.[email or web address]    [facility] clinical trials (COVID-19 research studies): [email or web address]     Below are the COVID-19 hotlines at the [facility] ([facility]). Interpreters are available.   o For health questions: Call 964-214-2482 or 1-102.607.6897 (7 a.m. to 7 p.m.)  o For questions about schools and childcare: Call 639-297-3268 or 1-216.559.6714 (7 a.m. to 7 p.m.)                                Reason for " Disposition    Age > 50 and no history of prior similar back pain    Additional Information    Negative: Passed out (i.e., fainted, collapsed and was not responding)    Negative: Shock suspected (e.g., cold/pale/clammy skin, too weak to stand, low BP, rapid pulse)    Negative: Sounds like a life-threatening emergency to the triager    Negative: Major injury to the back (e.g., MVA, fall > 10 feet or 3 meters, penetrating injury, etc.)    Negative: Pain in the upper back over the ribs (rib cage) that radiates (travels) into the chest    Negative: Pain in the upper back over the ribs (rib cage) and worsened by coughing (or clearly increases with breathing)    Negative: SEVERE back pain of sudden onset and age > 60    Negative: SEVERE abdominal pain (e.g., excruciating)    Negative: Abdominal pain and age > 60    Negative: Unable to urinate (or only a few drops) and bladder feels very full    Negative: Loss of bladder or bowel control (urine or bowel incontinence; wetting self, leaking stool) of new onset    Negative: Numbness (loss of sensation) in groin or rectal area    Negative: Pain radiates into groin, scrotum    Negative: Blood in urine (red, pink, or tea-colored)    Negative: Vomiting and pain over lower ribs of back (i.e., flank - kidney area)    Negative: Weakness of a leg or foot (e.g., unable to bear weight, dragging foot)    Negative: Patient sounds very sick or weak to the triager    Negative: Fever > 100.5 F (38.1 C) and flank pain    Negative: Pain or burning with passing urine (urination)    Negative: SEVERE back pain (e.g., excruciating, unable to do any normal activities) and not improved after pain medicine and CARE ADVICE    Negative: Numbness in an arm or hand (i.e., loss of sensation) and upper back pain    Negative: Numbness in a leg or foot (i.e., loss of sensation)    Negative: High-risk adult (e.g., history of cancer, history of HIV, or history of IV drug abuse)    Negative: Painful rash with  multiple small blisters grouped together (i.e., dermatomal distribution or 'band' or 'stripe')    Negative: Pain radiates into the thigh or further down the leg, and in both legs    Protocols used: BACK PAIN-A-[place]

## 2021-06-09 NOTE — PROGRESS NOTES
ASSESMENT AND PLAN:  Diagnoses and all orders for this visit:    Dyspnea on exertion  Detailed counseling today with the patient around differential diagnosis.  We reviewed the clinic notes from my partners that saw her recently, she was diagnosed with bronchitis, she was treated with 2 different types of antibiotic, initially azithromycin and then amoxicillin clavulanate.  She completed both courses of antibiotics.  Minimal improvement.  She was also prescribed a albuterol inhaler and got minimal improvement in her symptoms with that.  I think she did have a bronchitis and it is resolving but I am also concerned that she may actually be having an anginal equivalent with her exertional shortness of breath as described below.  We reviewed indications for routine and emergent follow-up.  She will continue to take her daily aspirin.  -     NM Exercise Stress Test; Future; Expected date: 3/1/17  -     XR Chest PA and Lateral done today and reviewed by me personally does not show any pneumonia or mass.  The radiology report does indicate there are some findings that are possibly consistent with pulmonary fibrosis.  Discussed this with the patient.  If the cardiac stress test is negative, then the next step in evaluation would be a high resolution CT scan of the chest to evaluate for pulmonary fibrosis.  I will call the patient when her stress test results are available.  -     Comprehensive Metabolic Panel  -     HM2(CBC w/o Differential) shows mild anemia, improved compared to 4 months ago.  Normal white count.    Lower Back Pain, chronic with intermittent muscle spasm  Reviewed the risks and benefits of the below medication, she has tolerated well in the past.  -     methocarbamol (ROBAXIN) 750 MG tablet; Take 1 tablet (750 mg total) by mouth every 8 (eight) hours as needed.  Dispense: 90 tablet; Refill: 0          SUBJECTIVE: 76-year-old female comes in today with frustration over her failure to improve after 2  treatments for bronchitis.  Her cough has slowly been improving but her shortness of breath is not.  She is no longer having any chest pain, including no chest pain with coughing.  She does get back pain but this she thinks is an exacerbation of her chronic back pain.  She is out of her muscle relaxer.  Patient gets tightness and spasming and pain of moderate severity in her low back, and sometimes radiates down the buttock area but no radiation below the knees.  The pain does get worse when she bends and twists or does prolonged standing.  Her main concern today is short and supple breath.  She reports that at rest she is not feeling short of breath.  However, when she walks across the room or does any physical exertion beyond that she starts to feel moderately short of breath and then sits down and the shortness of breath resolves.  There has been no improvement in her shortness of breath despite her treatments.  The shortness of breath does not respond to albuterol.  When she gets short of breath she does not get palpitations or chest pain or diaphoresis or nausea or near syncope.    Review of systems: No fever, no vomiting, no diarrhea, no blood in the stool or black tarry stools, no chest pain, remainder of review of systems as above or negative.      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       albuterol (PROAIR HFA) 90 mcg/actuation inhaler Inhale 1-2 puffs every 4 (four) hours as needed for wheezing or shortness of breath (cough). 1 Inhaler 3     ALLERGY AND CONGESTION RELIEF  mg per 24 hr tablet TAKE ONE TABLET BY MOUTH ONE TIME DAILY 90 tablet 1     amitriptyline (ELAVIL) 25 MG tablet TAKE ONE TABLET BY MOUTH NIGHTLY AT BEDTIME 60 tablet 3     aspirin 81 MG EC tablet Take 81 mg by mouth 3 (three) times a week.       CA CARB & GLUC/MAG OX & GLUC (CALCIUM MAGNESIUM ORAL) Take by mouth. 600-100-300 liquid       cholecalciferol, vitamin D3, (VITAMIN D3) 2,000 unit Tab Take 1 tablet by mouth daily.       ECHINACEA ORAL Take 1 capsule by mouth daily.       FLUoxetine (PROZAC) 20 MG tablet TAKE 1/2 TABLET ONCE DAILY WHILE WEANING DOWN OFF THE WELLBUTRIN THEN ONCE DAILY 30 tablet 6     fluticasone (FLONASE) 50 mcg/actuation nasal spray 2 sprays into each nostril daily. 48 g 3     FOLIC ACID/MULTIVITS-MIN/LUT (CENTRUM SILVER ORAL) Take 1 tablet by mouth 3 (three) times a day.       ginkgo biloba 40 mg Tab Take 1 tablet by mouth daily.       GLUCOSAMINE SULFATE (GLUCOSAMINE ORAL) Take 2 capsules by mouth daily.       HYDROcodone-acetaminophen 5-325 mg per tablet TAKE ONE TABLET BY MOUTH TWICE DAILY AS NEEDED FOR PAIN 60 tablet 0     LACTOBACILLUS ACIDOPHILUS (PROBIOTIC ORAL) Take by mouth daily.       lisinopril-hydrochlorothiazide (PRINZIDE,ZESTORETIC) 20-12.5 mg per tablet TAKE ONE TABLET BY MOUTH ONE TIME DAILY 90 tablet 1     LYRICA 100 mg capsule TAKE 1 CAPSULE (100 MG TOTAL) BY MOUTH 2 TIMES A DAY. 60 capsule 1     meloxicam (MOBIC) 15 MG tablet TAKE 1 TABLET BY MOUTH EVERY DAY 90 tablet 1     methocarbamol (ROBAXIN) 750 MG tablet TAKE ONE TABLET BY MOUTH THREE TIMES DAILY AS NEEDED FOR MUSCLE SPASM 90 tablet 0     olopatadine (PAZEO) 0.7 % Drop Apply 1 drop to eye Daily  "at 8:00 am..       omeprazole (PRILOSEC) 20 MG capsule TAKE ONE CAPSULE BY MOUTH ONE TIME DAILY 90 capsule 3     traZODone (DESYREL) 150 MG tablet TAKE ONE TABLET BY MOUTH NIGHTLY AT BEDTIME 90 tablet 3     No current facility-administered medications for this visit.      History   Smoking Status     Former Smoker   Smokeless Tobacco     Never Used       OBJECTICE:   Visit Vitals     /60 (Patient Site: Left Arm, Patient Position: Sitting, Cuff Size: Adult Regular)     Pulse 81     Temp 98.4  F (36.9  C) (Oral)     Resp 20     Ht 5' 2\" (1.575 m)     Wt 189 lb (85.7 kg)     SpO2 96%     Breastfeeding No     BMI 34.57 kg/m2        Recent Results (from the past 24 hour(s))   HM2(CBC w/o Differential)    Collection Time: 03/01/17  4:00 PM   Result Value Ref Range    WBC 7.1 4.0 - 11.0 thou/uL    RBC 4.01 3.80 - 5.40 mill/uL    Hemoglobin 11.2 (L) 12.0 - 16.0 g/dL    Hematocrit 33.3 (L) 35.0 - 47.0 %    MCV 83 80 - 100 fL    MCH 28.0 27.0 - 34.0 pg    MCHC 33.7 32.0 - 36.0 g/dL    RDW 14.6 (H) 11.0 - 14.5 %    Platelets 244 140 - 440 thou/uL    MPV 7.8 7.0 - 10.0 fL        GEN-alert, appropriate, in no apparent distress   HEENT-normal color to her conjunctiva bilaterally, mucous membranes are moist, neck is supple without palpable mass   CV-regular rate and rhythm with no murmur   RESP-lungs clear to auscultation, no wheezing, no crackles, good air movement.   ABDOMINAL-soft, nontender, no palpable masses or organomegaly   EXTREM-warm with no edema and tenderness   SKIN-no ulcers or vesicles   Neurologic-cranial nerves II through XII are intact, strength and sensation are intact and symmetric.   Musculoskeletal-no midline spinal point tenderness on palpation of the spine.      Agustín Craft          "

## 2021-06-09 NOTE — TELEPHONE ENCOUNTER
Patient notified. Can take Tylenol for back pain per MD. The patient verbalizes understanding of provider/CSS instructions for follow-up and continued care per provider message.

## 2021-06-09 NOTE — TELEPHONE ENCOUNTER
----- Message from Onel Godinez MD sent at 6/15/2020  3:50 PM CDT -----  A1c is normal. She does not have diabetes. Please inform the patient.    Dr. Onel Godinez  6/15/2020 3:50 PM

## 2021-06-09 NOTE — TELEPHONE ENCOUNTER
Low on supply.   Please note.    Controlled Substance Refill Request  Medication Name:   Requested Prescriptions     Pending Prescriptions Disp Refills     pregabalin (LYRICA) 75 MG capsule [Pharmacy Med Name: PREGABALIN 75 MG CAPSULE] 60 capsule 0     Sig: TAKE 1 CAPSULE BY MOUTH TWICE A DAY     Date Last Fill: 1/3/2020  Requested Pharmacy: [facility]  Submit electronically to pharmacy  Controlled Substance Agreement on file:   Encounter-Level CSA Scan Date - 07/05/2018:    Scan on 7/20/2018  1:47 PM        Last office visit:  6/15/2020

## 2021-06-09 NOTE — TELEPHONE ENCOUNTER
Last office visit: 01/13/2020  Last ordered: 01/13/2020  Last lab check: N/A   Next appointment: None.     Chart reviewed. Please review findings below.     ASSESMENT AND PLAN:  Diagnoses and all orders for this visit:     Neuropathy  Likely an exacerbation secondary to her previous discontinuation of Lyrica as detailed below.  Will restart the Lyrica and do some additional lab work-up as ordered below.  Reviewed the risks and benefits of the medication.  -     Glycosylated Hemoglobin A1c  -     Thyroid Cascade  -     Vitamin D, Total (25-Hydroxy)  -     pregabalin (LYRICA) 75 MG capsule; Take 1 capsule (75 mg total) by mouth 2 (two) times a day.  Dispense: 60 capsule; Refill: 11     Benign Essential Hypertension  Well controlled.  Medication review and counseling done, continue current plan.     Pulmonary fibrosis (H)  Stable.  Reviewed most recent pulmonary medicine consultation, encouraged the patient to follow-up with pulmonary medicine as directed.     Abnormal finding of blood chemistry, unspecified, history of blood sugars consistent with possible prediabetes   -     Glycosylated Hemoglobin A1c  -     Thyroid Cascade     Unspecified disorder of calcium metabolism   -     Vitamin D, Total (25-Hydroxy)V

## 2021-06-09 NOTE — TELEPHONE ENCOUNTER
ASSESMENT AND PLAN:  Diagnoses and all orders for this visit:     Neuropathy  Likely an exacerbation secondary to her previous discontinuation of Lyrica as detailed below.  Will restart the Lyrica and do some additional lab work-up as ordered below.  Reviewed the risks and benefits of the medication.  -     Glycosylated Hemoglobin A1c  -     Thyroid Cascade  -     Vitamin D, Total (25-Hydroxy)  -     pregabalin (LYRICA) 75 MG capsule; Take 1 capsule (75 mg total) by mouth 2 (two) times a day.  Dispense: 60 capsule; Refill: 11

## 2021-06-10 NOTE — TELEPHONE ENCOUNTER
Refill Approved    Rx renewed per Medication Renewal Policy. Medication was last renewed on 8/15/19.    Alena Jeffers, [facility] Triage/Med Refill 8/4/2020     Requested Prescriptions   Pending Prescriptions Disp Refills     omeprazole (PRILOSEC) 20 MG capsule [Pharmacy Med Name: OMEPRAZOLE DR 20 MG CAPSULE] 90 capsule 3     Sig: TAKE 1 CAPSULE BY MOUTH EVERY DAY       GI Medications Refill Protocol Passed - 8/4/2020 12:25 AM        Passed - PCP or prescribing provider visit in last 12 or next 3 months.     Last office visit with prescriber/PCP: 1/13/2020 Agustín Craft MD OR same dept: 6/15/2020 Onel Godinez MD OR same specialty: 6/15/2020 Onel Godinez MD  Last physical: 11/1/2017 Last MTM visit: Visit date not found   Next visit within 3 mo: Visit date not found  Next physical within 3 mo: Visit date not found  Prescriber OR PCP: Agustín Craft MD  Last diagnosis associated with med order: 1. GERD (gastroesophageal reflux disease)  - omeprazole (PRILOSEC) 20 MG capsule [Pharmacy Med Name: OMEPRAZOLE DR 20 MG CAPSULE]; TAKE 1 CAPSULE BY MOUTH EVERY DAY  Dispense: 90 capsule; Refill: 3    If protocol passes may refill for 12 months if within 3 months of last provider visit (or a total of 15 months).

## 2021-06-10 NOTE — PROGRESS NOTES
Date of Surgery: 05/16/2017 Lt Eye. 05/30/2017 Rt Eye  Type of Surgery: Cataract Surgery  Surgeon:   Location: [facility]  Fax: [phone]    77-year-old female here for preoperative evaluation for upcoming cataract surgery.  She has had slow increasing blurriness of her vision in both eyes over the past year.  Patient had recent evaluation for shortness of breath on exertion.  This included negative cardiac stress testing and pulmonary medicine consultation.  She feels that overall, her shortness of breath with exertion is improving slowly.  We reviewed the results of her recent pulmonary medicine consultation, there was no concern for any significant pulmonary fibrosis.  We reviewed the results of her CT scan and of her cardiac stress testing which was negative for ischemia and showed good ejection fraction.      No new or out of the ordinary symptoms.  She does continue to deal with the stress of the progressive dementia of her .  Counseling done today around techniques to help with this.    Please see the scanned preoperative form for further details.    She will hold her aspirin for 1 week prior to her cataract surgeries.    Over 25 minutes of total time, more than half in counseling and coronation of care on the above.

## 2021-06-10 NOTE — TELEPHONE ENCOUNTER
Controlled Substance Refill Request  Medication Name:   Requested Prescriptions     Pending Prescriptions Disp Refills     pregabalin (LYRICA) 75 MG capsule [Pharmacy Med Name: PREGABALIN 75 MG CAPSULE] 60 capsule 0     Sig: TAKE 1 CAPSULE BY MOUTH TWICE A DAY     Date Last Fill: 7/13/20  Requested [facility]  Submit electronically to pharmacy  Controlled Substance Agreement on file:   Encounter-Level CSA Scan Date - 07/05/2018:    Scan on 7/20/2018  1:47 PM        Last office visit:  6/115/20

## 2021-06-10 NOTE — PROGRESS NOTES
Assessment/Plan:        Diagnoses and all orders for this visit:    Seasonal allergic rhinitis due to pollen    Dyspnea on exertion  -     CT Chest High Resolution Without Contrast; Future; Expected date: 4/17/17    Pulmonary infiltrates on CXR  -     CT Chest High Resolution Without Contrast; Future; Expected date: 4/17/17    Diffusion capacity of lung (dl), decreased  -     CT Chest High Resolution Without Contrast; Future; Expected date: 4/17/17     77-year-old female with dyspnea in the setting of recent bronchitis.  Pulmonary infiltrates.  She does not have evidence of COPD on spirometry.  I do not think addition of inhalers at this time is necessary.  There is a slight chance of some pulmonary fibrosis and so we will check chest CT scan.  She may simply have a chronic bronchitis or recurrent bronchitis type picture in the setting of bad allergies.  If her CT is negative we may be left with concentrating on her allergies for now.  She is deconditioned.  Regular exercise in whatever form is tolerated by her joints will be important to improving her respiratory status.    Your lung tests show no COPD or asthma.    Your CXR showed possible scarring.  We will do a high resolution chest CT to follow up.    In the meantime - exercise    -5-10 minutes daily - make a check box        Subjective:    Patient ID: Sara Ashton is a 77 y.o. female.    HPI Comments: Pneumonia before Christmas - dyspnea with infiltrate.  No sputum or cough.  Symptoms localized to her chest.  Got better with antibiotics.  Associated with shortness of breath.  Not associated with hemoptysis.    Bronchitis after new year.  Cough and sputum production.  It took months to resolve.  Multiple course of antibiotics.  Nothing seemed to make it better or worse.  Symptoms localized to the chest.  She has never had an episode like this before.    Dyspnea.  Worse with exertion.  Better with rest.  Localizes to the chest.  Associated with cough and sputum  production.  Pertinent negatives include hemoptysis.    At this point all of the above symptoms have resolved except for her dyspnea.        Review of Systems  Review of systems positive for activity change fatigue fever congestion nasal discharge sinus pressure postnasal drip tinnitus shortness of breath eye redness light sensitivity visual disturbance joint pain muscle pain environmental allergies easy bruising.  The remainder of 14 system review of system was negative.        Objective:    Physical Exam   Constitutional: She is oriented to person, place, and time. She appears well-developed and well-nourished. No distress.   HENT:   Head: Normocephalic and atraumatic.   Nose: Nose normal.   Mouth/Throat: Oropharynx is clear and moist. No oropharyngeal exudate.   Eyes: Conjunctivae are normal. Pupils are equal, round, and reactive to light. Right eye exhibits no discharge. Left eye exhibits no discharge. No scleral icterus.   Neck: Normal range of motion. No JVD present. No tracheal deviation present. No thyromegaly present.   Cardiovascular: Normal rate, regular rhythm and normal heart sounds.  Exam reveals no gallop.    No murmur heard.  Pulmonary/Chest: Effort normal and breath sounds normal. No stridor. No respiratory distress. She has no wheezes.   Abdominal: Soft. She exhibits no distension. There is no tenderness.   Musculoskeletal: She exhibits no edema or deformity.   Lymphadenopathy:     She has no cervical adenopathy.   Neurological: She is alert and oriented to person, place, and time. No cranial nerve deficit. Coordination normal.   Skin: Skin is warm and dry. She is not diaphoretic. No erythema.   Psychiatric: She has a normal mood and affect. Her behavior is normal. Thought content normal.   Nursing note and vitals reviewed.          Current Outpatient Prescriptions on File Prior to Visit   Medication Sig Dispense Refill     acetaminophen (TYLENOL ARTHRITIS) 650 MG CR tablet Take 650 mg by mouth 4  (four) times a day as needed for pain.       albuterol (PROAIR HFA) 90 mcg/actuation inhaler Inhale 1-2 puffs every 4 (four) hours as needed for wheezing or shortness of breath (cough). 1 Inhaler 3     ALLERGY AND CONGESTION RELIEF  mg per 24 hr tablet TAKE ONE TABLET BY MOUTH ONE TIME DAILY 90 tablet 1     amitriptyline (ELAVIL) 25 MG tablet TAKE ONE TABLET BY MOUTH NIGHTLY AT BEDTIME 60 tablet 3     aspirin 81 MG EC tablet Take 81 mg by mouth 3 (three) times a week.       CA CARB & GLUC/MAG OX & GLUC (CALCIUM MAGNESIUM ORAL) Take by mouth. 600-100-300 liquid       cholecalciferol, vitamin D3, (VITAMIN D3) 2,000 unit Tab Take 1 tablet by mouth daily.       ECHINACEA ORAL Take 1 capsule by mouth daily.       FLUoxetine (PROZAC) 20 MG tablet TAKE 1/2 TABLET ONCE DAILY WHILE WEANING DOWN OFF THE WELLBUTRIN THEN ONCE DAILY 30 tablet 6     fluticasone (FLONASE) 50 mcg/actuation nasal spray 2 sprays into each nostril daily. 48 g 3     FOLIC ACID/MULTIVITS-MIN/LUT (CENTRUM SILVER ORAL) Take 1 tablet by mouth 3 (three) times a day.       ginkgo biloba 40 mg Tab Take 1 tablet by mouth daily.       GLUCOSAMINE SULFATE (GLUCOSAMINE ORAL) Take 2 capsules by mouth daily.       HYDROcodone-acetaminophen 5-325 mg per tablet TAKE ONE TABLET BY MOUTH TWICE DAILY AS NEEDED FOR PAIN 60 tablet 0     LACTOBACILLUS ACIDOPHILUS (PROBIOTIC ORAL) Take by mouth daily.       lisinopril-hydrochlorothiazide (PRINZIDE,ZESTORETIC) 20-12.5 mg per tablet TAKE ONE TABLET BY MOUTH ONE TIME DAILY 90 tablet 1     LYRICA 100 mg capsule TAKE 1 CAPSULE (100 MG TOTAL) BY MOUTH 2 TIMES A DAY. 60 capsule 1     meloxicam (MOBIC) 15 MG tablet TAKE 1 TABLET BY MOUTH EVERY DAY 90 tablet 1     methocarbamol (ROBAXIN) 750 MG tablet Take 1 tablet (750 mg total) by mouth every 8 (eight) hours as needed. 90 tablet 0     olopatadine (PAZEO) 0.7 % Drop Apply 1 drop to eye Daily at 8:00 am..       omeprazole (PRILOSEC) 20 MG capsule TAKE ONE CAPSULE BY MOUTH ONE  "TIME DAILY 90 capsule 3     traZODone (DESYREL) 150 MG tablet TAKE ONE TABLET BY MOUTH NIGHTLY AT BEDTIME 90 tablet 3     No current facility-administered medications on file prior to visit.      /74  Pulse 77  Resp 18  Ht 5' 2\" (1.575 m)  Wt 191 lb (86.6 kg)  SpO2 97% Comment: RA  BMI 34.93 kg/m2    Medical History  Active Ambulatory (Non-Hospital) Problems    Diagnosis     Chest pain on breathing     Neuropathy     Osteoarthritis, multiple sites     GERD (gastroesophageal reflux disease)     Acute sinusitis      Esophageal Reflux     Osteoporosis     Obesity     Anxiety     Diverticulosis     Benign Essential Hypertension     Osteopenia     Dermatitis     Hyperlipidemia     Pain During Urination (Dysuria)     Recurrent Major Depression In Partial Remission     Idiopathic Peripheral Neuropathy     Lower Back Pain     Cellulitis     Allergic Rhinitis     Localized Primary Osteoarthritis Of The Knee     Restless Legs Syndrome     No past medical history on file.     Surgical History  She  has a past surgical history that includes total knee arthroplasty; removal of heel bone; and total knee arthroplasty.       Social History  Reviewed, and she  reports that she has quit smoking. She has a 12.50 pack-year smoking history. She has never used smokeless tobacco. She reports that she drinks alcohol.    Busy with doing things around town.   Allergies  No Known Allergies Family History  Reviewed, and family history includes Heart attack in her mother. father with emphysema.  Tobacco use.  Brother with emphysema.                            Data Review - imaging, labs, and ekgs listed below were reviewed by me.  Chest XRay and chest CT images and EKG tracings interpreted personally.     Past Labs  Appointment on 04/17/2017   Component Date Value     Hgb 04/17/2017 11.0    Hospital Outpatient Visit on 03/08/2017   Component Date Value     Pharmacologic Protocol  03/08/2017 Lexiscan      Test Type 03/08/2017 " Pharmacological      Baseline HR 03/08/2017 72      Baseline BP 03/08/2017 120/52      Last Stress HR 03/08/2017 84      Last Stress BP 03/08/2017 145/63      PERCENT HR 03/08/2017 85%      ST Deviation Elevation 03/08/2017  mm      Deviation Time 03/08/2017 II -0.3mm      ST Elevation Amount 03/08/2017 II 0.5mm      ST Deviation Amount he 03/08/2017 aVR -0.5mm      Final Resting BP 03/08/2017 115/60      Final Resting HR 03/08/2017 79      Max Treadmill Speed 03/08/2017 0.0      Max Treadmill Grade 03/08/2017 0.0      Peak Systolic BP 03/08/2017 145/63      Peak Diastolic BP 03/08/2017 121/64      Max HR 03/08/2017 92      Stress Phase 03/08/2017 Resting      Stress Resting Pt Positi* 03/08/2017 MANUAL EVENT      Current HR 03/08/2017 70      Current BP 03/08/2017 120/52      Stress Phase 03/08/2017 Stress      Stage Minute 03/08/2017 EXE 00:00      Exercise Stage 03/08/2017 STAGE 2      Current HR 03/08/2017 74      Current BP 03/08/2017 120/52      Stress Phase 03/08/2017 Stress      Stage Minute 03/08/2017 EXE 01:00      Exercise Stage 03/08/2017 STAGE 3      Current HR 03/08/2017 70      Current BP 03/08/2017 120/52      Stress Phase 03/08/2017 Stress      Stage Minute 03/08/2017 EXE 02:00      Exercise Stage 03/08/2017 STAGE 4      Current HR 03/08/2017 92      Current BP 03/08/2017 120/52      Stress Phase 03/08/2017 Stress      Stage Minute 03/08/2017 EXE 02:16      Exercise Stage 03/08/2017 STAGE 4      Current HR 03/08/2017 90      Current BP 03/08/2017 121/64      Stress Phase 03/08/2017 Stress      Stage Minute 03/08/2017 EXE 03:00      Exercise Stage 03/08/2017 STAGE 5      Current HR 03/08/2017 88      Current BP 03/08/2017 121/64      Stress Phase 03/08/2017 Stress      Stage Minute 03/08/2017 EXE 03:31      Exercise Stage 03/08/2017 STAGE 5      Current HR 03/08/2017 87      Current BP 03/08/2017 145/63      Stress Phase 03/08/2017 Stress      Stage Minute 03/08/2017 EXE 04:00      Exercise Stage  03/08/2017 STAGE 6      Current HR 03/08/2017 84      Current BP 03/08/2017 145/63      Stress Phase 03/08/2017 Stress      Stage Minute 03/08/2017 EXE 04:00      Exercise Stage 03/08/2017 STAGE 6      Current HR 03/08/2017 84      Current BP 03/08/2017 145/63      Stress Phase 03/08/2017 Recovery      Stage Minute 03/08/2017 REC 00:58      Exercise Stage 03/08/2017 Recovery      Current HR 03/08/2017 80      Current BP 03/08/2017 142/56      Stress Phase 03/08/2017 Recovery      Stage Minute 03/08/2017 REC 00:59      Exercise Stage 03/08/2017 Recovery      Current HR 03/08/2017 80      Current BP 03/08/2017 142/56      Stress Phase 03/08/2017 Recovery      Stage Minute 03/08/2017 REC 01:59      Exercise Stage 03/08/2017 Recovery      Current HR 03/08/2017 82      Current BP 03/08/2017 142/56      Stress Phase 03/08/2017 Recovery      Stage Minute 03/08/2017 REC 02:32      Exercise Stage 03/08/2017 Recovery      Current HR 03/08/2017 81      Current BP 03/08/2017 115/60      Stress Phase 03/08/2017 Recovery      Stage Minute 03/08/2017 REC 02:59      Exercise Stage 03/08/2017 Recovery      Current HR 03/08/2017 78      Current BP 03/08/2017 115/60      Stress Phase 03/08/2017 Recovery      Stage Minute 03/08/2017 REC 03:59      Exercise Stage 03/08/2017 Recovery      Current HR 03/08/2017 82      Current BP 03/08/2017 115/60      Stress Phase 03/08/2017 Recovery      Stage Minute 03/08/2017 REC 04:16      Exercise Stage 03/08/2017 Recovery      Current HR 03/08/2017 79      Current BP 03/08/2017 115/60      Calculated Percent HR 03/08/2017 63      Left Ventricular EF 03/08/2017 75    Office Visit on 03/01/2017   Component Date Value     Sodium 03/01/2017 141      Potassium 03/01/2017 4.9      Chloride 03/01/2017 108*     CO2 03/01/2017 22      Anion Gap, Calculation 03/01/2017 11      Glucose 03/01/2017 122      BUN 03/01/2017 19      Creatinine 03/01/2017 0.92      GFR MDRD Af Amer 03/01/2017 >60      GFR MDRD Non  Af Amer 03/01/2017 60*     Bilirubin, Total 03/01/2017 0.2      Calcium 03/01/2017 9.9      Protein, Total 03/01/2017 6.6      Albumin 03/01/2017 3.3*     Alkaline Phosphatase 03/01/2017 81      AST 03/01/2017 25      ALT 03/01/2017 29      WBC 03/01/2017 7.1      RBC 03/01/2017 4.01      Hemoglobin 03/01/2017 11.2*     Hematocrit 03/01/2017 33.3*     MCV 03/01/2017 83      MCH 03/01/2017 28.0      MCHC 03/01/2017 33.7      RDW 03/01/2017 14.6*     Platelets 03/01/2017 244      MPV 03/01/2017 7.8        Past Imaging  Nm Pharmacologic Stress Test    Result Date: 3/8/2017    There is no prior study available.   The left ventricular ejection fraction is 75%.   Lexiscan stress ECG is negative for ischemia.   Lexiscan nuclear study is negative for inducible myocardial ischemia. No previous myocardial infarction.          CXR images reviewed by me - R basilar haziness and possible increased reticular markings,    PFT raw data and tracings reviewed by me.  Interpreted by me.  FEV1 and FVC symmetrically reduced with preserved ratio.  No response to bronchodilators.  TLC is normal.  DLCO corrected is at low range of normal.  Assessment: Spirometry suggestive of restriction but not confirmed on lung volumes.  Borderline low diffusion capacity.

## 2021-06-11 NOTE — TELEPHONE ENCOUNTER
olopatadine (PATANOL) 0.1 % ophthalmic solution [[id number]]     Electronically signed by: Alena Jeffers RN on 02/07/19 7500  Status: Discontinued    Ordering user: Alena Jeffers RN 02/07/19 1620  Ordering provider: Agustín Craft MD    Authorized by: Agustín Craft MD    Frequency: BID 02/07/19 - 03/20/19  Discontinued by: Agustín Craft MD 03/20/19 1454

## 2021-06-12 NOTE — PATIENT INSTRUCTIONS - HE
Sent to the ER for new onset dizziness x 1 day. Needs a higher level of care that we can offer. Patient verbalized understanding.  will drive her to [place].

## 2021-06-12 NOTE — TELEPHONE ENCOUNTER
CMT cannot find appropriate slot per patient request. Can MD fit into schedule or must keep scheduled date? Thank you.    17-Oct-2020 11:00

## 2021-06-12 NOTE — TELEPHONE ENCOUNTER
New Appointment Needed  What is the reason for the visit:    Inpatient/ED Follow Up Appt Request  At what hospital or facility were you seen?: [facility]  What is the reason you were seen?: Dizziness  What date were you admitted?: date: 11/5  What date were you discharged?: date: 11/5  What was the recommended timeframe for your follow up appointment?: one week  Provider Preference: PCP only  How soon do you need to be seen?: next week, Her  is coming in on the 10th and she was hoping to come in at that time. She is currently scheduled for 11/16 and did not want to see another provider.  Waitlist offered?: No  Okay to leave a detailed message:  Yes

## 2021-06-12 NOTE — TELEPHONE ENCOUNTER
RN cannot approve Refill Request    RN can NOT refill this medication med is not covered by policy/route to provider. Last office visit: 1/13/2020 Agustín Craft MD Last Physical: 11/1/2017 Last MTM visit: Visit date not found Last visit same specialty: 6/15/2020 Onel Godinez MD.  Next visit within 3 mo: Visit date not found  Next physical within 3 mo: Visit date not found      Afsaneh Barrera, [facility] Triage/Med Refill 11/7/2020    Requested Prescriptions   Pending Prescriptions Disp Refills     meloxicam (MOBIC) 15 MG tablet [Pharmacy Med Name: MELOXICAM 15 MG TABLET] 90 tablet 1     Sig: TAKE 1 TABLET BY MOUTH EVERY DAY       There is no refill protocol information for this order

## 2021-06-12 NOTE — TELEPHONE ENCOUNTER
Refill Approved    Rx renewed per Medication Renewal Policy. Medication was last renewed on 11/18/19.    Alena Jeffers, [facility] Connection Triage/Med Refill 11/5/2020     Requested Prescriptions   Pending Prescriptions Disp Refills     fluticasone propionate (FLONASE) 50 mcg/actuation nasal spray [Pharmacy Med Name: FLUTICASONE PROP 50 MCG SPRAY] 16 g 3     Sig: INSTILL 2 SPRAYS IN EACH NOSTRIL ONE TIME DAILY       Nasal Steroid Refill Protocol Passed - 11/5/2020  3:27 PM        Passed - Patient has had office visit/physical in last 2 years     Last office visit with prescriber/PCP: 1/13/2020 OR same dept: 6/15/2020 Onel Godinez MD OR same specialty: 6/15/2020 Onel Godinez MD Last physical: Visit date not found Last MTM visit: Visit date not found    Next appt within 3 mo: Visit date not found  Next physical within 3 mo: Visit date not found  Prescriber OR PCP: Agustín Craft MD  Last diagnosis associated with med order: 1. Allergic rhinitis  - fluticasone propionate (FLONASE) 50 mcg/actuation nasal spray [Pharmacy Med Name: FLUTICASONE PROP 50 MCG SPRAY]; INSTILL 2 SPRAYS IN EACH NOSTRIL ONE TIME DAILY  Dispense: 16 g; Refill: 3     If protocol passes may refill for 12 months if within 3 months of last provider visit (or a total of 15 months).

## 2021-06-12 NOTE — PROGRESS NOTES
Sent to the ER for new onset dizziness x 1 day. Needs a higher level of care that we can offer. Patient verbalized understanding.  will drive her to [place].

## 2021-06-13 NOTE — TELEPHONE ENCOUNTER
Last office visit: 01/13/2020 Leg pain, neuropathy  MTM phone visit in March 2020  Last ordered: 08/11/2020 #60, R-3  Last lab check: BMP 11/16/2020  Component      Latest Ref Rng & Units 11/5/2020 11/16/2020   Sodium      136 - 145 mmol/L 141 142   Potassium      3.5 - 5.0 mmol/L 5.1 (H) 5.0   Chloride      98 - 107 mmol/L 112 (H) 110 (H)   CO2      22 - 31 mmol/L 20 (L) 21 (L)   Anion Gap, Calculation      5 - 18 mmol/L 9 11   Glucose      70 - 125 mg/dL 93 118   Calcium      8.5 - 10.5 mg/dL 9.8 9.6   BUN      8 - 28 mg/dL 31 (H) 30 (H)   Creatinine      0.60 - 1.10 mg/dL 1.41 (H) 1.50 (H)   GFR MDRD Af Amer      >60 mL/min/1.73m2 44 (L) 41 (L)   GFR MDRD Non Af Amer      >60 mL/min/1.73m2 36 (L) 33 (L)     Next appointment: No upcoming with Dr. Craft    Chart reviewed. Please review findings below.   ASSESMENT AND PLAN:  Diagnoses and all orders for this visit:     Neuropathy  Likely an exacerbation secondary to her previous discontinuation of Lyrica as detailed below.  Will restart the Lyrica and do some additional lab work-up as ordered below.  Reviewed the risks and benefits of the medication.  -     Glycosylated Hemoglobin A1c  -     Thyroid Cascade  -     Vitamin D, Total (25-Hydroxy)  -     pregabalin (LYRICA) 75 MG capsule; Take 1 capsule (75 mg total) by mouth 2 (two) times a day.  Dispense: 60 capsule; Refill: 11

## 2021-06-13 NOTE — TELEPHONE ENCOUNTER
Controlled Substance Refill Request  Medication Name:   Requested Prescriptions     Pending Prescriptions Disp Refills     pregabalin (LYRICA) 75 MG capsule [Pharmacy Med Name: PREGABALIN 75 MG CAPSULE] 60 capsule 3     Sig: TAKE 1 CAPSULE BY MOUTH TWICE A DAY     Date Last Fill: 8/11/20  Requested Pharmacy: [facility]  Submit electronically to pharmacy  Controlled Substance Agreement on file:   Encounter-Level CSA Scan Date - 07/05/2018:    Scan on 7/20/2018  1:47 PM        Last office visit:  11/16/20

## 2021-06-13 NOTE — PROGRESS NOTES
Sara Ashton is a 77 y.o. female who presents for an annual exam. The patient reports that there is not domestic violence in her life.      ASSESMENT AND PLAN:    Physical, Health Maitenence -   Reviewed healthy lifestyle, diet, exercise, vitamins, and follow-up plan today with patient.  Reviewed age appropriate cancer and other screening recommendations.  Immunization review and update done.  Reviewed indicated lab tests, see lab orders.       Lower Back Pain  -     methocarbamol (ROBAXIN) 750 MG tablet; TAKE 1 TABLET (750 MG TOTAL) BY MOUTH EVERY 8 (EIGHT) HOURS AS NEEDED.  Dispense: 90 tablet; Refill: 0    Neuropathy  -     HYDROcodone-acetaminophen 5-325 mg per tablet; TAKE ONE TABLET BY MOUTH TWICE DAILY AS NEEDED FOR PAIN  Dispense: 60 tablet; Refill: 0    Other orders  -     olopatadine (PAZEO) 0.7 % Drop; Apply 1 drop to eye Daily at 8:00 am..  Dispense: 2.5 mL; Refill: 6          HPI: 77-year-old female comes in today for her physical.  She also needs refills on her medication that she uses for chronic back pain.  Pain has been stable and under fair control with her current regiment of pain medication and muscle relaxers.  She had successful cataract surgery in both eyes, she needs a refill on her eyedrops.    ROS: No chest pain, no shortness of breath, no skin lesions that have been changing, no blood in the urine, no blood in the stool, remainder of review of systems is as above are negative.    No past medical history on file.    Current Outpatient Prescriptions   Medication Sig Dispense Refill     acetaminophen (TYLENOL ARTHRITIS) 650 MG CR tablet Take 650 mg by mouth 4 (four) times a day as needed for pain.       albuterol (PROAIR HFA) 90 mcg/actuation inhaler Inhale 1-2 puffs every 4 (four) hours as needed for wheezing or shortness of breath (cough). 1 Inhaler 3     ALLERGY AND CONGESTION RELIEF  mg per 24 hr tablet TAKE ONE TABLET BY MOUTH ONE TIME DAILY 90 tablet 1     amitriptyline (ELAVIL)  25 MG tablet TAKE ONE TABLET BY MOUTH NIGHTLY AT BEDTIME 60 tablet 3     aspirin 81 MG EC tablet Take 81 mg by mouth 3 (three) times a week.       CA CARB & GLUC/MAG OX & GLUC (CALCIUM MAGNESIUM ORAL) Take by mouth. 600-100-300 liquid       cetirizine-pseudoephedrine (ZYRTEC-D) 5-120 mg per tablet Take 1 tablet by mouth daily. 30 tablet 6     cholecalciferol, vitamin D3, (VITAMIN D3) 2,000 unit Tab Take 1 tablet by mouth daily.       ECHINACEA ORAL Take 1 capsule by mouth daily.       FLUoxetine (PROZAC) 20 MG tablet TAKE 1 TABLET (20 MG TOTAL) BY MOUTH DAILY. 30 tablet 2     fluticasone (FLONASE) 50 mcg/actuation nasal spray INSTILL 2 SPRAYS IN EACH NOSTRIL ONE TIME DAILY 48 g 1     FOLIC ACID/MULTIVITS-MIN/LUT (CENTRUM SILVER ORAL) Take 1 tablet by mouth 3 (three) times a day.       ginkgo biloba 40 mg Tab Take 1 tablet by mouth daily.       GLUCOSAMINE SULFATE (GLUCOSAMINE ORAL) Take 2 capsules by mouth daily.       HYDROcodone-acetaminophen 5-325 mg per tablet TAKE ONE TABLET BY MOUTH TWICE DAILY AS NEEDED FOR PAIN 60 tablet 0     LACTOBACILLUS ACIDOPHILUS (PROBIOTIC ORAL) Take by mouth daily.       lisinopril-hydrochlorothiazide (PRINZIDE,ZESTORETIC) 20-12.5 mg per tablet TAKE 1 TABLET BY MOUTH EVERY DAY 90 tablet 2     LYRICA 100 mg capsule TAKE 1 CAPSULE (100 MG TOTAL) BY MOUTH 2 TIMES A DAY. 60 capsule 1     meloxicam (MOBIC) 15 MG tablet TAKE 1 TABLET BY MOUTH EVERY DAY 90 tablet 1     meloxicam (MOBIC) 15 MG tablet TAKE 1 TABLET BY MOUTH EVERY DAY 90 tablet 1     methocarbamol (ROBAXIN) 750 MG tablet TAKE 1 TABLET (750 MG TOTAL) BY MOUTH EVERY 8 (EIGHT) HOURS AS NEEDED. 90 tablet 0     olopatadine (PAZEO) 0.7 % Drop Apply 1 drop to eye Daily at 8:00 am.. 2.5 mL 6     omeprazole (PRILOSEC) 20 MG capsule TAKE ONE CAPSULE BY MOUTH ONE TIME DAILY 90 capsule 3     traZODone (DESYREL) 150 MG tablet TAKE ONE TABLET BY MOUTH NIGHTLY AT BEDTIME 90 tablet 3     No current facility-administered medications for this  "visit.        Patient Active Problem List   Diagnosis     Esophageal Reflux     Osteoporosis     Obesity     Anxiety     Diverticulosis     Benign Essential Hypertension     Osteopenia     Dermatitis     Hyperlipidemia     Pain During Urination (Dysuria)     Recurrent Major Depression In Partial Remission     Idiopathic Peripheral Neuropathy     Lower Back Pain     Cellulitis     Allergic Rhinitis     Localized Primary Osteoarthritis Of The Knee     Restless Legs Syndrome     Acute sinusitis      GERD (gastroesophageal reflux disease)     Neuropathy     Osteoarthritis, multiple sites     Chest pain on breathing       Social History     Social History     Marital status:      Spouse name: N/A     Number of children: N/A     Years of education: N/A     Social History Main Topics     Smoking status: Former Smoker     Packs/day: 0.50     Years: 25.00     Smokeless tobacco: Never Used      Comment: quit 25 yrs ago     Alcohol use Yes      Comment: occasional     Drug use: None     Sexual activity: Not Asked     Other Topics Concern     None     Social History Narrative       History   Smoking Status     Former Smoker     Packs/day: 0.50     Years: 25.00   Smokeless Tobacco     Never Used     Comment: quit 25 yrs ago       OBJECTICE: /62 (Patient Site: Left Arm, Patient Position: Sitting, Cuff Size: Adult Regular)  Pulse 72  Temp 97.7  F (36.5  C) (Oral)   Resp 18  Ht 5' 1.75\" (1.568 m)  Wt 193 lb (87.5 kg)  SpO2 98%  Breastfeeding? No  BMI 35.59 kg/m2      Gen - alert, orientated, NAD  Eyes - fundascopic exam limited by the undialated pupil but looks symmetric  ENT - oropharynx clear, TMs clear  Neck - supple, no palpable mass or lymphadenopathy  CV - RRR, no murmur  Resp - lungs CTA  Ab - soft, nontender, no palpable mass or organomegaly  Extrem - warm, no edema  Neuro - CN II-XII intact, strength, sensation, reflexes intact and symmetric  Skin - no rash.  No atypical appearing lesions seen. "       Agustín RIZO Dermott   2:23 PM 11/1/2017

## 2021-06-13 NOTE — TELEPHONE ENCOUNTER
Refill Approved    Rx renewed per Medication Renewal Policy. Medication was last renewed on 6/2/20.    Alena Jeffers, [facility] Triage/Med Refill 11/10/2020     Requested Prescriptions   Pending Prescriptions Disp Refills     lisinopriL-hydrochlorothiazide (PRINZIDE,ZESTORETIC) 20-12.5 mg per tablet [Pharmacy Med Name: LISINOPRIL-HCTZ 20-12.5 MG TAB] 90 tablet 1     Sig: TAKE 1 TABLET BY MOUTH EVERY DAY       Diuretics/Combination Diuretics Refill Protocol  Passed - 11/8/2020  9:29 AM        Passed - Visit with PCP or prescribing provider visit in past 12 months     Last office visit with prescriber/PCP: 1/13/2020 Agustín Craft MD OR same dept: 6/15/2020 Onel Godinez MD OR same specialty: 6/15/2020 Onel Godinez MD  Last physical: 11/1/2017 Last MTM visit: Visit date not found   Next visit within 3 mo: Visit date not found  Next physical within 3 mo: Visit date not found  Prescriber OR PCP: Agustín Craft MD  Last diagnosis associated with med order: 1. HTN (hypertension)  - lisinopriL-hydrochlorothiazide (PRINZIDE,ZESTORETIC) 20-12.5 mg per tablet [Pharmacy Med Name: LISINOPRIL-HCTZ 20-12.5 MG TAB]; TAKE 1 TABLET BY MOUTH EVERY DAY  Dispense: 90 tablet; Refill: 1    If protocol passes may refill for 12 months if within 3 months of last provider visit (or a total of 15 months).             Passed - Serum Potassium in past 12 months      Lab Results   Component Value Date    Potassium 5.1 (H) 11/05/2020             Passed - Serum Sodium in past 12 months      Lab Results   Component Value Date    Sodium 141 11/05/2020             Passed - Blood pressure on file in past 12 months     BP Readings from Last 1 Encounters:   11/05/20 (!) 163/91             Passed - Serum Creatinine in past 12 months      Creatinine   Date Value Ref Range Status   11/05/2020 1.41 (H) 0.60 - 1.10 mg/dL Final

## 2021-06-13 NOTE — PROGRESS NOTES
ASSESMENT AND PLAN:  Diagnoses and all orders for this visit:    Acute kidney injury (H)  Reviewed emergency room notes and lab results with the patient.  We are rechecking the BMP as ordered below.  Continue good oral hydration.  -     Basic Metabolic Panel    Tricuspid valve insufficiency, unspecified etiology  Reviewed her most recent echo results with her today because of the finding of murmur on her exam.  Her murmur is consistent with her known mild to moderate tricuspid insufficiency.  She also has mild aortic insufficiency.  Counseling done with the patient on this and will plan to repeat her echo again in about 2 years, sooner if problems.    Chronic obstructive pulmonary disease (H)  Patient currently just has a mild runny nose with no other symptoms and no signs of acute exacerbation of her COPD.  Her lung exam is normal.  Counseled the patient on the importance of having her albuterol available in case she were to have an exacerbation.    Major depressive disorder in partial remission, unspecified whether recurrent (H)  Clarified her fluoxetine dosing with her today, she is currently taking 20 mg/day and will continue on this as her symptoms are well controlled.  Recheck again in 6 months.  Sooner if problems.  -     FLUoxetine (PROZAC) 20 MG capsule; Take 1 capsule (20 mg total) by mouth daily.  Dispense: 90 capsule; Refill: 3        Reviewed the risks and benefits of the treatment plan with the patient and/or caregiver and we discussed indications for routine and emergent follow-up.        SUBJECTIVE: 80-year-old female here for follow-up on her recent emergency room visit where she was diagnosed with dehydration and mild acute kidney injury.  She is here today for follow-up.  Her lightheadedness has resolved completely.  She has been drinking fluids very well.  She is feeling back to her normal baseline.  She is due for follow-up blood testing for the acute kidney injury.  Patient has a history of  major depression.  However, she has been doing very well with her mood over the past year.  She reports that she titrated down her dosing of fluoxetine, it is listed as 60 mg/day but she has only been taking 20 mg/day for the last many months.  She feels like she is able to laugh and enjoy things in her daily life and like her mood is returned to her normal baseline.  She tolerates the medication well.  Patient has a history of COPD.  She has had some clear runny nose the last couple of weeks but no other symptoms including no fever and no shortness of breath and no cough.  She gets a good response to albuterol when she has to use it, which is rarely, she has not had to use her albuterol recently.    Past Medical History:   Diagnosis Date     Anxiety      Bronchitis with bronchospasm 6/11/2018     Depression      Diverticulitis      GERD (gastroesophageal reflux disease)      Hyperlipidemia      Hypertension      Osteoarthritis      Patient Active Problem List   Diagnosis     Osteoporosis     Obesity     Anxiety     Diverticulosis     Benign Essential Hypertension     Osteopenia     Dermatitis     Hyperlipidemia     Major depression     Lower Back Pain     Allergic Rhinitis     Localized Primary Osteoarthritis Of The Knee     Restless Legs Syndrome     GERD (gastroesophageal reflux disease)     Neuropathy     Osteoarthritis, multiple sites     Diastolic dysfunction     Hypoxia     Chronic obstructive pulmonary disease with acute exacerbation (H)     Pulmonary fibrosis (H)     Major depressive disorder in partial remission, unspecified whether recurrent (H)     Current Outpatient Medications   Medication Sig Dispense Refill     acetaminophen (TYLENOL ARTHRITIS) 650 MG CR tablet Take 650 mg by mouth 4 (four) times a day as needed for pain.       albuterol (PROVENTIL) 2.5 mg /3 mL (0.083 %) nebulizer solution Take 3 mL (2.5 mg total) by nebulization every 6 (six) hours as needed for wheezing. 75 vial 3     aspirin 81  MG EC tablet Take 81 mg by mouth daily.              calcium, as carbonate, (OS-JACKIE) 500 mg calcium (1,250 mg) tablet Take 1 tablet by mouth daily.       cholecalciferol, vitamin D3, (VITAMIN D3) 2,000 unit Tab Take 1 tablet by mouth daily.       fexofenadine (ALLEGRA) 180 MG tablet Take 180 mg by mouth daily.       FLUoxetine (PROZAC) 20 MG capsule Take 1 capsule (20 mg total) by mouth daily. 90 capsule 3     fluticasone propionate (FLONASE) 50 mcg/actuation nasal spray INSTILL 2 SPRAYS IN EACH NOSTRIL ONE TIME DAILY 48 g 3     furosemide (LASIX) 20 MG tablet Take 2 tablets (40 mg total) by mouth daily. 180 tablet 2     GLUCOSAMINE SULFATE (GLUCOSAMINE ORAL) Take 1 capsule by mouth daily.              hypromellose (ARTIFICIAL TEARS,[id number],) Drop Administer 1 drop to both eyes 4 (four) times a day as needed (dry eyes). 30 mL 11     LACTOBACILLUS ACIDOPHILUS (PROBIOTIC ORAL) Take by mouth daily.       levalbuterol (XOPENEX) 1.25 mg/3 mL nebulizer solution Take 3 mL every 4 hours as needed for Reactive Airway disease. 75 mL 6     lisinopriL-hydrochlorothiazide (PRINZIDE,ZESTORETIC) 20-12.5 mg per tablet TAKE 1 TABLET BY MOUTH EVERY DAY 90 tablet 1     meloxicam (MOBIC) 15 MG tablet TAKE 1 TABLET BY MOUTH EVERY DAY 90 tablet 1     nebulizer and compressor (VIOS AEROSOL DELIVERY SYSTEM) Kristina 1 nebulization every 6 hours as needed for wheezing. 1 each 0     omeprazole (PRILOSEC) 20 MG capsule TAKE 1 CAPSULE BY MOUTH EVERY DAY 90 capsule 3     oxybutynin (DITROPAN XL) 5 MG ER tablet TAKE 1 TABLET BY MOUTH EVERY DAY 90 tablet 3     pregabalin (LYRICA) 75 MG capsule TAKE 1 CAPSULE BY MOUTH TWICE A DAY 60 capsule 3     traZODone (DESYREL) 150 MG tablet Take 1 tablet (150 mg total) by mouth at bedtime. 90 tablet 3     VENTOLIN HFA 90 mcg/actuation inhaler INHALE 1-2 PUFFS EVERY FOUR HOURS AS NEEDED FOR WHEEZING, SHORTNESS OF BREATH, OR COUGH. 18 Inhaler 2     No current facility-administered medications for this visit.   "    Social History     Tobacco Use   Smoking Status Former Smoker     Packs/day: 0.50     Years: 25.00     Pack years: 12.50   Smokeless Tobacco Never Used   Tobacco Comment    quit 25 yrs ago       OBJECTICE: /48 (Patient Site: Left Arm, Patient Position: Sitting, Cuff Size: Adult Regular)   Pulse 82   Temp 98.6  F (37  C) (Oral)   Ht 5' 2.6\" (1.59 m)   Wt 189 lb (85.7 kg)   SpO2 92%   BMI 33.91 kg/m       No results found for this or any previous visit (from the past 24 hour(s)).     GEN-alert, appropriate, in no apparent distress   HEENT-mucous membranes are moist   CV-regular rate and rhythm with a grade 2 out of 6 systolic murmur   RESP-lungs clear to auscultation   EXTREM-warm with no pitting edema \   psychiatric-appearance is well-groomed, speech of normal fluency and rate, mood is not depressed, affect is normal, thought content negative for suicidal or homicidal ideation.   SKIN-no ulcers/vesicles      Agustín Craft"

## 2021-06-14 NOTE — PATIENT INSTRUCTIONS - HE
The ER doctor wants to see you in the emergency department.  Please go to [facility] emergency department immediately.      They will determine the cause of your rash and treat you for your foot infection.     Please park in one of the patient parking spots near the emergency room entrance.

## 2021-06-14 NOTE — PROGRESS NOTES
Subjective:      Patient ID: Sara Ashton is a 77 y.o. female.    Chief Complaint:    HPI Sara Ashton is a 77 y.o. female who presents today complaining of cough and sinus pressure and congestion x 2 weeks. She claims she has been blowing her nose with green and yellow phlegm.       Past Surgical History:   Procedure Laterality Date     DC REMOVAL OF HEEL BONE      Description: Ostectomy Calcaneus;  Proc Date: 12/01/2004;     DC TOTAL KNEE ARTHROPLASTY      Description: Total Knee Arthroplasty;  Proc Date: 06/01/2004;     DC TOTAL KNEE ARTHROPLASTY      Description: Total Knee Arthroplasty;  Proc Date: 06/22/2009;       Family History   Problem Relation Age of Onset     Heart attack Mother        Social History   Substance Use Topics     Smoking status: Former Smoker     Packs/day: 0.50     Years: 25.00     Smokeless tobacco: Never Used      Comment: quit 25 yrs ago     Alcohol use Yes      Comment: occasional       Review of Systems   Constitutional: Negative for fever.   HENT: Positive for congestion, ear pain, postnasal drip, sinus pain and sinus pressure. Negative for sore throat.    Respiratory: Positive for cough. Negative for shortness of breath and wheezing.    Gastrointestinal: Negative for abdominal pain, diarrhea, nausea and vomiting.       Objective:     /77  Pulse 82  Temp 97.4  F (36.3  C) (Oral)   Resp 20  Wt 195 lb 12.8 oz (88.8 kg)  SpO2 98% Comment: ra  Breastfeeding? No  BMI 36.1 kg/m2    Physical Exam   Constitutional: She appears well-developed and well-nourished. No distress.   HENT:   Head: Normocephalic and atraumatic.   Right Ear: Tympanic membrane, external ear and ear canal normal.   Left Ear: Tympanic membrane, external ear and ear canal normal.   Nose: Right sinus exhibits maxillary sinus tenderness. Right sinus exhibits no frontal sinus tenderness. Left sinus exhibits maxillary sinus tenderness. Left sinus exhibits no frontal sinus tenderness.   Cobblestoning present in  posterior pharynx.   Eyes: Conjunctivae are normal.   Cardiovascular: Normal rate, regular rhythm and normal heart sounds.  Exam reveals no gallop and no friction rub.    No murmur heard.  Pulmonary/Chest: Effort normal and breath sounds normal. No respiratory distress. She has no wheezes. She has no rales.   Skin: She is not diaphoretic.   Psychiatric: She has a normal mood and affect. Her behavior is normal. Judgment and thought content normal.   Nursing note and vitals reviewed.      Assessment:     Procedures    1. Sinusitis  doxycycline (MONODOX) 100 MG capsule         Patient Instructions   1) Increase fluids and rest  2) Try Mucinex and Neti pot over the counter for congestion  3) Take antibiotic as prescribed with food.   4) Follow up with primary care provider if you are not having any improvement in your symptoms over the next 7 days.

## 2021-06-15 NOTE — TELEPHONE ENCOUNTER
Disregard message below, I was not able to reach them. Endless ringing without forward to voicemail. If Audra calls back please let her know we were not forwarded to voicemail and there was no answer. Lasix sent to pharmacy.

## 2021-06-15 NOTE — TELEPHONE ENCOUNTER
Hello.      Recently an order was placed with [facility] to provide in home nursing for this patient.     Unfortunately at this time we are unable to accept this referral due to our capacity. We are also unable to vend this patient to another agency due to their limited capacity as well.      We have sent this patient information to our Clinic Care Transition team to see what they can do to assist with the situation.      We are so sorry for the inconvenience this causes. If you have any questions, please call our office at [phone].     Thank you,     Iza     [facility]

## 2021-06-15 NOTE — TELEPHONE ENCOUNTER
Refill Approved    Rx renewed per Medication Renewal Policy. Medication was last renewed on 3/6/20.    Cesar Haynes, [facility] Triage/Med Refill 3/3/2021     Requested Prescriptions   Pending Prescriptions Disp Refills     oxybutynin (DITROPAN XL) 5 MG ER tablet [Pharmacy Med Name: OXYBUTYNIN CL ER 5 MG TABLET] 90 tablet 3     Sig: TAKE 1 TABLET BY MOUTH EVERY DAY       Urinary Incontinence Medications Refill Protocol Passed - 3/3/2021 12:21 AM        Passed - PCP or prescribing provider visit in past 12 months       Last office visit with prescriber/PCP: 2/18/2021 Agustín Craft MD OR same dept: 2/18/2021 Agustín Craft MD OR same specialty: 2/18/2021 Agustín Craft MD  Last physical: 11/1/2017 Last MTM visit: Visit date not found   Next visit within 3 mo: Visit date not found  Next physical within 3 mo: Visit date not found  Prescriber OR PCP: Agustín Craft MD  Last diagnosis associated with med order: 1. Overactive bladder  - oxybutynin (DITROPAN XL) 5 MG ER tablet [Pharmacy Med Name: OXYBUTYNIN CL ER 5 MG TABLET]; TAKE 1 TABLET BY MOUTH EVERY DAY  Dispense: 90 tablet; Refill: 3    2. Female stress incontinence  - oxybutynin (DITROPAN XL) 5 MG ER tablet [Pharmacy Med Name: OXYBUTYNIN CL ER 5 MG TABLET]; TAKE 1 TABLET BY MOUTH EVERY DAY  Dispense: 90 tablet; Refill: 3    If protocol passes may refill for 12 months if within 3 months of last provider visit (or a total of 15 months).

## 2021-06-15 NOTE — PROGRESS NOTES
ASSESMENT AND PLAN:  Diagnoses and all orders for this visit:    Acute congestive heart failure, unspecified heart failure type (H)  Discussed in detail the dilemma with the patient and her daughter of using diuretics when patient had recent acute kidney injury as detailed below.  Patient had previously been on 40 mg daily of furosemide in the past, currently taking no furosemide. will start 20 mg daily.  Recheck in 10 days here in the clinic.  BMP at that time.  -     furosemide (LASIX) 20 MG tablet; Take 1 tablet (20 mg total) by mouth daily.  Dispense: 30 tablet; Refill: 1  -     Echo Complete; Future; Expected date: 02/25/2021  -     Ambulatory referral to Home Health    Acute kidney injury (H) with underlying stage 3 chronic kidney disease, unspecified whether stage 3a or 3b CKD  Reviewed the Hospital discharge summary and then the follow-up nephrology consultation with the patient and her daughter in detail.  Good improvement trend in her creatinine at the nephrology follow-up.  Unable to complete a kidney biopsy, see that note for details.  Will use gentle furosemide as detailed above and follow-up as detailed above.  Also follow-up with nephrology as directed.  -     Ambulatory referral to Home Health      Severe lymphedema of both lower extremities  Extensive counseling done with the patient and her daughter on the importance of getting elevation, compression, and diuretic use as detailed above.  Gauze wrap and Ace wrap done today from the toes up to the knee and instructions given to repeat this and continue with compression and elevation at home.  We will get the assistance from home care as soon as possible.  -     Ambulatory referral to Home Health    Generalized weakness  Patient struggling since discharge at home.  Patient and family are worried about her functioning at home.  Her  has moderately advanced dementia.  He is able to help some but in a limited capacity.  She definitely has medical  necessity for home care and will benefit from home nursing to help with the severe edema and associated leg skin care/wound care.  Also to help with medications and monitoring.  Home physical therapy for strengthening.  I think she should also benefit from a home PCA for basic caregiving.  I did  the patient's daughter that if she fails at home that she can take her back to the hospital and she may need placement in a care center.  However, I am optimistic that this will be necessary if we can get home care in place quickly.  -     Ambulatory referral to Home Health        Reviewed the risks and benefits of the treatment plan with the patient and/or caregiver and we discussed indications for routine and emergent follow-up.    50 minutes spent on the date of the encounter doing chart review, patient visit and documentation and face to face counseling on above.     SUBJECTIVE: Patient was hospitalized at [facility] in [place] for vasculitis and kidney failure, see those notes for details.  Discharged home and has not been doing well at home.  Patient acknowledges that she is feeling very weak and is having difficulty caring for herself.  Her  has moderately advanced dementia and struggles to help.  Her daughter has been helping but is feeling overwhelmed.  Patient is struggling to take care of herself and her activities of daily living.  She continues to have severe bilateral leg edema which is not improving.  She has not been able to elevate her legs and has not been able to do compression since discharge.  She is having shortness of breath with mild exertion but not having shortness of breath at rest.  Since discharge she has not had fevers or vomiting.    Past Medical History:   Diagnosis Date     Anxiety      Bronchitis with bronchospasm 6/11/2018     Depression      Diverticulitis      GERD (gastroesophageal reflux disease)      Hyperlipidemia      Hypertension       Osteoarthritis      Patient Active Problem List   Diagnosis     Osteoporosis     Obesity     Anxiety     Diverticulosis     Benign Essential Hypertension     Osteopenia     Dermatitis     Hyperlipidemia     Major depression     Lower Back Pain     Allergic Rhinitis     Localized Primary Osteoarthritis Of The Knee     Restless Legs Syndrome     GERD (gastroesophageal reflux disease)     Neuropathy     Osteoarthritis, multiple sites     Diastolic dysfunction     Hypoxia     Chronic obstructive pulmonary disease with acute exacerbation (H)     Pulmonary fibrosis (H)     Major depressive disorder in partial remission, unspecified whether recurrent (H)     Stage 3 chronic kidney disease, unspecified whether stage 3a or 3b CKD     Current Outpatient Medications   Medication Sig Dispense Refill     acetaminophen (TYLENOL ARTHRITIS) 650 MG CR tablet Take 650 mg by mouth 4 (four) times a day as needed for pain.       albuterol (PROVENTIL) 2.5 mg /3 mL (0.083 %) nebulizer solution Take 3 mL (2.5 mg total) by nebulization every 4 (four) hours as needed for wheezing. 75 vial 3     calcium, as carbonate, (OS-JACKIE) 500 mg calcium (1,250 mg) tablet Take 1 tablet by mouth daily.       cholecalciferol, vitamin D3, (VITAMIN D3) 2,000 unit Tab Take 1 tablet by mouth daily.       fexofenadine (ALLEGRA) 180 MG tablet Take 180 mg by mouth daily.       FLUoxetine (PROZAC) 20 MG capsule Take 1 capsule (20 mg total) by mouth daily. 90 capsule 3     fluticasone propionate (FLONASE) 50 mcg/actuation nasal spray INSTILL 2 SPRAYS IN EACH NOSTRIL ONE TIME DAILY 48 g 3     GLUCOSAMINE SULFATE (GLUCOSAMINE ORAL) Take 1 capsule by mouth daily.              LACTOBACILLUS ACIDOPHILUS (PROBIOTIC ORAL) Take by mouth daily.       omeprazole (PRILOSEC) 20 MG capsule TAKE 1 CAPSULE BY MOUTH EVERY DAY 90 capsule 3     oxybutynin (DITROPAN XL) 5 MG ER tablet TAKE 1 TABLET BY MOUTH EVERY DAY 90 tablet 3     pregabalin (LYRICA) 75 MG capsule TAKE 1 CAPSULE BY  "MOUTH TWICE A DAY 60 capsule 3     traZODone (DESYREL) 150 MG tablet Take 1 tablet (150 mg total) by mouth at bedtime. 90 tablet 3     VENTOLIN HFA 90 mcg/actuation inhaler INHALE 1-2 PUFFS EVERY FOUR HOURS AS NEEDED FOR WHEEZING, SHORTNESS OF BREATH, OR COUGH. 18 Inhaler 2     aspirin 81 MG EC tablet Take 81 mg by mouth daily.              furosemide (LASIX) 20 MG tablet Take 1 tablet (20 mg total) by mouth daily. 30 tablet 1     hypromellose (ARTIFICIAL TEARS,[id number],) Drop Administer 1 drop to both eyes 4 (four) times a day as needed (dry eyes). 30 mL 11     No current facility-administered medications for this visit.      Social History     Tobacco Use   Smoking Status Former Smoker     Packs/day: 0.50     Years: 25.00     Pack years: 12.50   Smokeless Tobacco Never Used   Tobacco Comment    quit 25 yrs ago       OBJECTICE: /40   Pulse 91   Temp 97.6  F (36.4  C) (Oral)   Resp 28   Ht 5' 1\" (1.549 m)   Wt 187 lb (84.8 kg)   SpO2 95%   BMI 35.33 kg/m       No results found for this or any previous visit (from the past 24 hour(s)).     GEN-alert, tired, in no acute distress   CV-regular rate and rhythm with a grade 2 out of 6 to 3 out of 6 systolic murmur   RESP-bibasilar crackles, no respiratory distress, upper lung fields are clear   EXTREM-severe pitting edema from the toes all the way up above the knees.   SKIN-fluid vesicles of the legs diffusely.  No signs of secondary infection.       Agustín Craft          "

## 2021-06-15 NOTE — ANESTHESIA CARE TRANSFER NOTE
Last vitals:   Vitals:    03/05/21 1326   BP: (!) 85/47   Pulse: (!) 105   Resp: 22   Temp:    SpO2:      Patient's level of consciousness is drowsy  Spontaneous respirations: yes  Maintains airway independently: yes  Dentition unchanged: yes  Oropharynx: oropharynx clear of all foreign objects    QCDR Measures:  ASA# 20 - Surgical Safety Checklist: WHO surgical safety checklist completed prior to induction    PQRS# 430 - Adult PONV Prevention: 4558F - Pt received => 2 anti-emetic agents (different classes) preop & intraop  ASA# 8 - Peds PONV Prevention: NA - Not pediatric patient, not GA or 2 or more risk factors NOT present  PQRS# 424 - Jess-op Temp Management: 4559F - At least one body temp DOCUMENTED => 35.5C or 95.9F within required timeframe  PQRS# 426 - PACU Transfer Protocol: - Transfer of care checklist used  ASA# 14 - Acute Post-op Pain: ASA14B - Patient did NOT experience pain >= 7 out of 10

## 2021-06-15 NOTE — TELEPHONE ENCOUNTER
I called several Home Health agencies but they were not accepting due to capacity. I called [facility] and per staff they are accepting for SN and PT. Informed dtr Audra and provided her with [facility]'s #. She will f/u with them. Order/Notes faxed to [facility], Phone: [phone].    FYI to PCP. Thanks.

## 2021-06-15 NOTE — ANESTHESIA PREPROCEDURE EVALUATION
Anesthesia Evaluation      Patient summary reviewed   No history of anesthetic complications     Airway   Mallampati: II  Neck ROM: full   Pulmonary    (+) COPD, asthma  shortness of breath, decreased breath sounds, a smoker  (-) pneumonia, recent URI    ROS comment: Pulmonary fibrosis                         Cardiovascular   Exercise tolerance: < 4 METS  (+) hypertension, dysrhythmias (frequent PACs), , hypercholesterolemia,     Rhythm: regular  Rate: normal,         Neuro/Psych    (+) neuromuscular disease,      Endo/Other    (+) obesity,      GI/Hepatic/Renal    (+) GERD,   chronic renal disease CRI,           Dental    (+) lower dentures and upper dentures                       Anesthesia Plan  Planned anesthetic: general LMA    ASA 4   Induction: intravenous   Anesthetic plan and risks discussed with: patient  Anesthesia plan special considerations: antiemetics,   Post-op plan: routine recovery

## 2021-06-15 NOTE — TELEPHONE ENCOUNTER
Yes, [facility] printed order, F2F INF, and facesheet.  Faxed successfully to [facility].  If not able to deliver tomorrow, [facility] to call [facility].  Thanks.

## 2021-06-15 NOTE — TELEPHONE ENCOUNTER
"Reason for Call:  Medication or medication refill:    Do you use a [facility]?  Name of the pharmacy and phone number for the current request: [facility] [id number] [place] [address]    Name of the medication requested: \"WATER PILL\" per patient's daughter, notes not finished yet     Other request: Can MD send to preferred pharmacy? Audra states that doc was going to send but nothing at pharmacy.     Can we leave a detailed message on this number? Yes    Phone number patient can be reached at: Cell number on file:    Telephone Information:   Mobile [phone]       Best Time: ASAP    Call taken on 2/18/2021 at 11:44 AM by Gonzales Martinez    "

## 2021-06-15 NOTE — TELEPHONE ENCOUNTER
Reason for Call: Request for an order or referral:    Order or referral being requested: Will MD follow hospice via phone/fax.     Date needed: as soon as possible    Has the patient been seen by the PCP for this problem? YES and Not Applicable    Additional comments: Please call RN with MD response.     Phone number Patient can be reached at:  Other phone number:  [phone]    Best Time:  ASAP     Can we leave a detailed message on this number?  Yes    Call taken on 3/11/2021 at 11:02 AM by Gonzales Martinez

## 2021-06-15 NOTE — ANESTHESIA POSTPROCEDURE EVALUATION
Patient: Sara Ashton  * No procedures listed *  Anesthesia type: general    Patient location: PACU  Last vitals:   Vitals Value Taken Time   BP 78/41 03/05/21 1407   Temp  03/05/21 1414   Pulse 80 03/05/21 1405   Resp 18 03/05/21 1405   SpO2 97 % 03/05/21 1405     Post vital signs: stable  Level of consciousness: awake and responds to simple questions  Post-anesthesia pain: pain controlled  Post-anesthesia nausea and vomiting: no  Pulmonary: unassisted, return to baseline  Cardiovascular: stable and blood pressure at baseline  Hydration: adequate  Anesthetic events: no    QCDR Measures:  ASA# 11 - Jess-op Cardiac Arrest: ASA11B - Patient did NOT experience unanticipated cardiac arrest  ASA# 12 - Jess-op Mortality Rate: ASA12B - Patient did NOT die  ASA# 13 - PACU Re-Intubation Rate: ASA13B - Patient did NOT require a new airway mgmt  ASA# 10 - Composite Anes Safety: ASA10A - No serious adverse event    Additional Notes: internist called , present in PACU.  Patient went into afib with RVR rate to 160 at end of MRI.  Brought to PACU.  ECG confirmed afib.  Give 2mg metoprolol and converted to NSR in the 80's.  ABG drawn, CPAP initiated.  Plan to move pt to ICU status.  Discussed with internist in person in PACU.

## 2021-06-15 NOTE — TELEPHONE ENCOUNTER
I called and talked with the patient's daughter in detail and we addressed her concerns.  The patient had run out of her fluoxetine and I sent refills to the pharmacy along with refills of the albuterol.  She needs to have a hospital follow-up visit with me in the clinic.  Please schedule the patient for a 40-minute hospital follow-up kidney failure visit on 2/18/2021 at 9:00 in clinic with me, patient is aware of appointment.  Thank you.

## 2021-06-16 NOTE — PROGRESS NOTES
ASSESMENT AND PLAN:  Diagnoses and all orders for this visit:    Probable lipoma of left upper extremity  Discussed differential diagnosis with the patient and reviewed with her that the only way to know for sure the definitive diagnosis would be to do excisional biopsy.  We discussed other options including observation and MRI imaging.  At this point, the patient wishes to elect for observation.  She is going to come back in about 6 weeks for recheck, we reviewed indications for sooner follow-up.        SUBJECTIVE: 77-year-old female who noticed a lump in her left arm just distal to the elbow about a month ago.  It has been mildly painful when she puts direct pressure over it.  She has not noticed any out of the ordinary lumps or bumps anywhere else on her body.  He does not think it is been increasing in size since she first noticed it.    No past medical history on file.  Patient Active Problem List   Diagnosis     Esophageal Reflux     Osteoporosis     Obesity     Anxiety     Diverticulosis     Benign Essential Hypertension     Osteopenia     Dermatitis     Hyperlipidemia     Pain During Urination (Dysuria)     Recurrent Major Depression In Partial Remission     Idiopathic Peripheral Neuropathy     Lower Back Pain     Cellulitis     Allergic Rhinitis     Localized Primary Osteoarthritis Of The Knee     Restless Legs Syndrome     Acute sinusitis      GERD (gastroesophageal reflux disease)     Neuropathy     Osteoarthritis, multiple sites     Chest pain on breathing     Current Outpatient Prescriptions   Medication Sig Dispense Refill     acetaminophen (TYLENOL ARTHRITIS) 650 MG CR tablet Take 650 mg by mouth 4 (four) times a day as needed for pain.       albuterol (PROAIR HFA) 90 mcg/actuation inhaler Inhale 1-2 puffs every 4 (four) hours as needed for wheezing or shortness of breath (cough). 1 Inhaler 3     ALLERGY AND CONGESTION RELIEF  mg per 24 hr tablet TAKE ONE TABLET BY MOUTH ONE TIME DAILY 90 tablet  1     ALLERGY RELIEF-D, CETIRIZINE, 5-120 mg per tablet TAKE 1 TABLET BY MOUTH DAILY. 30 tablet 0     amitriptyline (ELAVIL) 25 MG tablet TAKE ONE TABLET BY MOUTH NIGHTLY AT BEDTIME 60 tablet 3     aspirin 81 MG EC tablet Take 81 mg by mouth 3 (three) times a week.       CA CARB & GLUC/MAG OX & GLUC (CALCIUM MAGNESIUM ORAL) Take by mouth. 600-100-300 liquid       cholecalciferol, vitamin D3, (VITAMIN D3) 2,000 unit Tab Take 1 tablet by mouth daily.       ECHINACEA ORAL Take 1 capsule by mouth daily.       FLUoxetine (PROZAC) 20 MG tablet TAKE 1 TABLET (20 MG TOTAL) BY MOUTH DAILY. 90 tablet 2     fluticasone (FLONASE) 50 mcg/actuation nasal spray INSTILL 2 SPRAYS IN EACH NOSTRIL ONE TIME DAILY 16 g 3     FOLIC ACID/MULTIVITS-MIN/LUT (CENTRUM SILVER ORAL) Take 1 tablet by mouth 3 (three) times a day.       ginkgo biloba 40 mg Tab Take 1 tablet by mouth daily.       GLUCOSAMINE SULFATE (GLUCOSAMINE ORAL) Take 2 capsules by mouth daily.       HYDROcodone-acetaminophen 5-325 mg per tablet TAKE ONE TABLET BY MOUTH TWICE DAILY AS NEEDED FOR PAIN 60 tablet 0     LACTOBACILLUS ACIDOPHILUS (PROBIOTIC ORAL) Take by mouth daily.       lisinopril-hydrochlorothiazide (PRINZIDE,ZESTORETIC) 20-12.5 mg per tablet TAKE 1 TABLET BY MOUTH EVERY DAY 90 tablet 2     LYRICA 100 mg capsule TAKE 1 CAPSULE (100 MG TOTAL) BY MOUTH 2 (TWO) TIMES A DAY. 60 capsule 1     meloxicam (MOBIC) 15 MG tablet TAKE 1 TABLET BY MOUTH EVERY DAY 90 tablet 1     meloxicam (MOBIC) 15 MG tablet TAKE 1 TABLET BY MOUTH EVERY DAY 90 tablet 1     methocarbamol (ROBAXIN) 750 MG tablet TAKE 1 TABLET (750 MG TOTAL) BY MOUTH EVERY 8 (EIGHT) HOURS AS NEEDED. 90 tablet 0     olopatadine (PAZEO) 0.7 % Drop Apply 1 drop to eye Daily at 8:00 am.. 2.5 mL 6     omeprazole (PRILOSEC) 20 MG capsule TAKE ONE CAPSULE BY MOUTH ONE TIME DAILY 90 capsule 3     traZODone (DESYREL) 150 MG tablet TAKE ONE TABLET BY MOUTH NIGHTLY AT BEDTIME 90 tablet 3     No current  "facility-administered medications for this visit.      History   Smoking Status     Former Smoker     Packs/day: 0.50     Years: 25.00   Smokeless Tobacco     Never Used     Comment: quit 25 yrs ago       OBJECTICE: /64  Pulse 82  Temp 97.8  F (36.6  C) (Oral)   Resp 12  Ht 5' 1.75\" (1.568 m)  Wt 196 lb (88.9 kg)  SpO2 98%  BMI 36.14 kg/m2     No results found for this or any previous visit (from the past 24 hour(s)).     GEN-alert, appropriate, in no apparent distress   CV-regular rate and rhythm   Musculoskeletal- full range of motion at the elbow.  Just distal to the elbow on the posterior surface of the arm is a 1.5 cm diameter subcutaneous rubbery mass.  Mildly tender to palpation.   SKIN-no overlying ulcers or vesicles or rash      Agustín Craft          "

## 2021-06-16 NOTE — TELEPHONE ENCOUNTER
Per Provider note, successful fax of 9 pages incl cover; last clinic note and hospital discharge summary of  patient.

## 2021-06-17 NOTE — PROGRESS NOTES
ASSESMENT AND PLAN:  Diagnoses and all orders for this visit:    Mass of left forearm  Given the change in character of the palpable mass as detailed below, a is not is soft and rubbery as it was on last exam when I thought it was definitely a lipoma.  Lipoma is still high on the list of differential diagnosis but we discussed option and the patient would like to proceed with MRI imaging to get additional information and we can decide on whether she needs a surgical referral pending the results.  -     MR Elbow With Contrast Left; Future; Expected date: 4/5/18    Lower Back Pain  Patient needs refill on her muscle relaxer.  We have reviewed the risks and benefits of the medication.  -     methocarbamol (ROBAXIN) 750 MG tablet; TAKE 1 TABLET (750 MG TOTAL) BY MOUTH EVERY 8 (EIGHT) HOURS AS NEEDED.  Dispense: 90 tablet; Refill: 0          SUBJECTIVE: 78-year-old female who was seen recently for a subcutaneous mass of the proximal forearm, please see previous notes for details.  She comes in today wanting it rechecked.  It seems a little bit more noticeable to her but she is not sure that it has increased in size.  It is not causing any pain.  She is just worried about what it is.  She continues to have issues with her neuropathy and chronic low back pain.  She uses muscle relaxer as needed for spasms in the low back and gets good relief.  She tolerates the medication well.    No past medical history on file.  Patient Active Problem List   Diagnosis     Esophageal Reflux     Osteoporosis     Obesity     Anxiety     Diverticulosis     Benign Essential Hypertension     Osteopenia     Dermatitis     Hyperlipidemia     Pain During Urination (Dysuria)     Recurrent Major Depression In Partial Remission     Idiopathic Peripheral Neuropathy     Lower Back Pain     Cellulitis     Allergic Rhinitis     Localized Primary Osteoarthritis Of The Knee     Restless Legs Syndrome     Acute sinusitis      GERD (gastroesophageal reflux  disease)     Neuropathy     Osteoarthritis, multiple sites     Chest pain on breathing     Current Outpatient Prescriptions   Medication Sig Dispense Refill     acetaminophen (TYLENOL ARTHRITIS) 650 MG CR tablet Take 650 mg by mouth 4 (four) times a day as needed for pain.       albuterol (PROAIR HFA) 90 mcg/actuation inhaler Inhale 1-2 puffs every 4 (four) hours as needed for wheezing or shortness of breath (cough). 1 Inhaler 3     ALLERGY AND CONGESTION RELIEF  mg per 24 hr tablet TAKE ONE TABLET BY MOUTH ONE TIME DAILY 90 tablet 1     ALLERGY RELIEF-D, CETIRIZINE, 5-120 mg per tablet TAKE 1 TABLET BY MOUTH DAILY. 30 tablet 3     amitriptyline (ELAVIL) 25 MG tablet TAKE ONE TABLET BY MOUTH NIGHTLY AT BEDTIME 60 tablet 3     aspirin 81 MG EC tablet Take 81 mg by mouth 3 (three) times a week.       CA CARB & GLUC/MAG OX & GLUC (CALCIUM MAGNESIUM ORAL) Take by mouth. 600-100-300 liquid       cholecalciferol, vitamin D3, (VITAMIN D3) 2,000 unit Tab Take 1 tablet by mouth daily.       ECHINACEA ORAL Take 1 capsule by mouth daily.       FLUoxetine (PROZAC) 20 MG tablet TAKE 1 TABLET (20 MG TOTAL) BY MOUTH DAILY. 90 tablet 2     fluticasone (FLONASE) 50 mcg/actuation nasal spray INSTILL 2 SPRAYS IN EACH NOSTRIL ONE TIME DAILY 16 g 3     FOLIC ACID/MULTIVITS-MIN/LUT (CENTRUM SILVER ORAL) Take 1 tablet by mouth 3 (three) times a day.       ginkgo biloba 40 mg Tab Take 1 tablet by mouth daily.       GLUCOSAMINE SULFATE (GLUCOSAMINE ORAL) Take 2 capsules by mouth daily.       HYDROcodone-acetaminophen 5-325 mg per tablet TAKE ONE TABLET BY MOUTH TWICE DAILY AS NEEDED FOR PAIN 60 tablet 0     LACTOBACILLUS ACIDOPHILUS (PROBIOTIC ORAL) Take by mouth daily.       lisinopril-hydrochlorothiazide (PRINZIDE,ZESTORETIC) 20-12.5 mg per tablet TAKE 1 TABLET BY MOUTH EVERY DAY 30 tablet 0     LYRICA 100 mg capsule TAKE 1 CAPSULE (100 MG TOTAL) BY MOUTH 2 (TWO) TIMES A DAY. 60 capsule 3     meloxicam (MOBIC) 15 MG tablet TAKE 1  "TABLET BY MOUTH EVERY DAY 90 tablet 1     meloxicam (MOBIC) 15 MG tablet TAKE 1 TABLET BY MOUTH EVERY DAY 90 tablet 1     methocarbamol (ROBAXIN) 750 MG tablet TAKE 1 TABLET (750 MG TOTAL) BY MOUTH EVERY 8 (EIGHT) HOURS AS NEEDED. 90 tablet 0     olopatadine (PAZEO) 0.7 % Drop Apply 1 drop to eye Daily at 8:00 am.. 2.5 mL 6     omeprazole (PRILOSEC) 20 MG capsule TAKE ONE CAPSULE BY MOUTH ONE TIME DAILY 90 capsule 3     traZODone (DESYREL) 150 MG tablet TAKE ONE TABLET BY MOUTH NIGHTLY AT BEDTIME 90 tablet 3     No current facility-administered medications for this visit.      History   Smoking Status     Former Smoker     Packs/day: 0.50     Years: 25.00   Smokeless Tobacco     Never Used     Comment: quit 25 yrs ago       OBJECTICE: /58 (Patient Site: Left Arm, Patient Position: Sitting, Cuff Size: Adult Regular)  Pulse 76  Temp 97.9  F (36.6  C) (Oral)   Resp 20  Ht 5' 1.75\" (1.568 m)  Wt 190 lb 12 oz (86.5 kg)  Breastfeeding? No  BMI 35.17 kg/m2     No results found for this or any previous visit (from the past 24 hour(s)).     GEN-alert, appropriate, in no apparent distress   CV-regular rate and rhythm   Musculoskeletal- good range of motion of the elbow.  There is a subcutaneous mass of the proximal forearm on the posterior elbow.  It is not tender to palpation.  Previously on exam it felt like a lipoma with a soft rubbery consistency.  However, on today's exams it has a harder consistency.   SKIN-no overlying rash or induration or erythema      Agustín Craft          "

## 2021-06-18 NOTE — PATIENT INSTRUCTIONS - HE
"Patient Instructions by Osmar Nuñez DO at 3/18/2020  1:40 PM     Author: Osmar Nuñez DO Service: -- Author Type: Physician    Filed: 3/18/2020  2:21 PM Encounter Date: 3/18/2020 Status: Addendum    : Osmar Nuñez DO (Physician)    Related Notes: Original Note by Osmar Nuñez DO (Physician) filed at 3/18/2020  2:20 PM       We will let you know if the culture results show that a change in treatment is needed. I am treating you mostly due to your symptoms, more then the urine test result today. Call me at clinic in 2 days if you are not doing better.       Patient Education     Kidney Infection (Adult Female)    An infection in one or both kidneys is called \"pyelonephritis.\" It usually happens when bacteria (or rarely, viruses, fungi, or other disease-causing organisms) get into the kidney. The bacteria (or other disease-causing organisms) can enter the kidneys from the bladder or blood traveling from other parts of the body. A kidney infection can become serious. It can cause severe illness, scarring of the kidneys, or kidney failure if not treated properly.  Common causes for this problem include:    Not keeping the genital area clean and dry, which promotes the growth of bacteria    Wiping back to front which drags bacteria from the rectum toward the urinary opening (urethra)    Wearing tight pants or underwear (this lets moisture build up in the genital area, which helps bacteria grow)    Holding urine in for long periods of time    Dehydration  Kidney infections can cause symptoms similar to a bladder infection. Symptoms include:    Pain (or burning) when urinating    Having to urinate more often than usual    Blood in the urine (pink or red)    Abdominal pain or discomfort, usually in the lower abdomen    Pain in the side or back    Pain above the pubic bone    Fever or chills    Vomiting    Loss of appetite  Treatment is oral antibiotics, or in more severe cases, intramuscular or IV " antibiotics. These are started right away and may be changed once urine culture results determine the infecting organisms. Treatment helps prevent a more serious kidney infection.  Medicines  Medicines can help in the treatment of a bladder infection:    Take antibiotics until they are used up, even if you feel better. It is important to finish them to make sure the infection is gone.    Unless another medicine was prescribed, you can use over-the-counter medicines for pain, fever, or discomfort. If you have chronic liver of kidney disease, talk with your healthcare provider before using these medicines. Also talk with your provider if you've ever had a stomach ulcer or gastrointestinal (GI) bleeding, or are taking blood thinners.  Home care  The following are general care guidelines:    Stay home from work or school. Rest in bed until your fever breaks and you are feeling better, or as advised by your healthcare provider.    Drink lots of fluid unless you must restrict fluids for other medical reasons. This will force the medicine into your urinary system and flush the bacteria out of your body. Ask your healthcare provider how much you should drink.    Don't have sex until you have finished all of your medicine and your symptoms are gone.    Don't have caffeine, alcohol, or spicy foods. These foods may irritate the kidneys and bladder.    Don't take bubble baths. Sensitivity to the chemicals in bubble baths can irritate the urethra.    Make sure you wipe from front to back after using the toilet.    Wear loose cloths and cotton underwear.  Prevention  These self-care steps can help prevent future infections:    Drink plenty of fluids to prevent dehydration and flush out the bladder. Do this unless you must restrict fluids for other health reasons, or your healthcare provider told you not to.    Proper cleaning after going to the bathroom in important. Make sure you wipe from front to back after using the  toilet.    Urinate more often. Don't try to hold urine in for a long time.    Don't wear tight-fitting pants and underwear.    Improve your diet to prevent constipation. Eat more fruits, vegetables, and fiber. Eat less junk and fatty foods. Constipation can make a urinary tract infection more likely. Talk with your healthcare provider if you have trouble with bowel movements.    Urinate right after intercourse to flush out the bladder.  Follow-up care  Follow up with your healthcare provider, or as advised. Additional testing may be needed to make sure the infection has cleared. Close follow-up and further testing is very important to find the cause and to prevent future infections.  If a urine culture was done, you will be contacted if your treatment needs to be changed. If directed, you may call to find out the results.  If you had an X-ray, CT scan, or other diagnostic test, you will be notified of any new findings that may affect your care.  Call 911  Call 911 if any of the following occur:    Trouble breathing    Fainting or loss of consciousness    Rapid or very slow heart rate    Weakness, dizziness, or fainting    Difficulty arousing or confusion  When to seek medical advice  Call your healthcare provider right away if any of these occur:    Fever 100.4 F (38 C) or higher, or as directed by your healthcare provider    Not feeling better within 1 to 2 days after starting antibiotics    Any symptom that continues after 3 days of treatment    Increasing pain in the stomach, back, side, or groin area    Repeated vomiting    Not able to take prescribed medicine due to nausea or another reason    Bloody, dark-colored, or foul smelling urine    Trouble urinating or decreased urine output    No urine for 8 hours, no tears when crying, sunken eyes, or dry mouth  Date Last Reviewed: 10/1/2016    [employer]01 [employer]. [address] [place] [address], [place] [address]. All rights reserved. This information is not  intended as a substitute for professional medical care. Always follow your healthcare professional's instructions.

## 2021-06-18 NOTE — PROGRESS NOTES
"LEEANN Ashton is a 78 y.o. female here for shortness of breath and chest pain on breathing.  Her shortness of breath started 4 days ago while she was at her granddaughter's softball tournament.  She was outside and typically does have seasonal allergies, but breathing was more difficult than normal. She takes cetirizine and uses Flonase daily. Then, yesterday, she developed pain in the middle of her chest.  It got a little better in the evening and she slept through the night, that has been getting progressively worse throughout the morning today. The chest pain does not get worse on exertion, like climbing stairs, but her shortness of breath gets worse. She has had no fevers.    She has an albuterol inhaler which she was given about a year ago for an episode of bronchitis.  She has been using it for the past few days but it does not make her breathing better. She feels \"congested, like I can't get anything out.\" She has had no coughing or wheezing. She quit smoking 25 years ago.     Takes cetirizine. Not doing anything. \"Has lots of allergies.\"     Past Medical History:   Diagnosis Date     Anxiety      Depression      Diverticulitis      GERD (gastroesophageal reflux disease)      Hyperlipidemia      Hypertension      Osteoarthritis      Current Outpatient Prescriptions on File Prior to Visit   Medication Sig Dispense Refill     acetaminophen (TYLENOL ARTHRITIS) 650 MG CR tablet Take 650 mg by mouth 4 (four) times a day as needed for pain.       ALLERGY AND CONGESTION RELIEF  mg per 24 hr tablet TAKE ONE TABLET BY MOUTH ONE TIME DAILY 90 tablet 1     amitriptyline (ELAVIL) 25 MG tablet TAKE ONE TABLET BY MOUTH NIGHTLY AT BEDTIME 60 tablet 3     aspirin 81 MG EC tablet Take 81 mg by mouth 3 (three) times a week.       CA CARB & GLUC/MAG OX & GLUC (CALCIUM MAGNESIUM ORAL) Take by mouth. 600-100-300 liquid       cetirizine-pseudoephedrine (ALLERGY RELIEF-D, CETIRIZINE,) 5-120 mg per tablet Take 1 tablet by " mouth daily. 90 tablet 3     cholecalciferol, vitamin D3, (VITAMIN D3) 2,000 unit Tab Take 1 tablet by mouth daily.       ECHINACEA ORAL Take 1 capsule by mouth daily.       FLUoxetine (PROZAC) 20 MG tablet TAKE 1 TABLET (20 MG TOTAL) BY MOUTH DAILY. 90 tablet 2     fluticasone (FLONASE) 50 mcg/actuation nasal spray INSTILL 2 SPRAYS IN EACH NOSTRIL ONE TIME DAILY 16 g 6     FOLIC ACID/MULTIVITS-MIN/LUT (CENTRUM SILVER ORAL) Take 1 tablet by mouth 3 (three) times a day.       ginkgo biloba 40 mg Tab Take 1 tablet by mouth daily.       GLUCOSAMINE SULFATE (GLUCOSAMINE ORAL) Take 2 capsules by mouth daily.       HYDROcodone-acetaminophen 5-325 mg per tablet TAKE ONE TABLET BY MOUTH TWICE DAILY AS NEEDED FOR PAIN 60 tablet 0     LACTOBACILLUS ACIDOPHILUS (PROBIOTIC ORAL) Take by mouth daily.       lisinopril-hydrochlorothiazide (PRINZIDE,ZESTORETIC) 20-12.5 mg per tablet Take 1 tablet by mouth daily. 90 tablet 2     LYRICA 100 mg capsule TAKE 1 CAPSULE (100 MG TOTAL) BY MOUTH 2 (TWO) TIMES A DAY. 60 capsule 3     meloxicam (MOBIC) 15 MG tablet TAKE 1 TABLET BY MOUTH EVERY DAY 90 tablet 1     meloxicam (MOBIC) 15 MG tablet TAKE 1 TABLET BY MOUTH EVERY DAY 90 tablet 1     methocarbamol (ROBAXIN) 750 MG tablet TAKE 1 TABLET (750 MG) BY MOUTH EVERY EIGHT HOURS AS NEEDED. 90 tablet 0     olopatadine (PATANOL) 0.1 % ophthalmic solution Administer 1 drop to both eyes 2 (two) times a day. 5 mL 6     omeprazole (PRILOSEC) 20 MG capsule TAKE ONE CAPSULE BY MOUTH ONE TIME DAILY 90 capsule 3     traZODone (DESYREL) 150 MG tablet TAKE ONE TABLET BY MOUTH NIGHTLY AT BEDTIME 90 tablet 3     VENTOLIN HFA 90 mcg/actuation inhaler INHALE 1-2 PUFFS EVERY 4 (FOUR) HOURS AS NEEDED FOR WHEEZING OR SHORTNESS OF BREATH (COUGH). 1 Inhaler 3     No current facility-administered medications on file prior to visit.        Past medical and social history reviewed with no changes.   ?  ROS:   Gen: no lightheadedness or dizziness  GI: No  "nausea  ?  O  /54  Pulse 75  Temp 97.6  F (36.4  C) (Oral)   Resp 24  Ht 5' 1.75\" (1.568 m)  Wt 190 lb (86.2 kg) Comment: with shoes  SpO2 95% Comment: RA  Breastfeeding? No  BMI 35.03 kg/m2   Vitals reviewed. Nursing note reviewed.  General Appearance: Pleasant and alert, in no acute distress  HEENT: TMs clear, oropharynx without edema or exudate, mucous membranes moist, neck supple, no lymphadenopathy  CV: RRR, 2/6 systolic murmur, loudest over RUSB.   Resp: No respiratory distress. Faint crackles heard over right lower lobe. No wheezes  Abd: Soft, nontender, nondistended, bowel sounds present. No masses.  Ext: No peripheral edema, good distal perfusion  Skin: warm, dry, intact, no rash noted  Neuro: no focal deficits, CNs II-XII normal.   A/P  Sara was seen today for shortness of breath.    Diagnoses and all orders for this visit:    Chest pain on breathing/Shortness of breath: These symptoms are concerning because they have been worsening over the past couple of days and not improving with albuterol and antiallergy medications.  She has faint crackles over her right lower lobe.  She has a systolic murmur today, heard loudest over the right second intercostal space, which may represent early aortic stenosis.  I see no mention of a murmur in her chart and she does not believe anyone has told her she has one before, so it is possible this is a new murmur.  Her O2 sats are also slightly lower than normal, at 95%.  At previous visits, they have been 98%.  I feel it is safest for her to be evaluated in the ER to rule out acute coronary syndrome.  Her vitals are stable and I feel it is safe for her to go over to [facility] ER by driving herself.  I will call one of their providers to give a handoff now.  She agrees with the plan.    Options for treatment and follow-up care were reviewed with the patient and/or guardian. Sara Ashton and/or guardian engaged in the decision making process and verbalized " understanding of the options discussed and agreed with the final plan.    Berta Hudson MD

## 2021-06-19 NOTE — LETTER
Letter by Nida Morin MD at      Author: Nida Morin MD Service: -- Author Type: --    Filed:  Encounter Date: 5/15/2019 Status: (Other)         Sara Ashton  [address]  [facility] [address]    May 15, 2019    Dear Ms. Ashton,    Welcome to [facility]! Your appointment information is below.   Please bring the following to your appointment:    Insurance Card, so we may scan it for our records    Drivers license or valid ID, so we may scan it for our records    Co-pay (as applicable per your insurance plan)    A current list of your medications including over the counter products such as vitamins and supplements    Your medical records including copies of X-Ray films if you are transferring your care from another clinic.  If you do not have your records, please fill out the release of information form and we will request those records.     Provider: Nida Morin MD  Appointment Date:  Tuesday, June 25, 2019  Arrival Time:  8:30 am    Location: [address]        [facility], [address]    **Please allow adequate time for your commute and parking. If you are more than 10 minutes late, you may be asked to reschedule.     If you need to cancel or reschedule your appointment, please notify us at least 24 hours prior to your appointment time so we are able to make this time available for another patient.    Thank you for choosing the [facility] for your health care needs. If you have any questions, please do not hesitate to contact us at any time at   [phone]. We look forward to caring for you.     Sincerely,     [facility] staff

## 2021-06-19 NOTE — LETTER
Letter by Jade Narvaez, Deja at      Author: Jade Narvaez, Deja Service: -- Author Type: --    Filed:  Encounter Date: 2019 Status: (Other)             2019      Sara Ashton  [address]  [facility] [address]            Dear Sara Ashton     Thank you for talking with me on 19 about your health and medications. [facility] MTM (Medication Therapy Management) program helps you understand your medications and use them safely.    Along with this letter are an action plan (Medication Action Plan) and a medication list (Personal Medication List). The action plan has steps you should take to help you get the best results from your medications. The medication list will help you keep track of your medications and how to use them the right way.       Have your action plan and medication list with you when you talk with your doctors, pharmacists, and other health care providers.    Ask your doctors, pharmacists, and other healthcare providers to update them at every visit.     Take your medication list with you if you go to the hospital or emergency room.     Give a copy of the action plan and medication list to your family or caregivers.     If you want to talk about this letter or any of the papers with it, please call [phone]. We look forward to working with you, your doctors, and other healthcare providers to help you stay healthy.    Sincerely,     Jade Narvaez    _  Medication Action Plan For Sara Ashton, : 1940     This action plan will help you get the best results from your medications if you:     1. Read What we talked about.   2. Take the steps listed in the What I need to do boxes.   3. Fill in What I did and when I did it.   4. Fill in My follow-up plan and Questions I want to ask.     Have this action plan with you when you talk with your doctors, pharmacists, and other healthcare providers in your care team. Share this with your family or caregivers too.    Date  Prepared: 5/24/2019      What we talked about:  You have not had a bone density scan (DEXA scan) since 2012.      What I need to do:  Next time you see your doctor, talk about getting an updated bone density scan and Vitamin D level.  What I did and when I did it:          What we talked about:       What I need to do:       What I did and when I did it:          What we talked about:       What I need to do:       What I did and when I did it:         My follow-up plan (add notes about next steps):            Questions I want to ask (include topics about medications or therapy):          If you have any questions about your action plan, call Jade BRADSHAW Chonabdulkadir at [phone], Monday-Friday 8:00-4:30pm  _  This medication list was made for you after we talked. We also used information from your doctors chart.      Use blank rows to add new medications. Then fill in the dates you started using them.     Cross out medications when you no longer use them. Then write the date and why you stopped using them.     Ask your doctors, pharmacists, and other healthcare providers to update this list at every visit.  Keep this list up-to-date with:  ? prescription medications  ? over the counter drugs  ? herbals  ? vitamins  ? minerals          If you go to the hospital or emergency room, take this list with you. Share this with your family or caregivers too.    Date Prepared: 5/24/2019      Allergies or side effects:       Medication: acetaminophen (TYLENOL ARTHRITIS) 650 MG CR tablet      How I use it: Take 650 mg by mouth 4 (four) times a day as needed for pain.      Why I use it: Pain    Prescriber: Agustín Craft MD      Date I started using it:     Date I stopped using it:       Why I stopped using it:          Medication: albuterol (PROVENTIL) 2.5 mg /3 mL (0.083 %) nebulizer solution      How I use it: Take 3 mL (2.5 mg total) by nebulization every 6 (six) hours as needed for wheezing.      Why I use it: Wheezing     Prescriber: Toy Malone MD      Date I started using it:     Date I stopped using it:       Why I stopped using it:          Medication: aspirin 81 MG EC tablet      How I use it: Take 81 mg by mouth daily.             Why I use it: Stroke Prevention    Prescriber: Agustín Craft MD      Date I started using it:     Date I stopped using it:       Why I stopped using it:          Medication: calcium, as carbonate, (OS-JACKIE) 500 mg calcium (1,250 mg) tablet      How I use it: Take 1 tablet by mouth daily.      Why I use it: Osteopenia    Prescriber: Agustín Craft MD      Date I started using it:     Date I stopped using it:       Why I stopped using it:          Medication: cholecalciferol, vitamin D3, (VITAMIN D3) 2,000 unit Tab      How I use it: Take 1 tablet by mouth daily.      Why I use it: Osteopenia    Prescriber: Timmy Isaacs MD      Date I started using it:     Date I stopped using it:       Why I stopped using it:          Medication: fexofenadine (ALLEGRA) 180 MG tablet      How I use it: Take 180 mg by mouth daily.      Why I use it: Allergies    Prescriber: Agustín Craft MD      Date I started using it:     Date I stopped using it:       Why I stopped using it:          Medication: FLUoxetine (PROZAC) 20 MG capsule      How I use it: Take 3 capsules (60 mg total) by mouth daily.      Why I use it: Major depressive disorder in partial remission, unspecified whether recurrent     Prescriber: Agustín Craft MD      Date I started using it:     Date I stopped using it:       Why I stopped using it:          Medication: fluticasone propionate (FLONASE) 50 mcg/actuation nasal spray      How I use it: Apply 1 spray into each nostril 2 (two) times a day.      Why I use it: Allergies    Prescriber: Agustín Craft MD      Date I started using it:     Date I stopped using it:       Why I stopped using it:          Medication: furosemide (LASIX) 20 MG tablet      How I use it: Take 2 tablets (40 mg  total) by mouth daily.      Why I use it: Acute congestive heart failure, unspecified heart failure type    Prescriber: Ori Hill PA-C      Date I started using it:     Date I stopped using it:       Why I stopped using it:          Medication: GLUCOSAMINE SULFATE (GLUCOSAMINE ORAL)      How I use it: Take 1 capsule by mouth daily.             Why I use it: Arthritis    Prescriber: Agustín Craft MD      Date I started using it:     Date I stopped using it:       Why I stopped using it:          Medication: HYDROcodone-acetaminophen 5-325 mg per tablet      How I use it: TAKE ONE TABLET BY MOUTH TWICE DAILY AS NEEDED FOR PAIN      Why I use it: Neuropathy     Prescriber: Ori Hill PA-C      Date I started using it:     Date I stopped using it:       Why I stopped using it:          Medication: hypromellose (ARTIFICIAL TEARS,[id number],) Drop      How I use it: Administer 1 drop to both eyes 4 (four) times a day as needed (dry eyes).      Why I use it: Dry eyes    Prescriber: Agustín rCaft MD      Date I started using it:     Date I stopped using it:       Why I stopped using it:          Medication: LACTOBACILLUS ACIDOPHILUS (PROBIOTIC ORAL)      How I use it: Take by mouth daily.      Why I use it: Gut Health    Prescriber: Agustín Craft MD      Date I started using it:     Date I stopped using it:       Why I stopped using it:          Medication: levalbuterol (XOPENEX) 1.25 mg/3 mL nebulizer solution      How I use it: Take 3 mL every 4 hours as needed for Reactive Airway disease.      Why I use it: Bronchospasm    Prescriber: Ori Hill PA-C      Date I started using it:     Date I stopped using it:       Why I stopped using it:          Medication: lisinopril-hydrochlorothiazide (PRINZIDE,ZESTORETIC) 20-12.5 mg per tablet      How I use it: TAKE 1 TABLET BY MOUTH EVERY DAY      Why I use it: HTN (Hypertension)    Prescriber: Agustín Craft MD      Date I started using it:     Date I stopped using it:        Why I stopped using it:          Medication: meloxicam (MOBIC) 15 MG tablet      How I use it: Take 1 tablet (15 mg total) by mouth daily.      Why I use it: Pain    Prescriber: Agustín Craft MD      Date I started using it:     Date I stopped using it:       Why I stopped using it:          Medication: nebulizer and compressor (VIOS AEROSOL DELIVERY SYSTEM) Kristina      How I use it: 1 nebulization every 6 hours as needed for wheezing.      Why I use it: Wheezing    Prescriber: Toy Malone MD      Date I started using it:     Date I stopped using it:       Why I stopped using it:          Medication: omeprazole (PRILOSEC) 20 MG capsule      How I use it: TAKE ONE CAPSULE BY MOUTH ONE TIME DAILY      Why I use it: GERD (Gastroesophageal Reflux Disease)    Prescriber: Agustín Craft MD      Date I started using it:     Date I stopped using it:       Why I stopped using it:          Medication: oxybutynin (DITROPAN XL) 5 MG ER tablet      How I use it: Take 1 tablet (5 mg total) by mouth daily.      Why I use it: Overactive Bladder; Female Stress Incontinence    Prescriber: Agustín Craft MD      Date I started using it:     Date I stopped using it:       Why I stopped using it:          Medication: predniSONE (DELTASONE) 10 mg tablet      How I use it: Take 10 mg by mouth daily with breakfast.      Why I use it: Chronic obstructive pulmonary disease with acute exacerbation (H)    Prescriber: Jermaine Alberto MD      Date I started using it:     Date I stopped using it:       Why I stopped using it:          Medication: predniSONE (DELTASONE) 5 MG tablet      How I use it: Take 5 mg by mouth daily with breakfast.      Why I use it: Chronic obstructive pulmonary disease with acute exacerbation (H)    Prescriber: Jermaine Alberto MD      Date I started using it:     Date I stopped using it:       Why I stopped using it:          Medication: pregabalin (LYRICA) 75 MG capsule      How I use it: Take 1 capsule (75 mg  total) by mouth 2 (two) times a day.      Why I use it: Osteoarthritis of multiple joints, unspecified osteoarthritis type; Neuropathy (H)    Prescriber: Agustín Craft MD      Date I started using it:     Date I stopped using it:       Why I stopped using it:          Medication: traZODone (DESYREL) 150 MG tablet      How I use it: Take 1 tablet (150 mg total) by mouth at bedtime.      Why I use it: Insomnia    Prescriber: Agustín Craft MD      Date I started using it:     Date I stopped using it:       Why I stopped using it:          Medication: VENTOLIN HFA 90 mcg/actuation inhaler      How I use it: INHALE 1-2 PUFFS EVERY FOUR HOURS AS NEEDED FOR WHEEZING, SHORTNESS OF BREATH, OR COUGH.      Why I use it: Dyspnea on Exertion    Prescriber: Agustín Craft MD      Date I started using it:     Date I stopped using it:       Why I stopped using it:          Medication:       How I use it:       Why I use it:     Prescriber:       Date I started using it:     Date I stopped using it:       Why I stopped using it:          Medication:       How I use it:       Why I use it:     Prescriber:       Date I started using it:     Date I stopped using it:       Why I stopped using it:          Medication:       How I use it:       Why I use it:     Prescriber:       Date I started using it:     Date I stopped using it:       Why I stopped using it:          Other Information:      If you have any questions about your medication list, call [phone]    According to the Paperwork Reduction Act of 1995, no persons are required to respond to a collection of information unless it displays a valid OMB control number. The valid B number for this information collection is 0634-0204. The time required to complete this information collection is estimated to average 37.76 minutes per response, including the time to review instructions, searching existing data resources, gather the data needed, and complete and review the  information collection. If you have any comments concerning the accuracy of the time estimate(s) or suggestions for improving this form, please write to: CMS, Attn: [employer] Reports Clearance Officer, [address] [place] [address], [place], [place] [address].

## 2021-06-20 NOTE — PROGRESS NOTES
Discussed the CT scan results with the patient over the telephone, we are going to treat with trimethoprim sulfa and metronidazole as ordered today, she will  the medicine immediately this evening at the pharmacy and get started on the medication, we again reviewed indications for routine and emergent follow-up.

## 2021-06-20 NOTE — PROGRESS NOTES
"ASSESMENT AND PLAN:  Diagnoses and all orders for this visit:    1. Shoulder pain, right  - Sharp shoulder pain x 6 weeks; getting better. Pain 4/10.   Atraumatic.  Most consistent with Chronic overuse tendonopathy.  No viral etiology No indication for imaging at this time.   -  Follow up if symptoms not better or worsens.   -  Pt has Hydrocodone and is taking for pain.  -  Pt may apply heat or cold to area and rest shoulder.    -  Follow up if symptoms not better or worsens.     Reviewed Medical/Social history and Medications.  No new changes.  Discussed indications for emergent medical attention and routine F/u.  Patient/Parent/Guardian engaged in decision making process and verbalized understanding of the options discussed and agreed with the final treatment plan.     SUBJECTIVE:  Sara Ashton is a 78 y.o. Male who presents with Right shoulder pain x 6 weeks.     Pt has been cleaning and scrubbing home and lifting heavy items, \"I felt a sharp pull in my shoulder  but it's gotten better since yesterday.\"  Pain is 4/10.  Pt denies edema or erythema. On exam there is tenderness with elevation and depression against force.   Denies fever/chills, wheezing, SOB, CP, n/v.      ROS:  Comprehensive Review of Systems Negative except stated in HPI.     Past Medical History:   Diagnosis Date     Anxiety      Depression      Diverticulitis      GERD (gastroesophageal reflux disease)      Hyperlipidemia      Hypertension      Osteoarthritis      Patient Active Problem List   Diagnosis     Esophageal Reflux     Osteoporosis     Obesity     Anxiety     Diverticulosis     Benign Essential Hypertension     Osteopenia     Dermatitis     Hyperlipidemia     Pain During Urination (Dysuria)     Recurrent Major Depression In Partial Remission     Idiopathic Peripheral Neuropathy     Lower Back Pain     Cellulitis     Allergic Rhinitis     Localized Primary Osteoarthritis Of The Knee     Restless Legs Syndrome     Acute sinusitis      " GERD (gastroesophageal reflux disease)     Neuropathy (H)     Osteoarthritis, multiple sites     Chest pain on breathing     Bronchitis with bronchospasm     Current Outpatient Prescriptions   Medication Sig Dispense Refill     acetaminophen (TYLENOL ARTHRITIS) 650 MG CR tablet Take 650 mg by mouth 4 (four) times a day as needed for pain.       albuterol (PROVENTIL) 2.5 mg /3 mL (0.083 %) nebulizer solution Take 3 mL (2.5 mg total) by nebulization every 6 (six) hours as needed for wheezing. 75 vial 3     ALLERGY AND CONGESTION RELIEF  mg per 24 hr tablet TAKE ONE TABLET BY MOUTH ONE TIME DAILY 90 tablet 1     amitriptyline (ELAVIL) 25 MG tablet TAKE ONE TABLET BY MOUTH NIGHTLY AT BEDTIME 60 tablet 3     aspirin 81 MG EC tablet Take 81 mg by mouth 3 (three) times a week.       CA CARB & GLUC/MAG OX & GLUC (CALCIUM MAGNESIUM ORAL) Take by mouth. 600-100-300 liquid       cetirizine-pseudoephedrine (ALLERGY RELIEF-D, CETIRIZINE,) 5-120 mg per tablet Take 1 tablet by mouth daily. 90 tablet 3     cholecalciferol, vitamin D3, (VITAMIN D3) 2,000 unit Tab Take 1 tablet by mouth daily.       ECHINACEA ORAL Take 1 capsule by mouth daily.       FLUoxetine (PROZAC) 20 MG tablet TAKE 1 TABLET (20 MG TOTAL) BY MOUTH DAILY. 90 tablet 2     fluticasone (FLONASE) 50 mcg/actuation nasal spray INSTILL 2 SPRAYS IN EACH NOSTRIL ONE TIME DAILY 16 g 6     FOLIC ACID/MULTIVITS-MIN/LUT (CENTRUM SILVER ORAL) Take 1 tablet by mouth 3 (three) times a day.       ginkgo biloba 40 mg Tab Take 1 tablet by mouth daily.       GLUCOSAMINE SULFATE (GLUCOSAMINE ORAL) Take 2 capsules by mouth daily.       HYDROcodone-acetaminophen 5-325 mg per tablet TAKE ONE TABLET BY MOUTH TWICE DAILY AS NEEDED FOR PAIN 60 tablet 0     LACTOBACILLUS ACIDOPHILUS (PROBIOTIC ORAL) Take by mouth daily.       lisinopril-hydrochlorothiazide (PRINZIDE,ZESTORETIC) 20-12.5 mg per tablet Take 1 tablet by mouth daily. 90 tablet 2     LYRICA 100 mg capsule TAKE 1 CAPSULE  "(100 MG TOTAL) BY MOUTH 2 (TWO) TIMES A DAY. 60 capsule 1     meloxicam (MOBIC) 15 MG tablet TAKE 1 TABLET BY MOUTH EVERY DAY 90 tablet 1     methocarbamol (ROBAXIN) 750 MG tablet TAKE 1 TABLET (750 MG) BY MOUTH EVERY EIGHT HOURS AS NEEDED. 90 tablet 0     nebulizer and compressor (VIOS AEROSOL DELIVERY SYSTEM) Kristina 1 nebulization every 6 hours as needed for wheezing. 1 each 0     olopatadine (PATANOL) 0.1 % ophthalmic solution Administer 1 drop to both eyes 2 (two) times a day. 5 mL 6     omeprazole (PRILOSEC) 20 MG capsule TAKE ONE CAPSULE BY MOUTH ONE TIME DAILY 90 capsule 3     predniSONE (DELTASONE) 10 mg tablet Take 2 tablets (20 mg total) by mouth daily. 15 tablet 0     traZODone (DESYREL) 150 MG tablet Take 1 tablet (150 mg total) by mouth at bedtime. 90 tablet 3     VENTOLIN HFA 90 mcg/actuation inhaler INHALE 1-2 PUFFS EVERY 4 (FOUR) HOURS AS NEEDED FOR WHEEZING OR SHORTNESS OF BREATH (COUGH). 18 Inhaler 2     No current facility-administered medications for this visit.      History   Smoking Status     Former Smoker     Packs/day: 0.50     Years: 25.00   Smokeless Tobacco     Never Used     Comment: quit 25 yrs ago     OBJECTIVE: /54  Pulse 76  Temp 97.9  F (36.6  C) (Oral)   Resp 24  Ht 5' 1\" (1.549 m)  Wt 193 lb 12 oz (87.9 kg)  SpO2 95%  BMI 36.61 kg/m2   No results found for this or any previous visit (from the past 24 hour(s)).    PHYSICAL:  General Alert, awake, not in acute distress.   CV Normal S1 & S2. No murmurs.   RESP Non-labored, RRR, CTAB. No wheezes or crackles.    EXTREMITY No edema, erythema, deformity. FROM with mild tenderness. Tenderness with elevation and depression of right arm against force.    Pulses present. Normal capillary refill.     NEURO Grossly intact, no focal deficit. Sensation and Strength intact. Oriented x 3.  Gait normal.        Ori Hill PA-C           "

## 2021-06-20 NOTE — LETTER
Letter by Agustín Craft MD at      Author: Agustín Craft MD Service: -- Author Type: --    Filed:  Encounter Date: 1/14/2020 Status: Signed               [address]  [place] [address]   PHONE [phone]  [phone]     January 14, 2020  Sara Ashton  [address] [facility] [address]    Dear Sara:    Below are the results from your recent visit.  Your results are normal.    Resulted Orders   Glycosylated Hemoglobin A1c   Result Value Ref Range    Hemoglobin A1c 5.6 3.5 - 6.0 %   Thyroid Cascade   Result Value Ref Range    TSH 1.63 0.30 - 5.00 uIU/mL   Vitamin D, Total (25-Hydroxy)   Result Value Ref Range    Vitamin D, Total (25-Hydroxy) 57.3 30.0 - 80.0 ng/mL    Narrative    Deficiency <10.0 ng/mL  Insufficiency 10.0-29.9 ng/mL  Sufficiency 30.0-80.0 ng/mL  Toxicity (possible) >100.0 ng/mL         If you have any questions or concerns, please do not hesitate to call.    Sincerely,      Agustín Craft MD  January 14, 2020

## 2021-06-20 NOTE — PROGRESS NOTES
ASSESMENT AND PLAN:  Diagnoses and all orders for this visit:    Major depression  Counseling done today.  Good response to the increased dose of fluoxetine which she has been modifying as detailed below.  We reviewed indications for follow-up.    Diverticulitis of colon  Clinically she has improved 100% with full antibiotic completion of her course.  We discussed whether she should have a follow-up colonoscopy and I did recommend one.  However, the patient refuses this.  We reviewed the CT scan results again, there were no concerning findings concerning for any possible mass but I counseled the patient that the usual full evaluation would be a follow-up colonoscopy but she declines as detailed above.    Seasonal allergic rhinitis due to pollen  With occasional bronchospasm.  She can use the albuterol as needed and Allegra daily.        SUBJECTIVE: 78-year-old female comes in today for follow-up on her last visit at which she was diagnosed with diverticulitis.  She was also had a change in her fluoxetine made to a higher dose because of some worsening depression.  Please see that note for details.  Patient completed all of her antibiotics.  She has had 100% resolution of her abdominal pain.  No fevers, no vomiting, overall she is feeling back to her normal self.  She continues to have a lot of stress related to her 's progressive dementia and increased stress on her trying to take care of him.  He has slowly become more abstinent and irritable and patient has taken the approach of stopping trying to argue with him about things.  This has helped some but she still feels quite a bit of stress related to being his main caregiver.  She does have help from her daughter.  She feels like overall she is still managing okay at home and that she wants to be doing long-term planning to move into possible assisted living/memory care environment but for right now she is happy with things at home and does not need  additional help at home.  Last visit we had increased her fluoxetine and she has noticed some improvement in her mood.  She reports that she has not been taking 2 tablets of fluoxetine every day because it makes her too tired, instead she is been taking 1 tablet alternating every other day with 2 tablets and this seems to work out better for her.  Patient reports that this time of year in the fall she usually gets nasal congestion and runny nose and some coughing.  In the past the coughing is responded well to albuterol and the runny nose has responded well to Allegra.  No chest pain or shortness of breath.  No fevers.    Past Medical History:   Diagnosis Date     Anxiety      Depression      Diverticulitis      GERD (gastroesophageal reflux disease)      Hyperlipidemia      Hypertension      Osteoarthritis      Patient Active Problem List   Diagnosis     Esophageal Reflux     Osteoporosis     Obesity     Anxiety     Diverticulosis     Benign Essential Hypertension     Osteopenia     Dermatitis     Hyperlipidemia     Pain During Urination (Dysuria)     Major depression     Idiopathic Peripheral Neuropathy     Lower Back Pain     Cellulitis     Allergic Rhinitis     Localized Primary Osteoarthritis Of The Knee     Restless Legs Syndrome     Acute sinusitis      GERD (gastroesophageal reflux disease)     Neuropathy (H)     Osteoarthritis, multiple sites     Chest pain on breathing     Bronchitis with bronchospasm     Current Outpatient Prescriptions   Medication Sig Dispense Refill     acetaminophen (TYLENOL ARTHRITIS) 650 MG CR tablet Take 650 mg by mouth 4 (four) times a day as needed for pain.       albuterol (PROVENTIL) 2.5 mg /3 mL (0.083 %) nebulizer solution Take 3 mL (2.5 mg total) by nebulization every 6 (six) hours as needed for wheezing. 75 vial 3     ALLERGY AND CONGESTION RELIEF  mg per 24 hr tablet TAKE ONE TABLET BY MOUTH ONE TIME DAILY 90 tablet 1     amitriptyline (ELAVIL) 25 MG tablet TAKE ONE  TABLET BY MOUTH NIGHTLY AT BEDTIME 60 tablet 3     aspirin 81 MG EC tablet Take 81 mg by mouth 3 (three) times a week.       CA CARB & GLUC/MAG OX & GLUC (CALCIUM MAGNESIUM ORAL) Take by mouth. 600-100-300 liquid       cetirizine-pseudoephedrine (ALLERGY RELIEF-D, CETIRIZINE,) 5-120 mg per tablet Take 1 tablet by mouth daily. 90 tablet 3     cholecalciferol, vitamin D3, (VITAMIN D3) 2,000 unit Tab Take 1 tablet by mouth daily.       ECHINACEA ORAL Take 1 capsule by mouth daily.       FLUoxetine (PROZAC) 20 MG tablet Take 2 tablets (40 mg total) by mouth daily. 180 tablet 3     fluticasone (FLONASE) 50 mcg/actuation nasal spray INSTILL 2 SPRAYS IN EACH NOSTRIL ONE TIME DAILY 16 g 6     FOLIC ACID/MULTIVITS-MIN/LUT (CENTRUM SILVER ORAL) Take 1 tablet by mouth 3 (three) times a day.       ginkgo biloba 40 mg Tab Take 1 tablet by mouth daily.       GLUCOSAMINE SULFATE (GLUCOSAMINE ORAL) Take 2 capsules by mouth daily.       HYDROcodone-acetaminophen 5-325 mg per tablet TAKE ONE TABLET BY MOUTH TWICE DAILY AS NEEDED FOR PAIN 60 tablet 0     LACTOBACILLUS ACIDOPHILUS (PROBIOTIC ORAL) Take by mouth daily.       lisinopril-hydrochlorothiazide (PRINZIDE,ZESTORETIC) 20-12.5 mg per tablet Take 1 tablet by mouth daily. 90 tablet 2     LYRICA 100 mg capsule TAKE 1 CAPSULE BY MOUTH TWICE A DAY 60 capsule 1     meloxicam (MOBIC) 15 MG tablet TAKE 1 TABLET BY MOUTH EVERY DAY 90 tablet 1     methocarbamol (ROBAXIN) 750 MG tablet TAKE 1 TABLET (750 MG) BY MOUTH EVERY EIGHT HOURS AS NEEDED. 90 tablet 0     nebulizer and compressor (VIOS AEROSOL DELIVERY SYSTEM) Kristina 1 nebulization every 6 hours as needed for wheezing. 1 each 0     olopatadine (PATANOL) 0.1 % ophthalmic solution Administer 1 drop to both eyes 2 (two) times a day. 5 mL 6     omeprazole (PRILOSEC) 20 MG capsule TAKE ONE CAPSULE BY MOUTH ONE TIME DAILY 90 capsule 3     predniSONE (DELTASONE) 10 mg tablet Take 2 tablets (20 mg total) by mouth daily. 15 tablet 0      "traZODone (DESYREL) 150 MG tablet Take 1 tablet (150 mg total) by mouth at bedtime. 90 tablet 3     VENTOLIN HFA 90 mcg/actuation inhaler INHALE 1-2 PUFFS EVERY 4 (FOUR) HOURS AS NEEDED FOR WHEEZING OR SHORTNESS OF BREATH (COUGH). 18 Inhaler 2     No current facility-administered medications for this visit.      History   Smoking Status     Former Smoker     Packs/day: 0.50     Years: 25.00   Smokeless Tobacco     Never Used     Comment: quit 25 yrs ago       OBJECTICE: /62  Pulse 70  Temp 97.9  F (36.6  C) (Oral)   Resp 16  Ht 5' 1\" (1.549 m)  Wt 192 lb (87.1 kg)  SpO2 92%  BMI 36.28 kg/m2     No results found for this or any previous visit (from the past 24 hour(s)).     GEN-alert, appropriate, in no apparent distress   HEENT-oropharynx is clear, neck is supple   CV-regular rate and rhythm with no murmur   RESP-lungs clear to auscultation   ABDOMINAL-soft, nontender, no palpable mass   Psychiatric-appearance is well-groomed, speech of normal fluency and rate, mood is depressed but improving and affect is brighter today.  Thought content negative for suicidal homicidal ideation.  Thought processing negative for paranoid or delusional thinking.        Agustín Craft          "

## 2021-06-20 NOTE — PROGRESS NOTES
Subjective:   Sara Ashton is a(n) 78 y.o. White or  female who presents to [facility] with the following complaint(s):  POSS UTI (ABDOMINAL pain and low back pain, pressure, 4 days)    History of Present Illness:  Primary symptom: Urinary abnormality  Onset: 3 day(s) ago  Progression: Worsening  Dysuria: No  Polyuria: Yes  Nocturia: Yes  Urgency: Yes  Hematuria: No  Back / flank pain: Yes (back pain, not flank pain)  Suprapubic pain: Yes  Vaginal discharge: No  Fevers: No  Chills: No  History of urinary tract infection: Yes  History of pyelonephritis: No  History of nephrolithiasis: No  Menstrual status: Post-menopausal  Tobacco user: Previous smoker  Additional comments: None    The following portions of the patient's history were reviewed and updated as appropriate: allergies, current medications, past family history, past medical history, past social history, past surgical history and problem list.    Review of Systems:   Review of Systems   Constitutional: Negative for appetite change, chills and fever.   Respiratory: Negative for cough and shortness of breath.    Cardiovascular: Negative for chest pain and palpitations.   Gastrointestinal: Positive for abdominal pain. Negative for diarrhea, nausea and vomiting.   Skin: Negative for pallor and rash.   Neurological: Negative for syncope and light-headedness.     Objective:     Vitals:    09/17/18 1405   BP: 114/62   Patient Site: Left Arm   Patient Position: Sitting   Cuff Size: Adult Regular   Pulse: 79   Resp: 16   Temp: 97.7  F (36.5  C)   TempSrc: Oral   SpO2: 94%   Weight: 190 lb (86.2 kg)     Physical Exam   Constitutional: She is oriented to person, place, and time. She appears well-developed.  Non-toxic appearance. No distress.   Obese.   HENT:   Mouth/Throat: Mucous membranes are normal.   Cardiovascular: Normal rate, regular rhythm, S1 normal and S2 normal.  Exam reveals no gallop and no friction rub.    No murmur heard.  Pulmonary/Chest:  Effort normal and breath sounds normal. She has no wheezes. She has no rhonchi. She has no rales.   Abdominal: Soft. Bowel sounds are normal. She exhibits no distension. There is tenderness in the suprapubic area. There is no CVA tenderness.   Neurological: She is alert and oriented to person, place, and time.   Skin: Skin is warm and dry. No rash noted. No pallor.   Nursing note and vitals reviewed.    Laboratory:  Results for orders placed or performed in visit on 09/17/18   Urinalysis-UC if Indicated   Result Value Ref Range    Color, UA Yellow Colorless, Yellow, Straw, Light Yellow    Clarity, UA Clear Clear    Glucose, UA Negative Negative    Bilirubin, UA Negative Negative    Ketones, UA Negative Negative    Specific Gravity, UA 1.020 1.005 - 1.030    Blood, UA Negative Negative    pH, UA 5.0 5.0 - 8.0    Protein, UA Negative Negative mg/dL    Urobilinogen, UA 0.2 E.U./dL 0.2 E.U./dL, 1.0 E.U./dL    Nitrite, UA Negative Negative    Leukocytes, UA Small (!) Negative    Bacteria, UA  None Seen hpf    RBC, UA  None Seen, 0-2 hpf    WBC, UA  None Seen, 0-5 hpf    Squam Epithel, UA  None Seen, 0-5 lpf       Radiology:  N/A    Electrocardiogram:  N/A    Assessment/Plan   1. Acute cystitis without hematuria  - Urinalysis-UC if Indicated  - Culture, Urine  - nitrofurantoin, macrocrystal-monohydrate, (MACROBID) 100 MG capsule; Take 1 capsule (100 mg total) by mouth 2 (two) times a day for 7 days.  Dispense: 14 capsule; Refill: 0      - Counseled patient regarding assessment and plan for evaluation and treatment. Questions were answered. See AVS for the specific written instructions and educational handout(s) regarding UTI that were provided at the conclusion of the visit.   - Discussed signs / symptoms that warrant urgent / emergent medical attention.   - Follow up as needed. Recommend imaging with ultrasound or CT if symptoms persist despite treatment for UTI.     Jovi Moser MD

## 2021-06-20 NOTE — PROGRESS NOTES
ASSESMENT AND PLAN:  Diagnoses and all orders for this visit:    Lower abdominal pain  Unclear etiology.  Reviewed the recent clinic evaluation from the walk-in care.  Reviewed her urine culture, which was negative.  Given the diarrhea which could be being caused by the nitrofurantoin, and given the uric culture results, we decided to discontinue the nitrofurantoin.  We will proceed with imaging as ordered below and will call the patient with her results as soon as they are available.  I did  the patient on indications for emergency room evaluation in the meantime.  Depending on the results of the CT scan, additional evaluation may be required including colonoscopy or GYN evaluation.  Patient is in agreement with the plan.  -     CT Abdomen Pelvis With Oral With IV Contrast; Future; Expected date: 9/24/18    Major depression with anxiety  Counseling done today with the patient.  Given the worsening of her mood and the significant external stressors going on, we are going to increase fluoxetine from 20 mg/day to 40 mg/day.  Follow-up routinely in 2 weeks for recheck, sooner if worsening or problems.  -     FLUoxetine (PROZAC) 20 MG tablet; Take 2 tablets (40 mg total) by mouth daily.  Dispense: 180 tablet; Refill: 3          SUBJECTIVE: 78-year-old female who was in at walk-in [facility] with abdominal pain, started on treatment for presumed urinary tract infection, but urine culture came back negative.  She was called by the walk-in clinic and briefly stopped her nitrofurantoin but then restarted it when her symptoms did not improve.  She has noticed some mild nonbloody diarrhea since being on the antibiotic.  Patient reports that she has moderate to severe lower abdominal pain that seems to worsen when she has to urinate or have a bowel movement.  Emptying the bladder or passing stool does provide significant pain relief.  She did not notice any improvement in her symptoms with the initiation of treatment for the  presumed urinary tract infection.  No fevers, no vomiting, no vaginal bleeding or vaginal discharge.    Patient has been under increased stress recently.  She already has a stressful caregiver role being the primary caregiver at home for her  who has progressive dementia.  She still feels like she is managing overall okay with taking care of him but is discouraged by his slowly worsening irritability and temper.  She also recently had to help take care of her son who underwent a foot amputation because of a nonhealing ulcer.  All this has been pretty stressful for the patient and she has had some decreased ability to enjoy her life and increased depression.  This is despite taking fluoxetine 20 mg/day.    Past Medical History:   Diagnosis Date     Anxiety      Depression      Diverticulitis      GERD (gastroesophageal reflux disease)      Hyperlipidemia      Hypertension      Osteoarthritis      Patient Active Problem List   Diagnosis     Esophageal Reflux     Osteoporosis     Obesity     Anxiety     Diverticulosis     Benign Essential Hypertension     Osteopenia     Dermatitis     Hyperlipidemia     Pain During Urination (Dysuria)     Recurrent Major Depression In Partial Remission     Idiopathic Peripheral Neuropathy     Lower Back Pain     Cellulitis     Allergic Rhinitis     Localized Primary Osteoarthritis Of The Knee     Restless Legs Syndrome     Acute sinusitis      GERD (gastroesophageal reflux disease)     Neuropathy (H)     Osteoarthritis, multiple sites     Chest pain on breathing     Bronchitis with bronchospasm     Current Outpatient Prescriptions   Medication Sig Dispense Refill     acetaminophen (TYLENOL ARTHRITIS) 650 MG CR tablet Take 650 mg by mouth 4 (four) times a day as needed for pain.       albuterol (PROVENTIL) 2.5 mg /3 mL (0.083 %) nebulizer solution Take 3 mL (2.5 mg total) by nebulization every 6 (six) hours as needed for wheezing. 75 vial 3     ALLERGY AND CONGESTION RELIEF   mg per 24 hr tablet TAKE ONE TABLET BY MOUTH ONE TIME DAILY 90 tablet 1     amitriptyline (ELAVIL) 25 MG tablet TAKE ONE TABLET BY MOUTH NIGHTLY AT BEDTIME 60 tablet 3     aspirin 81 MG EC tablet Take 81 mg by mouth 3 (three) times a week.       CA CARB & GLUC/MAG OX & GLUC (CALCIUM MAGNESIUM ORAL) Take by mouth. 600-100-300 liquid       cetirizine-pseudoephedrine (ALLERGY RELIEF-D, CETIRIZINE,) 5-120 mg per tablet Take 1 tablet by mouth daily. 90 tablet 3     cholecalciferol, vitamin D3, (VITAMIN D3) 2,000 unit Tab Take 1 tablet by mouth daily.       ECHINACEA ORAL Take 1 capsule by mouth daily.       FLUoxetine (PROZAC) 20 MG tablet Take 2 tablets (40 mg total) by mouth daily. 180 tablet 3     fluticasone (FLONASE) 50 mcg/actuation nasal spray INSTILL 2 SPRAYS IN EACH NOSTRIL ONE TIME DAILY 16 g 6     FOLIC ACID/MULTIVITS-MIN/LUT (CENTRUM SILVER ORAL) Take 1 tablet by mouth 3 (three) times a day.       ginkgo biloba 40 mg Tab Take 1 tablet by mouth daily.       GLUCOSAMINE SULFATE (GLUCOSAMINE ORAL) Take 2 capsules by mouth daily.       HYDROcodone-acetaminophen 5-325 mg per tablet TAKE ONE TABLET BY MOUTH TWICE DAILY AS NEEDED FOR PAIN 60 tablet 0     LACTOBACILLUS ACIDOPHILUS (PROBIOTIC ORAL) Take by mouth daily.       lisinopril-hydrochlorothiazide (PRINZIDE,ZESTORETIC) 20-12.5 mg per tablet Take 1 tablet by mouth daily. 90 tablet 2     LYRICA 100 mg capsule TAKE 1 CAPSULE BY MOUTH TWICE A DAY 60 capsule 1     meloxicam (MOBIC) 15 MG tablet TAKE 1 TABLET BY MOUTH EVERY DAY 90 tablet 1     methocarbamol (ROBAXIN) 750 MG tablet TAKE 1 TABLET (750 MG) BY MOUTH EVERY EIGHT HOURS AS NEEDED. 90 tablet 0     nebulizer and compressor (VIOS AEROSOL DELIVERY SYSTEM) Kristina 1 nebulization every 6 hours as needed for wheezing. 1 each 0     nitrofurantoin, macrocrystal-monohydrate, (MACROBID) 100 MG capsule Take 1 capsule (100 mg total) by mouth 2 (two) times a day for 7 days. 14 capsule 0     olopatadine (PATANOL) 0.1  "% ophthalmic solution Administer 1 drop to both eyes 2 (two) times a day. 5 mL 6     omeprazole (PRILOSEC) 20 MG capsule TAKE ONE CAPSULE BY MOUTH ONE TIME DAILY 90 capsule 3     traZODone (DESYREL) 150 MG tablet Take 1 tablet (150 mg total) by mouth at bedtime. 90 tablet 3     VENTOLIN HFA 90 mcg/actuation inhaler INHALE 1-2 PUFFS EVERY 4 (FOUR) HOURS AS NEEDED FOR WHEEZING OR SHORTNESS OF BREATH (COUGH). 18 Inhaler 2     predniSONE (DELTASONE) 10 mg tablet Take 2 tablets (20 mg total) by mouth daily. 15 tablet 0     No current facility-administered medications for this visit.      History   Smoking Status     Former Smoker     Packs/day: 0.50     Years: 25.00   Smokeless Tobacco     Never Used     Comment: quit 25 yrs ago       OBJECTICE: /60 (Patient Site: Right Arm, Patient Position: Sitting, Cuff Size: Adult Regular)  Pulse 80  Temp 97.5  F (36.4  C) (Oral)   Resp 16  Ht 5' 1\" (1.549 m)  Wt 191 lb (86.6 kg)  SpO2 96%  Breastfeeding? No  BMI 36.09 kg/m2     No results found for this or any previous visit (from the past 24 hour(s)).     GEN-alert, appropriate, in no apparent distress   CV-regular rate and rhythm   RESP-lungs clear to auscultation   ABDOMINAL-soft, diffusely tender to palpation of the lower abdomen, no guarding or rebound, no palpable mass.   Psychiatric-mood is depressed, affect is tearful, speech is normal, appearance is well-groomed, thought content negative for suicidal or homicidal ideation, thought processing negative for paranoid or delusional thinking.   SKIN-no ulcers or vesicles      Agustín Craft          "

## 2021-06-20 NOTE — LETTER
Letter by Jade Narvaez, PharmANABELLA at      Author: Jade Narvaez, Deja Service: -- Author Type: --    Filed:  Encounter Date: 3/13/2020 Status: (Other)             3/16/2020      Sara Ashton  [address]  [facility] [address]            Dear Sara Ashton     Thank you for talking with me on 3/13/20 about your health and medications. [facility] MTM (Medication Therapy Management) program helps you understand your medications and use them safely.    Along with this letter are an action plan (Medication Action Plan) and a medication list (Personal Medication List). The action plan has steps you should take to help you get the best results from your medications. The medication list will help you keep track of your medications and how to use them the right way.       Have your action plan and medication list with you when you talk with your doctors, pharmacists, and other health care providers.    Ask your doctors, pharmacists, and other healthcare providers to update them at every visit.     Take your medication list with you if you go to the hospital or emergency room.     Give a copy of the action plan and medication list to your family or caregivers.     If you want to talk about this letter or any of the papers with it, please call [phone]. We look forward to working with you, your doctors, and other healthcare providers to help you stay healthy.    Sincerely,     Jade Narvaez    _  Medication Action Plan For Sara Ashton, : 1940     This action plan will help you get the best results from your medications if you:     1. Read What we talked about.   2. Take the steps listed in the What I need to do boxes.   3. Fill in What I did and when I did it.   4. Fill in My follow-up plan and Questions I want to ask.     Have this action plan with you when you talk with your doctors, pharmacists, and other healthcare providers in your care team. Share this with your family or caregivers too.    Date  Prepared: 3/16/2020      What we talked about:  We have not checked the density of your bones since 2012.      What I need to do:  You are due for a bone density scan (DEXA).    What I did and when I did it:          What we talked about:  What my medicines are for, how to know if my medicines are working, made sure my medicines are safe for me and reviewed how to take my medicines.      What I need to do:  Take my medicines every day     What I did and when I did it:         My follow-up plan (add notes about next steps):            Questions I want to ask (include topics about medications or therapy):          If you have any questions about your action plan, call Jade Narvaez at [phone], Monday-Friday 8:00-4:30pm  _  This medication list was made for you after we talked. We also used information from your doctors chart.      Use blank rows to add new medications. Then fill in the dates you started using them.     Cross out medications when you no longer use them. Then write the date and why you stopped using them.     Ask your doctors, pharmacists, and other healthcare providers to update this list at every visit.  Keep this list up-to-date with:  ? prescription medications  ? over the counter drugs  ? herbals  ? vitamins  ? minerals          If you go to the hospital or emergency room, take this list with you. Share this with your family or caregivers too.    Date Prepared: 3/16/2020      Allergies or side effects: No Known Allergies      Medication: acetaminophen (TYLENOL ARTHRITIS) 650 MG CR tablet      How I use it: Take 650 mg by mouth 4 (four) times a day as needed for pain.      Why I use it: Pain    Prescriber: Agustín Craft MD      Date I started using it:     Date I stopped using it:       Why I stopped using it:          Medication: albuterol (PROVENTIL) 2.5 mg /3 mL (0.083 %) nebulizer solution      How I use it: Take 3 mL (2.5 mg total) by nebulization every 6 (six) hours as needed for  wheezing.      Why I use it: Shortness of Breath    Prescriber: Toy Malone MD      Date I started using it:     Date I stopped using it:       Why I stopped using it:          Medication: aspirin 81 MG EC tablet      How I use it: Take 81 mg by mouth daily.       Why I use it: Stroke Prevention    Prescriber: Agustín Craft MD      Date I started using it:     Date I stopped using it:       Why I stopped using it:          Medication: calcium, as carbonate, (OS-JACKIE) 500 mg calcium (1,250 mg) tablet      How I use it: Take 1 tablet by mouth daily.      Why I use it: Bone Health    Prescriber: Agustín Craft MD      Date I started using it:     Date I stopped using it:       Why I stopped using it:          Medication: cholecalciferol, vitamin D3, (VITAMIN D3) 2,000 unit Tab      How I use it: Take 1 tablet by mouth daily.      Why I use it: Bone Health    Prescriber: Timmy Isaacs MD      Date I started using it:     Date I stopped using it:       Why I stopped using it:          Medication: fexofenadine (ALLEGRA) 180 MG tablet      How I use it: Take 180 mg by mouth daily.      Why I use it: Allergies    Prescriber: Agustín Craft MD      Date I started using it:     Date I stopped using it:       Why I stopped using it:          Medication: FLUoxetine (PROZAC) 20 MG capsule      How I use it: TAKE 3 CAPSULES BY MOUTH EVERY DAY      Why I use it: Major depressive disorder    Prescriber: Agustín Craft MD      Date I started using it:     Date I stopped using it:       Why I stopped using it:          Medication: fluticasone propionate (FLONASE) 50 mcg/actuation nasal spray      How I use it: INSTILL 2 SPRAYS IN EACH NOSTRIL ONE TIME DAILY      Why I use it: Allergic Rhinitis    Prescriber: Agustín Craft MD      Date I started using it:     Date I stopped using it:       Why I stopped using it:          Medication: furosemide (LASIX) 20 MG tablet      How I use it: Take 2 tablets (40 mg total) by mouth  daily.      Why I use it: Acute congestive heart failure    Prescriber: Agustín Craft MD      Date I started using it:     Date I stopped using it:       Why I stopped using it:          Medication: GLUCOSAMINE SULFATE (GLUCOSAMINE ORAL)      How I use it: Take 1 capsule by mouth daily.       Why I use it: Pain    Prescriber: Agustín Craft MD      Date I started using it:     Date I stopped using it:       Why I stopped using it:          Medication: hypromellose (ARTIFICIAL TEARS,[id number],) Drop      How I use it: Administer 1 drop to both eyes 4 (four) times a day as needed (dry eyes).      Why I use it: Dry eyes    Prescriber: Agustín Craft MD      Date I started using it:     Date I stopped using it:       Why I stopped using it:          Medication: LACTOBACILLUS ACIDOPHILUS (PROBIOTIC ORAL)      How I use it: Take by mouth daily.      Why I use it: Gut Health    Prescriber: Agustín Craft MD      Date I started using it:     Date I stopped using it:       Why I stopped using it:          Medication: levalbuterol (XOPENEX) 1.25 mg/3 mL nebulizer solution      How I use it: Take 3 mL every 4 hours as needed for Reactive Airway disease.      Why I use it: Bronchospasm    Prescriber: Ori Hill PA-C      Date I started using it:     Date I stopped using it:       Why I stopped using it:          Medication: lisinopril-hydrochlorothiazide (PRINZIDE,ZESTORETIC) 20-12.5 mg per tablet      How I use it: Take 1 tablet by mouth daily.      Why I use it: HTN (Hypertension)    Prescriber: Agustín Craft MD      Date I started using it:     Date I stopped using it:       Why I stopped using it:          Medication: meloxicam (MOBIC) 15 MG tablet      How I use it: TAKE 1 TABLET BY MOUTH EVERY DAY      Why I use it: Pain    Prescriber: Ori Hill PA-C      Date I started using it:     Date I stopped using it:       Why I stopped using it:          Medication: nebulizer and compressor (VIOS AEROSOL DELIVERY SYSTEM)  Kristina      How I use it: 1 nebulization every 6 hours as needed for wheezing.      Why I use it: Shortness of Breath    Prescriber: Toy Malone MD      Date I started using it:     Date I stopped using it:       Why I stopped using it:          Medication: omeprazole (PRILOSEC) 20 MG capsule      How I use it: TAKE 1 CAPSULE BY MOUTH EVERY DAY      Why I use it: GERD (Gastroesophageal Reflux Disease)    Prescriber: Agustín Craft MD      Date I started using it:     Date I stopped using it:       Why I stopped using it:          Medication: oxybutynin (DITROPAN XL) 5 MG ER tablet      How I use it: TAKE 1 TABLET BY MOUTH EVERY DAY      Why I use it: Overactive Bladder    Prescriber: Agustín Craft MD      Date I started using it:     Date I stopped using it:       Why I stopped using it:          Medication: pregabalin (LYRICA) 75 MG capsule      How I use it: Take 1 capsule (75 mg total) by mouth 2 (two) times a day.      Why I use it:  Neuropathy    Prescriber: Agustín Craft MD      Date I started using it:     Date I stopped using it:       Why I stopped using it:          Medication: traZODone (DESYREL) 150 MG tablet      How I use it: TAKE 1 TABLET (150 MG TOTAL) BY MOUTH AT BEDTIME.      Why I use it: Major depressive disorder    Prescriber: Ori Hill PA-C      Date I started using it:     Date I stopped using it:       Why I stopped using it:          Medication: VENTOLIN HFA 90 mcg/actuation inhaler      How I use it: INHALE 1-2 PUFFS EVERY FOUR HOURS AS NEEDED FOR WHEEZING, SHORTNESS OF BREATH, OR COUGH.      Why I use it: Dyspnea on Exertion    Prescriber: Agustín Craft MD      Date I started using it:     Date I stopped using it:       Why I stopped using it:          Medication:       How I use it:       Why I use it:     Prescriber:       Date I started using it:     Date I stopped using it:       Why I stopped using it:          Medication:       How I use it:       Why I use it:      Prescriber:       Date I started using it:     Date I stopped using it:       Why I stopped using it:          Medication:       How I use it:       Why I use it:     Prescriber:       Date I started using it:     Date I stopped using it:       Why I stopped using it:          Other Information:      If you have any questions about your medication list, call [phone]    According to the Paperwork Reduction Act of 1995, no persons are required to respond to a collection of information unless it displays a valid OMB control number. The valid OMB number for this information collection is [id number]. The time required to complete this information collection is estimated to average 37.76 minutes per response, including the time to review instructions, searching existing data resources, gather the data needed, and complete and review the information collection. If you have any comments concerning the accuracy of the time estimate(s) or suggestions for improving this form, please write to: CMS, Attn: EVELIN Reports Clearance Officer, [address] [place] [phone].

## 2021-06-21 NOTE — LETTER
Letter by Agustín Craft MD at      Author: Agustín Craft MD Service: -- Author Type: --    Filed:  Encounter Date: 2/4/2021 Status: (Other)               [address]  [place] [address]   PHONE [phone]  [phone]     February 4, 2021  Sara Ashton  [address] [place] [id number]    Dear Sara:    Below are the results from your recent visit.  Please share this with the kidney specialist.      Resulted Orders   Protein, 24 Hour Urine   Result Value Ref Range    Total Protein, 24 Hour Urine 1,447 (H) 0 - 150 mg/24hr     Protein, Random Urine 804 mg/dL    24H Urine Volume 180 mL         If you have any questions or concerns, please do not hesitate to call.    Sincerely,      Agustín Craft MD  February 4, 2021

## 2021-06-21 NOTE — LETTER
Letter by Agustín Craft MD at      Author: Agustín Craft MD Service: -- Author Type: --    Filed:  Encounter Date: 11/17/2020 Status: (Other)               [address]  [place] [address]   PHONE [phone]  [phone]     November 17, 2020  Sara CHAUDHARY Poli  [address] [facility] [address]    Dear Sara:    Below are the results from your recent visit.  Your creatinine kidney test has gotten slightly worse to compared to last time and therefore the blood testing should be rechecked again in 2 to 3 months.  Continue to work on good hydration.  You should not be using NSAIDs-that is the family of medication that includes aspirin, ibuprofen, naproxen.    Resulted Orders   Basic Metabolic Panel   Result Value Ref Range    Sodium 142 136 - 145 mmol/L    Potassium 5.0 3.5 - 5.0 mmol/L    Chloride 110 (H) 98 - 107 mmol/L    CO2 21 (L) 22 - 31 mmol/L    Anion Gap, Calculation 11 5 - 18 mmol/L    Glucose 118 70 - 125 mg/dL    Calcium 9.6 8.5 - 10.5 mg/dL    BUN 30 (H) 8 - 28 mg/dL    Creatinine 1.50 (H) 0.60 - 1.10 mg/dL    GFR MDRD Af Amer 41 (L) >60 mL/min/1.73m2    GFR MDRD Non Af Amer 33 (L) >60 mL/min/1.73m2    Narrative    Fasting Glucose reference range is 70-99 mg/dL per  American Diabetes Association (ADA) guidelines.       If you have any questions or concerns, please do not hesitate to call.    Sincerely,    Agustín Craft MD  November 17, 2020

## 2021-06-23 NOTE — TELEPHONE ENCOUNTER
Describe your symptoms: ongoing cough, worse at night time. Guaifenesin is not helping the cough.   Any pain: no  New/Ongoing: Ongoing  How long have you been having symptoms: 2  week(s)  Have you been seen for this: 1/17/19 Yes.  Have your symptoms changed since this visit? No  Triage offered?: No, patient states she wants Dr. Craft to call her  Home remedies tried: n/a  [facility]   Okay to leave a detailed message? Yes

## 2021-06-23 NOTE — TELEPHONE ENCOUNTER
See triage message below.  Can covering provider accommodate pt request:     Triaged to be seen today but patient declines an appointment at this time but does have an appointment with Dr Craft for 2/13. Requested a prescription for prednisone to help with shortness of breath. PCP to advise.     Thanks.

## 2021-06-23 NOTE — TELEPHONE ENCOUNTER
Controlled Substance Refill Request  Medication Name:   Requested Prescriptions     Pending Prescriptions Disp Refills     HYDROcodone-acetaminophen 5-325 mg per tablet 60 tablet 0     Sig: TAKE ONE TABLET BY MOUTH TWICE DAILY AS NEEDED FOR PAIN     Date Last Fill: 12/10/2018  [facility] [place]     Submit electronically to pharmacy  Controlled Substance Agreement Date Scanned:   Encounter-Level CSA Scan Date:    There are no encounter-level csa scan date.       Last office visit with prescriber/PCP: 10/11/2018 Agustín Craft MD OR same dept: 10/11/2018 Agustín Craft MD OR same specialty: 10/11/2018 Agustín Craft MD  Last physical: 11/1/2017 Last MTM visit: Visit date not found

## 2021-06-23 NOTE — TELEPHONE ENCOUNTER
Controlled Substance Refill Request  Medication:   Requested Prescriptions     Pending Prescriptions Disp Refills     LYRICA 100 mg capsule [Pharmacy Med Name: LYRICA 100 MG CAPSULE] 60 capsule 1     Sig: TAKE 1 CAPSULE BY MOUTH TWICE A DAY     Date Last Fill: 11/16/18  [id number]   Submit electronically to pharmacy  Controlled Substance Agreement on File:   Encounter-Level CSA Scan Date:    There are no encounter-level csa scan date.       Last office visit: Last office visit pertaining to requested medication was 10/11/18.

## 2021-06-23 NOTE — TELEPHONE ENCOUNTER
Discussed with patient.  She is now at 3 weeks of symptoms and is progressively worsening despite conservative treatment.  We reviewed the risks and benefits of azithromycin, she has tolerated the medication well in the past.  Prescription sent to her pharmacy, follow-up if problems or not improving.

## 2021-06-23 NOTE — PROGRESS NOTES
Chief Complaint   Patient presents with     Shortness of Breath     Respiratory Distress         HPI:   Sara Ashton is a 78 y.o. female c/o problems breathing--only with activity   Got cough about one month ago.  Initially treated with cough medication.  Didn't improve.Treated with zpack about one week ago. No improvement in shortness of breath.  Cough is some better.  Nebulizer twice a day, albuterol twice a day, no improvement in shortness of breath.  No fever.  No ear pain.  Mild nasal congestion.  No sore throat.  Chest feels tight.  No stomach symptoms    Quit smoking about 25 years ago.  Had evaluation by pulmonary in 2017--felt no COPD, consider pulmonary fibrosis, more likely bronchitis with allergies.    ROS:  A 10 point comprehensive review of systems was negative except as noted.     Medications:  Current Outpatient Medications on File Prior to Visit   Medication Sig Dispense Refill     acetaminophen (TYLENOL ARTHRITIS) 650 MG CR tablet Take 650 mg by mouth 4 (four) times a day as needed for pain.       albuterol (PROVENTIL) 2.5 mg /3 mL (0.083 %) nebulizer solution Take 3 mL (2.5 mg total) by nebulization every 6 (six) hours as needed for wheezing. 75 vial 3     ALLERGY AND CONGESTION RELIEF  mg per 24 hr tablet TAKE ONE TABLET BY MOUTH ONE TIME DAILY 90 tablet 1     amitriptyline (ELAVIL) 25 MG tablet TAKE ONE TABLET BY MOUTH NIGHTLY AT BEDTIME 60 tablet 3     aspirin 81 MG EC tablet Take 81 mg by mouth 3 (three) times a week.       CA CARB & GLUC/MAG OX & GLUC (CALCIUM MAGNESIUM ORAL) Take by mouth. 600-100-300 liquid       cetirizine-pseudoephedrine (ALLERGY RELIEF-D, CETIRIZINE,) 5-120 mg per tablet Take 1 tablet by mouth daily. 90 tablet 3     cholecalciferol, vitamin D3, (VITAMIN D3) 2,000 unit Tab Take 1 tablet by mouth daily.       codeine-guaiFENesin (GUAIFENESIN AC)  mg/5 mL liquid Take 5 mL by mouth at bedtime as needed for cough. 120 mL 0     ECHINACEA ORAL Take 1 capsule by  mouth daily.       FLUoxetine (PROZAC) 20 MG tablet Take 2 tablets (40 mg total) by mouth daily. 180 tablet 3     fluticasone (FLONASE) 50 mcg/actuation nasal spray INSTILL 2 SPRAYS IN EACH NOSTRIL ONE TIME DAILY 16 g 6     fluticasone (FLONASE) 50 mcg/actuation nasal spray INSTILL 2 SPRAYS IN EACH NOSTRIL ONE TIME DAILY 48 g 3     FOLIC ACID/MULTIVITS-MIN/LUT (CENTRUM SILVER ORAL) Take 1 tablet by mouth 3 (three) times a day.       ginkgo biloba 40 mg Tab Take 1 tablet by mouth daily.       GLUCOSAMINE SULFATE (GLUCOSAMINE ORAL) Take 2 capsules by mouth daily.       HYDROcodone-acetaminophen 5-325 mg per tablet TAKE ONE TABLET BY MOUTH TWICE DAILY AS NEEDED FOR PAIN 60 tablet 0     LACTOBACILLUS ACIDOPHILUS (PROBIOTIC ORAL) Take by mouth daily.       lisinopril-hydrochlorothiazide (PRINZIDE,ZESTORETIC) 20-12.5 mg per tablet Take 1 tablet by mouth daily. 90 tablet 2     LYRICA 100 mg capsule TAKE 1 CAPSULE BY MOUTH TWICE A DAY 60 capsule 1     meloxicam (MOBIC) 15 MG tablet Take 1 tablet (15 mg total) by mouth daily. 90 tablet 1     methocarbamol (ROBAXIN) 750 MG tablet TAKE 1 TABLET (750 MG) BY MOUTH EVERY EIGHT HOURS AS NEEDED. 90 tablet 0     nebulizer and compressor (VIOS AEROSOL DELIVERY SYSTEM) Kristina 1 nebulization every 6 hours as needed for wheezing. 1 each 0     olopatadine (PATANOL) 0.1 % ophthalmic solution Administer 1 drop to both eyes 2 (two) times a day. 5 mL 6     omeprazole (PRILOSEC) 20 MG capsule TAKE ONE CAPSULE BY MOUTH ONE TIME DAILY 90 capsule 3     oxybutynin (DITROPAN XL) 5 MG ER tablet Take 1 tablet (5 mg total) by mouth daily. 30 tablet 11     predniSONE (DELTASONE) 10 mg tablet Take 2 tablets (20 mg total) by mouth daily. 15 tablet 0     traZODone (DESYREL) 150 MG tablet Take 1 tablet (150 mg total) by mouth at bedtime. 90 tablet 3     VENTOLIN HFA 90 mcg/actuation inhaler INHALE 1-2 PUFFS EVERY 4 (FOUR) HOURS AS NEEDED FOR WHEEZING OR SHORTNESS OF BREATH (COUGH). 18 Inhaler 2     No  "current facility-administered medications on file prior to visit.          Social History:  Social History     Tobacco Use     Smoking status: Former Smoker     Packs/day: 0.50     Years: 25.00     Pack years: 12.50     Smokeless tobacco: Never Used     Tobacco comment: quit 25 yrs ago   Substance Use Topics     Alcohol use: Yes     Comment: occasional         Physical Exam:   Vitals:    02/01/19 1600   BP: 152/70   Patient Site: Right Arm   Patient Position: Sitting   Cuff Size: Adult Regular   Pulse: 78   Resp: 16   Temp: 98  F (36.7  C)   TempSrc: Oral   SpO2: 93%   Height: 5' 1\" (1.549 m)       GENERAL:   Alert. Oriented.  EYES: Clear  HENT:  Ears: R TM pearly gray. Normal landmarks. L TM pearly gray.  Normal landmarks  Nose: Clear.  Sinuses: Nontender.  Oropharynx:  No erythema. No exudate.  NECK: Supple. No adenopathy.  LUNGS: Clear to ascultation.  No crackles.  No wheezing  HEART: RRR  SKIN:  No rash.         Assessment/Plan:    1. Reactive airway disease without complication, unspecified asthma severity, unspecified whether persistent  predniSONE (DELTASONE) 20 MG tablet      Likely initial viral infection with reactive airways.  Lung exam is clear.  Discussed CXR today. With clear exam and good air movement, we opted to treat with 5 days of prednisone.  If she is worsening needs prompt evaluation.  Has appointment with her primary in about 1.5 weeks so will recheck at that time.      She brought up her use of lisinopril.  Discussed this doesn't sound like the cough that comes from lisinopril, so wouldn't stop at this time.        Yumiko Santiago MD      2/1/2019    The following portions of the patient's history were reviewed and updated as appropriate: allergies, current medications, past family history, past medical history, past social history, past surgical history and problem list.      "

## 2021-06-23 NOTE — TELEPHONE ENCOUNTER
Refill Approved    Rx renewed per Medication Renewal Policy. Medication was last renewed on 4/30/18.    Alena Jeffers, [facility] Triage/Med Refill 2/7/2019     Requested Prescriptions   Pending Prescriptions Disp Refills     olopatadine (PATANOL) 0.1 % ophthalmic solution [Pharmacy Med Name: OLOPATADINE HCL 0.1% EYE DROPS] 5 mL 6     Sig: ADMINISTER 1 DROP TO BOTH EYES 2 (TWO) TIMES A DAY.    Opthalmic Allergy Drops Refill Protocol Passed - 2/5/2019  5:11 PM       Passed - Patient has had office visit/physical in last 12 months    Last office visit with prescriber/PCP: 1/17/2019 Agustín Craft MD OR same dept: 2/1/2019 Yumiko Santiago MD OR same specialty: 2/1/2019 Yumiko Santiago MD  Last physical: 11/1/2017 Last MTM visit: Visit date not found   Next visit within 3 mo: Visit date not found  Next physical within 3 mo: Visit date not found  Prescriber OR PCP: Agustín Craft MD  Last diagnosis associated with med order: There are no diagnoses linked to this encounter.  If protocol passes may refill for 12 months if within 3 months of last provider visit (or a total of 15 months).

## 2021-06-23 NOTE — TELEPHONE ENCOUNTER
Triage call:   Was on a z-pack for a cold that she caught after returning from [place]. Symptoms present for 2 weeks but cold weather has been aggravating her shortness of breath recently. Has been using her Nebulizer twice a day, but no relief to shortness of breath. No wheezing, Mild shortness of breath when up walking. No cardiac or lung history- full pulmonary work up with no results. Patient is wondering she maybe has COPD as she was a smoker but is not currently. Back from [place] 3 weeks ago.     Triaged to be seen today but patient declines an appointment at this time but does have an appointment with Dr Craft for 2/13. Requested a prescription for prednisone to help with shortness of breath. PCP to advise.     *Ok to leave a detailed message upon call back    Pharm[facility] and allergies verified. [id number])    Abby Jackson RN BSBA [facility] Triage/Med Refill 2/1/2019 10:14 AM    Reason for Disposition    MILD difficulty breathing (e.g., minimal/no SOB at rest, SOB with walking, pulse < 100) of new onset or worse than normal    Protocols used: BREATHING DIFFICULTY-A-OH

## 2021-06-23 NOTE — PROGRESS NOTES
ASSESMENT AND PLAN:  Diagnoses and all orders for this visit:    Overactive bladder, Female stress incontinence  Reviewed differential diagnosis and treatment options today with the patient.  We are checking the urine as detailed below to make sure there is no component of infection.  Urinalysis initial results look low suspicion for infection.  We discussed the risks and benefits of prescription medication as prescribed below.  Follow-up if not improving or if problems.  -     oxybutynin (DITROPAN XL) 5 MG ER tablet  Dispense: 30 tablet; Refill: 11      Urine frequency  -     Urinalysis-UC if Indicated  -     Culture, Urine    Bronchitis with bronchospasm  Likely a viral trigger.  Improving symptoms as detailed below.  Codeine cough syrup at nighttime only for the more severe cough with over-the-counter cough syrup during the daytime if needed.  Patient was counseled extensively on the importance of not taking her codeine cough syrup within 4 hours of her hydrocodone that she uses for pain.  She is in agreement with the plan and we reviewed the risks and benefits of her medication.  -     codeine-guaiFENesin (GUAIFENESIN AC)  mg/5 mL liquid  Dispense: 120 mL; Refill: 0          SUBJECTIVE: 78-year-old female comes in with 2 main concerns.  First is a cough that has been going on for the past 10 days.  She had gone on a cruise recently and started coughing which has been minimally productive with no hemoptysis and no shortness of breath.  No chest pain.  No fevers.  However, the cough is been severe at times and is disrupting to her sleep.  She had some leftover codeine cough syrup that she used the last couple of nights with good results.  She is now out of this.  She is also been using her albuterol inhaler with good results, she still has plenty of that medication.  Patient also reports a concern about slowly worsening frequency of urination and urinary leakage over the past 3 months.  No dysuria, no gross  hematuria, no abdominal pain.  Patient reports that the urine leakage is worse when she coughs.  It has gotten to the point where she is having to use absorbent pads and it is bothering her enough that she would like to consider using a medication to try to help with this problem.    Past Medical History:   Diagnosis Date     Anxiety      Depression      Diverticulitis      GERD (gastroesophageal reflux disease)      Hyperlipidemia      Hypertension      Osteoarthritis      Patient Active Problem List   Diagnosis     Esophageal Reflux     Osteoporosis     Obesity     Anxiety     Diverticulosis     Benign Essential Hypertension     Osteopenia     Dermatitis     Hyperlipidemia     Pain During Urination (Dysuria)     Major depression     Idiopathic Peripheral Neuropathy     Lower Back Pain     Cellulitis     Allergic Rhinitis     Localized Primary Osteoarthritis Of The Knee     Restless Legs Syndrome     Acute sinusitis      GERD (gastroesophageal reflux disease)     Neuropathy (H)     Osteoarthritis, multiple sites     Chest pain on breathing     Bronchitis with bronchospasm     Current Outpatient Medications   Medication Sig Dispense Refill     acetaminophen (TYLENOL ARTHRITIS) 650 MG CR tablet Take 650 mg by mouth 4 (four) times a day as needed for pain.       albuterol (PROVENTIL) 2.5 mg /3 mL (0.083 %) nebulizer solution Take 3 mL (2.5 mg total) by nebulization every 6 (six) hours as needed for wheezing. 75 vial 3     ALLERGY AND CONGESTION RELIEF  mg per 24 hr tablet TAKE ONE TABLET BY MOUTH ONE TIME DAILY 90 tablet 1     amitriptyline (ELAVIL) 25 MG tablet TAKE ONE TABLET BY MOUTH NIGHTLY AT BEDTIME 60 tablet 3     aspirin 81 MG EC tablet Take 81 mg by mouth 3 (three) times a week.       CA CARB & GLUC/MAG OX & GLUC (CALCIUM MAGNESIUM ORAL) Take by mouth. 600-100-300 liquid       cetirizine-pseudoephedrine (ALLERGY RELIEF-D, CETIRIZINE,) 5-120 mg per tablet Take 1 tablet by mouth daily. 90 tablet 3      cholecalciferol, vitamin D3, (VITAMIN D3) 2,000 unit Tab Take 1 tablet by mouth daily.       ECHINACEA ORAL Take 1 capsule by mouth daily.       FLUoxetine (PROZAC) 20 MG tablet Take 2 tablets (40 mg total) by mouth daily. 180 tablet 3     fluticasone (FLONASE) 50 mcg/actuation nasal spray INSTILL 2 SPRAYS IN EACH NOSTRIL ONE TIME DAILY 16 g 6     fluticasone (FLONASE) 50 mcg/actuation nasal spray INSTILL 2 SPRAYS IN EACH NOSTRIL ONE TIME DAILY 48 g 3     FOLIC ACID/MULTIVITS-MIN/LUT (CENTRUM SILVER ORAL) Take 1 tablet by mouth 3 (three) times a day.       ginkgo biloba 40 mg Tab Take 1 tablet by mouth daily.       GLUCOSAMINE SULFATE (GLUCOSAMINE ORAL) Take 2 capsules by mouth daily.       HYDROcodone-acetaminophen 5-325 mg per tablet TAKE ONE TABLET BY MOUTH TWICE DAILY AS NEEDED FOR PAIN 60 tablet 0     LACTOBACILLUS ACIDOPHILUS (PROBIOTIC ORAL) Take by mouth daily.       lisinopril-hydrochlorothiazide (PRINZIDE,ZESTORETIC) 20-12.5 mg per tablet Take 1 tablet by mouth daily. 90 tablet 2     LYRICA 100 mg capsule TAKE 1 CAPSULE BY MOUTH TWICE A DAY 60 capsule 1     meloxicam (MOBIC) 15 MG tablet Take 1 tablet (15 mg total) by mouth daily. 90 tablet 1     methocarbamol (ROBAXIN) 750 MG tablet TAKE 1 TABLET (750 MG) BY MOUTH EVERY EIGHT HOURS AS NEEDED. 90 tablet 0     nebulizer and compressor ([employer] AEROSOL DELIVERY SYSTEM) Kristina 1 nebulization every 6 hours as needed for wheezing. 1 each 0     olopatadine (PATANOL) 0.1 % ophthalmic solution Administer 1 drop to both eyes 2 (two) times a day. 5 mL 6     omeprazole (PRILOSEC) 20 MG capsule TAKE ONE CAPSULE BY MOUTH ONE TIME DAILY 90 capsule 3     predniSONE (DELTASONE) 10 mg tablet Take 2 tablets (20 mg total) by mouth daily. 15 tablet 0     traZODone (DESYREL) 150 MG tablet Take 1 tablet (150 mg total) by mouth at bedtime. 90 tablet 3     VENTOLIN HFA 90 mcg/actuation inhaler INHALE 1-2 PUFFS EVERY 4 (FOUR) HOURS AS NEEDED FOR WHEEZING OR SHORTNESS OF BREATH (COUGH).  "18 Inhaler 2     codeine-guaiFENesin (GUAIFENESIN AC)  mg/5 mL liquid Take 5 mL by mouth at bedtime as needed for cough. 120 mL 0     oxybutynin (DITROPAN XL) 5 MG ER tablet Take 1 tablet (5 mg total) by mouth daily. 30 tablet 11     No current facility-administered medications for this visit.      Social History     Tobacco Use   Smoking Status Former Smoker     Packs/day: 0.50     Years: 25.00     Pack years: 12.50   Smokeless Tobacco Never Used   Tobacco Comment    quit 25 yrs ago       OBJECTICE: /60   Pulse 67   Temp 97.6  F (36.4  C) (Oral)   Resp 20   Ht 5' 1\" (1.549 m)   SpO2 98%   BMI 36.28 kg/m       Recent Results (from the past 24 hour(s))   Urinalysis-UC if Indicated    Collection Time: 01/17/19  1:21 PM   Result Value Ref Range    Color, UA Yellow Colorless, Yellow, Straw, Light Yellow    Clarity, UA Clear Clear    Glucose, UA Negative Negative    Bilirubin, UA Negative Negative    Ketones, UA Negative Negative    Specific Gravity, UA 1.010 1.005 - 1.030    Blood, UA Negative Negative    pH, UA 5.0 5.0 - 8.0    Protein, UA Trace (!) Negative mg/dL    Urobilinogen, UA 0.2 E.U./dL 0.2 E.U./dL, 1.0 E.U./dL    Nitrite, UA Negative Negative    Leukocytes, UA Small (!) Negative        GEN-alert, appropriate, in no apparent distress   HEENT-oropharynx is clear, neck is supple   CV-regular rate and rhythm   RESP-lungs clear to auscultation   ABDOMINAL-soft, nontender, no palpable masses   EXTREM-warm with trace ankle edema bilaterally   SKIN-no ulcers or vesicles      Agustín Craft          "

## 2021-06-24 NOTE — PROGRESS NOTES
ASSESMENT AND PLAN:  Diagnoses and all orders for this visit:    Dyspnea on exertion  Reviewed the differential diagnosis with the patient.  I think it is most likely diastolic dysfunction given her normal stress test with normal ejection fraction recently.  We are going to get an echo as prescribed below.  She has had a partial response as detailed below to furosemide and this will be continued at 40 mg daily.  Her echo is being scheduled for next week and I will follow-up with her by phone as soon as I get the report.  Patient again counseled on indications for emergent follow-up.  -     Echo Complete    Likely Acute congestive heart failure, unspecified heart failure type (H)  -     furosemide (LASIX) 40 MG tablet  Dispense: 14 tablet; Refill: 0          SUBJECTIVE: 78-year-old female here for follow-up.  She has noticed some improvement in her shortness of breath since starting on furosemide.  She is tolerating the medication well besides noticing that she has to urinate a lot more frequently during the daytime, she is taking the furosemide in the morning.  She still feels like she has mucus congestion in her chest.  She is been trying some over-the-counter Mucinex without much improvement.  No chest pain.  No fevers.  She is not experiencing shortness of breath at rest.  She is now not experiencing shortness of breath with light exertion but still is feeling short of breath when she gets to moderate level exertion.  This is an improvement for her since starting on the medication.    Past Medical History:   Diagnosis Date     Anxiety      Bronchitis with bronchospasm 6/11/2018     Depression      Diverticulitis      GERD (gastroesophageal reflux disease)      Hyperlipidemia      Hypertension      Osteoarthritis      Patient Active Problem List   Diagnosis     Osteoporosis     Obesity     Anxiety     Diverticulosis     Benign Essential Hypertension     Osteopenia     Dermatitis     Hyperlipidemia     Major  depression     Lower Back Pain     Allergic Rhinitis     Localized Primary Osteoarthritis Of The Knee     Restless Legs Syndrome     GERD (gastroesophageal reflux disease)     Neuropathy (H)     Osteoarthritis, multiple sites     Current Outpatient Medications   Medication Sig Dispense Refill     acetaminophen (TYLENOL ARTHRITIS) 650 MG CR tablet Take 650 mg by mouth 4 (four) times a day as needed for pain.       albuterol (PROVENTIL) 2.5 mg /3 mL (0.083 %) nebulizer solution Take 3 mL (2.5 mg total) by nebulization every 6 (six) hours as needed for wheezing. 75 vial 3     amitriptyline (ELAVIL) 25 MG tablet TAKE ONE TABLET BY MOUTH NIGHTLY AT BEDTIME 60 tablet 3     aspirin 81 MG EC tablet Take 81 mg by mouth 3 (three) times a week.       CA CARB & GLUC/MAG OX & GLUC (CALCIUM MAGNESIUM ORAL) Take by mouth. 600-100-300 liquid       cetirizine-pseudoephedrine (ALLERGY RELIEF-D, CETIRIZINE,) 5-120 mg per tablet Take 1 tablet by mouth daily. 90 tablet 3     cholecalciferol, vitamin D3, (VITAMIN D3) 2,000 unit Tab Take 1 tablet by mouth daily.       ECHINACEA ORAL Take 1 capsule by mouth daily.       FLUoxetine (PROZAC) 20 MG tablet Take 2 tablets (40 mg total) by mouth daily. 180 tablet 3     fluticasone (FLONASE) 50 mcg/actuation nasal spray INSTILL 2 SPRAYS IN EACH NOSTRIL ONE TIME DAILY 16 g 6     fluticasone (FLONASE) 50 mcg/actuation nasal spray INSTILL 2 SPRAYS IN EACH NOSTRIL ONE TIME DAILY 48 g 3     FOLIC ACID/MULTIVITS-MIN/LUT (CENTRUM SILVER ORAL) Take 1 tablet by mouth 3 (three) times a day.       furosemide (LASIX) 40 MG tablet Take 1 tablet (40 mg total) by mouth daily. 14 tablet 0     ginkgo biloba 40 mg Tab Take 1 tablet by mouth daily.       GLUCOSAMINE SULFATE (GLUCOSAMINE ORAL) Take 2 capsules by mouth daily.       HYDROcodone-acetaminophen 5-325 mg per tablet TAKE ONE TABLET BY MOUTH TWICE DAILY AS NEEDED FOR PAIN 60 tablet 0     LACTOBACILLUS ACIDOPHILUS (PROBIOTIC ORAL) Take by mouth daily.        "levalbuterol (XOPENEX) 1.25 mg/3 mL nebulizer solution Take 3 mL (1.25 mg total) by nebulization every 4 (four) hours as needed for wheezing. 75 mL 6     lisinopril-hydrochlorothiazide (PRINZIDE,ZESTORETIC) 20-12.5 mg per tablet Take 1 tablet by mouth daily. 90 tablet 2     LYRICA 100 mg capsule TAKE 1 CAPSULE BY MOUTH TWICE A DAY 60 capsule 1     meloxicam (MOBIC) 15 MG tablet Take 1 tablet (15 mg total) by mouth daily. 90 tablet 1     methocarbamol (ROBAXIN) 750 MG tablet TAKE 1 TABLET (750 MG) BY MOUTH EVERY EIGHT HOURS AS NEEDED. 90 tablet 0     nebulizer and compressor ([employer] AEROSOL DELIVERY SYSTEM) Kristina 1 nebulization every 6 hours as needed for wheezing. 1 each 0     olopatadine (PATANOL) 0.1 % ophthalmic solution ADMINISTER 1 DROP TO BOTH EYES 2 (TWO) TIMES A DAY. 5 mL 10     omeprazole (PRILOSEC) 20 MG capsule TAKE ONE CAPSULE BY MOUTH ONE TIME DAILY 90 capsule 3     oxybutynin (DITROPAN XL) 5 MG ER tablet Take 1 tablet (5 mg total) by mouth daily. 30 tablet 11     traZODone (DESYREL) 150 MG tablet Take 1 tablet (150 mg total) by mouth at bedtime. 90 tablet 3     VENTOLIN HFA 90 mcg/actuation inhaler INHALE 1-2 PUFFS EVERY 4 (FOUR) HOURS AS NEEDED FOR WHEEZING OR SHORTNESS OF BREATH (COUGH). 18 Inhaler 2     No current facility-administered medications for this visit.      Social History     Tobacco Use   Smoking Status Former Smoker     Packs/day: 0.50     Years: 25.00     Pack years: 12.50   Smokeless Tobacco Never Used   Tobacco Comment    quit 25 yrs ago       OBJECTICE: /60   Pulse 73   Temp 98.1  F (36.7  C) (Oral)   Resp 20   Ht 5' 1\" (1.549 m)   SpO2 91%   BMI 36.28 kg/m       No results found for this or any previous visit (from the past 24 hour(s)).     GEN-alert, appropriate, in no apparent distress   HEENT-oropharynx is clear, mucous membranes are moist, neck is supple   CV-regular rate and rhythm   RESP-lungs clear to auscultation   ABDOMINAL-soft and nontender   EXTREM-warm, no " ankle edema   SKIN-no ulcers or vesicles      Agustín Craft

## 2021-06-24 NOTE — TELEPHONE ENCOUNTER
RN cannot approve Refill Request    RN can NOT refill this medication med is not covered by policy/route to provider. Last office visit: Visit date not found Last Physical: Visit date not found Last MTM visit: Visit date not found Last visit same specialty: 2/13/2019 Agustín Craft MD.  Next visit within 3 mo: Visit date not found  Next physical within 3 mo: Visit date not found      Soraya Rivera, [facility] Triage/Med Refill 2/15/2019    Requested Prescriptions   Pending Prescriptions Disp Refills     methocarbamol (ROBAXIN) 750 MG tablet [Pharmacy Med Name: METHOCARBAMOL 750 MG TABLET] 90 tablet 0     Sig: TAKE 1 TABLET (750 MG) BY MOUTH EVERY EIGHT HOURS AS NEEDED.    There is no refill protocol information for this order

## 2021-06-24 NOTE — TELEPHONE ENCOUNTER
Refill Approved    Rx renewed per Medication Renewal Policy. Medication was last renewed on 6/3/18.    Last office visit 2/21/19    Timmy Churchill, [facility] Triage/Med Refill 3/14/2019     Requested Prescriptions   Pending Prescriptions Disp Refills     lisinopril-hydrochlorothiazide (PRINZIDE,ZESTORETIC) 20-12.5 mg per tablet [Pharmacy Med Name: LISINOPRIL-HCTZ 20-12.5 MG TAB] 90 tablet 2     Sig: TAKE 1 TABLET BY MOUTH EVERY DAY    Diuretics/Combination Diuretics Refill Protocol  Passed - 3/10/2019 12:32 AM       Passed - Visit with PCP or prescribing provider visit in past 12 months    Last office visit with prescriber/PCP: 2/21/2019 Agustín Craft MD OR same dept: 2/21/2019 Agustín Craft MD OR same specialty: 2/21/2019 Agustín Craft MD  Last physical: 11/1/2017 Last MTM visit: Visit date not found   Next visit within 3 mo: Visit date not found  Next physical within 3 mo: Visit date not found  Prescriber OR PCP: Agustín Craft MD  Last diagnosis associated with med order: 1. HTN (hypertension)  - lisinopril-hydrochlorothiazide (PRINZIDE,ZESTORETIC) 20-12.5 mg per tablet [Pharmacy Med Name: LISINOPRIL-HCTZ 20-12.5 MG TAB]; TAKE 1 TABLET BY MOUTH EVERY DAY  Dispense: 90 tablet; Refill: 2    If protocol passes may refill for 12 months if within 3 months of last provider visit (or a total of 15 months).            Passed - Serum Potassium in past 12 months     Lab Results   Component Value Date    Potassium 4.6 06/11/2018            Passed - Serum Sodium in past 12 months     Lab Results   Component Value Date    Sodium 141 06/11/2018            Passed - Blood pressure on file in past 12 months    BP Readings from Last 1 Encounters:   02/27/19 120/60            Passed - Serum Creatinine in past 12 months     Creatinine   Date Value Ref Range Status   06/11/2018 0.94 0.60 - 1.10 mg/dL Final

## 2021-06-24 NOTE — PROGRESS NOTES
ASSESMENT AND PLAN:  Diagnoses and all orders for this visit:    Acute congestive heart failure, unspecified heart failure type, likely diastolic (H)-new diagnosis  Likely diastolic.  Reviewed her previous cardiac testing which included a normal stress test in 2017 which showed normal ejection fraction.  We are going to treat with furosemide as ordered below.  Close follow-up here in the clinic in 1 week for recheck, detailed counseling with the patient around indications for emergent follow-up in the meantime.  Next week we can discuss likely echo as next step in the workup.  -     furosemide (LASIX) 40 MG tablet  Dispense: 14 tablet; Refill: 0    Shortness of breath  -     XR Chest 2 Views done today and reviewed by me personally shows some mild CHF.  Plan as detailed above.    Bronchospasm  I do not think this is a significant component of her current symptoms.  The patient requests switching from albuterol to Xopenex nebs and I counseled her that her insurance probably will not cover it but we can try sending the prescription through.  -     levalbuterol (XOPENEX) 1.25 mg/3 mL nebulizer solution  Dispense: 75 mL; Refill: 6    Overactive bladder, Female stress incontinence  Some improvement with oxybutynin, will refill as ordered below.  -     oxybutynin (DITROPAN XL) 5 MG ER tablet  Dispense: 30 tablet; Refill: 11      Neuropathy (H), chronic pain  Refill-     HYDROcodone-acetaminophen 5-325 mg per tablet  Dispense: 60 tablet; Refill: 0  We have reviewed in detail the risks and benefits of the medication.          SUBJECTIVE: 78-year-old female comes in for follow-up.  Please see previous recent clinic notes for details, she has been treated for respiratory infection initially conservative treatment then antibiotics and then steroids.  She showed some improvement with the steroids.  Her cough has largely resolved.  However, she feels an ongoing and slowly worsening sensation of shortness of breath with exertion.   No shortness of breath at rest.  With light physical exertion she starts to feel some mild shortness of breath and then with more moderate physical exertion she gets a more moderate shortness of breath that causes her to stop and rest.  Her shortness of breath goes away within a minute.  Along with the shortness of breath on exertion she does not get any chest pain or pressure or diaphoresis or nausea or palpitations or near syncope.  Patient has had some issues with shortness of breath in the past that have been worked up extensively.  This included a normal cardiac stress test back in 2017 as well as a pulmonary consultation, we reviewed those results today in detail and counseling done with the patient on the results.  Patient has a history of bronchospasm and has been using albuterol for her current symptoms with no improvement in the shortness of breath.  She has a friend that switched from albuterol to Xopenex with good results and she is wondering about making the same change.  Patient had been started on oxybutynin and did notice some mild improvements in her leaking of urine and urinary urgency.    Review of systems: No fever, no chest pain, no vomiting, no diarrhea, no blood in the stool or urine, remainder of review of systems is as above or negative.    Past Medical History:   Diagnosis Date     Anxiety      Bronchitis with bronchospasm 6/11/2018     Depression      Diverticulitis      GERD (gastroesophageal reflux disease)      Hyperlipidemia      Hypertension      Osteoarthritis      Patient Active Problem List   Diagnosis     Osteoporosis     Obesity     Anxiety     Diverticulosis     Benign Essential Hypertension     Osteopenia     Dermatitis     Hyperlipidemia     Major depression     Lower Back Pain     Allergic Rhinitis     Localized Primary Osteoarthritis Of The Knee     Restless Legs Syndrome     GERD (gastroesophageal reflux disease)     Neuropathy (H)     Osteoarthritis, multiple sites      Current Outpatient Medications   Medication Sig Dispense Refill     acetaminophen (TYLENOL ARTHRITIS) 650 MG CR tablet Take 650 mg by mouth 4 (four) times a day as needed for pain.       albuterol (PROVENTIL) 2.5 mg /3 mL (0.083 %) nebulizer solution Take 3 mL (2.5 mg total) by nebulization every 6 (six) hours as needed for wheezing. 75 vial 3     amitriptyline (ELAVIL) 25 MG tablet TAKE ONE TABLET BY MOUTH NIGHTLY AT BEDTIME 60 tablet 3     aspirin 81 MG EC tablet Take 81 mg by mouth 3 (three) times a week.       CA CARB & GLUC/MAG OX & GLUC (CALCIUM MAGNESIUM ORAL) Take by mouth. 600-100-300 liquid       cetirizine-pseudoephedrine (ALLERGY RELIEF-D, CETIRIZINE,) 5-120 mg per tablet Take 1 tablet by mouth daily. 90 tablet 3     cholecalciferol, vitamin D3, (VITAMIN D3) 2,000 unit Tab Take 1 tablet by mouth daily.       ECHINACEA ORAL Take 1 capsule by mouth daily.       FLUoxetine (PROZAC) 20 MG tablet Take 2 tablets (40 mg total) by mouth daily. 180 tablet 3     fluticasone (FLONASE) 50 mcg/actuation nasal spray INSTILL 2 SPRAYS IN EACH NOSTRIL ONE TIME DAILY 16 g 6     fluticasone (FLONASE) 50 mcg/actuation nasal spray INSTILL 2 SPRAYS IN EACH NOSTRIL ONE TIME DAILY 48 g 3     FOLIC ACID/MULTIVITS-MIN/LUT (CENTRUM SILVER ORAL) Take 1 tablet by mouth 3 (three) times a day.       ginkgo biloba 40 mg Tab Take 1 tablet by mouth daily.       GLUCOSAMINE SULFATE (GLUCOSAMINE ORAL) Take 2 capsules by mouth daily.       HYDROcodone-acetaminophen 5-325 mg per tablet TAKE ONE TABLET BY MOUTH TWICE DAILY AS NEEDED FOR PAIN 60 tablet 0     LACTOBACILLUS ACIDOPHILUS (PROBIOTIC ORAL) Take by mouth daily.       lisinopril-hydrochlorothiazide (PRINZIDE,ZESTORETIC) 20-12.5 mg per tablet Take 1 tablet by mouth daily. 90 tablet 2     LYRICA 100 mg capsule TAKE 1 CAPSULE BY MOUTH TWICE A DAY 60 capsule 1     meloxicam (MOBIC) 15 MG tablet Take 1 tablet (15 mg total) by mouth daily. 90 tablet 1     methocarbamol (ROBAXIN) 750 MG  "tablet TAKE 1 TABLET (750 MG) BY MOUTH EVERY EIGHT HOURS AS NEEDED. 90 tablet 0     nebulizer and compressor ([employer] AEROSOL DELIVERY SYSTEM) Kristina 1 nebulization every 6 hours as needed for wheezing. 1 each 0     olopatadine (PATANOL) 0.1 % ophthalmic solution ADMINISTER 1 DROP TO BOTH EYES 2 (TWO) TIMES A DAY. 5 mL 10     omeprazole (PRILOSEC) 20 MG capsule TAKE ONE CAPSULE BY MOUTH ONE TIME DAILY 90 capsule 3     traZODone (DESYREL) 150 MG tablet Take 1 tablet (150 mg total) by mouth at bedtime. 90 tablet 3     VENTOLIN HFA 90 mcg/actuation inhaler INHALE 1-2 PUFFS EVERY 4 (FOUR) HOURS AS NEEDED FOR WHEEZING OR SHORTNESS OF BREATH (COUGH). 18 Inhaler 2     furosemide (LASIX) 40 MG tablet Take 1 tablet (40 mg total) by mouth daily. 14 tablet 0     levalbuterol (XOPENEX) 1.25 mg/3 mL nebulizer solution Take 3 mL (1.25 mg total) by nebulization every 4 (four) hours as needed for wheezing. 75 mL 6     oxybutynin (DITROPAN XL) 5 MG ER tablet Take 1 tablet (5 mg total) by mouth daily. 30 tablet 11     No current facility-administered medications for this visit.      Social History     Tobacco Use   Smoking Status Former Smoker     Packs/day: 0.50     Years: 25.00     Pack years: 12.50   Smokeless Tobacco Never Used   Tobacco Comment    quit 25 yrs ago       OBJECTICE: /52 (Patient Site: Right Arm, Patient Position: Sitting, Cuff Size: Adult Large)   Pulse 70   Temp 98.1  F (36.7  C) (Oral)   Resp 16   Ht 5' 1\" (1.549 m)   SpO2 94%   BMI 36.28 kg/m       No results found for this or any previous visit (from the past 24 hour(s)).     GEN-alert, appropriate, in no apparent distress   HEENT-oropharynx is clear, neck is supple without palpable mass or lymphadenopathy   CV-regular rate and rhythm with no murmur   RESP-lungs clear to auscultation   ABDOMINAL-soft and nontender to palpation   EXTREM-warm, trace bilateral ankle edema, pitting edema present.   SKIN-no ulcers or vesicles   Neurologic-cranial nerves II " through XII are intact, strength and sensation are intact and symmetric.      Agustín Craft

## 2021-06-25 NOTE — PROGRESS NOTES
ASSESMENT AND PLAN:  Diagnoses and all orders for this visit:    Diastolic dysfunction  Counseled the patient on her echo results.  Clinically she is had an excellent response to furosemide with complete resolution of her symptoms.  She is now down to 20 mg/day and will continue on this with follow-up here in the clinic in about 6 weeks for recheck with basic metabolic panel at that time.    Chronic pain secondary to osteoarthritis of multiple joints, unspecified osteoarthritis type, Neuropathy (H)  Extensive counseling today with the patient around the risks and benefits of her medications and the change in guidelines for management of chronic pain.  She is willing to try titrating down her dosing, starting with the Lyrica dosing will be reduced from 100 mg twice daily down to 75 mg twice daily.  Follow-up for recheck in 6 weeks as detailed above.  -     pregabalin (LYRICA) 75 MG capsule  Dispense: 60 capsule; Refill: 3  -     HYDROcodone-acetaminophen 5-325 mg per tablet  Dispense: 60 tablet; Refill: 0      Dry eyes  -     hypromellose (ARTIFICIAL TEARS,[id number],) Drop  Dispense: 30 mL; Refill: 11          SUBJECTIVE: 78-year-old female with complete resolution of the shortness of breath and respiratory symptoms that she had been dealing with over this last several visits, please see those notes for details.  Her echo showed good systolic function but showed impaired diastolic relaxation.  With the furosemide she is had 100% resolution of her symptoms.  She is feeling back to normal, she has good levels of energy, no shortness of breath at rest, no chest pain.  Patient continues to struggle with chronic pain related to her neuropathy and osteoarthritis.  She reports that if she does not take her hydrocodone and gabapentin twice per day she has debilitating symptoms and it is very hard for her to manage, she has extra physical stressors because she does a lot to take care of her  who has progressive  dementia.  However, she does not feels overwhelming at this point, she is not considering moving out of their home at this time.  Patient reports a mild irritation to the eyes bilaterally.  No associated visual changes.    Past Medical History:   Diagnosis Date     Anxiety      Bronchitis with bronchospasm 6/11/2018     Depression      Diverticulitis      GERD (gastroesophageal reflux disease)      Hyperlipidemia      Hypertension      Osteoarthritis      Patient Active Problem List   Diagnosis     Osteoporosis     Obesity     Anxiety     Diverticulosis     Benign Essential Hypertension     Osteopenia     Dermatitis     Hyperlipidemia     Major depression     Lower Back Pain     Allergic Rhinitis     Localized Primary Osteoarthritis Of The Knee     Restless Legs Syndrome     GERD (gastroesophageal reflux disease)     Neuropathy (H)     Osteoarthritis, multiple sites     Diastolic dysfunction     Current Outpatient Medications   Medication Sig Dispense Refill     acetaminophen (TYLENOL ARTHRITIS) 650 MG CR tablet Take 650 mg by mouth 4 (four) times a day as needed for pain.       albuterol (PROVENTIL) 2.5 mg /3 mL (0.083 %) nebulizer solution Take 3 mL (2.5 mg total) by nebulization every 6 (six) hours as needed for wheezing. 75 vial 3     amitriptyline (ELAVIL) 25 MG tablet TAKE ONE TABLET BY MOUTH NIGHTLY AT BEDTIME 60 tablet 3     aspirin 81 MG EC tablet Take 81 mg by mouth daily.              cetirizine-pseudoephedrine (ALLERGY RELIEF-D, CETIRIZINE,) 5-120 mg per tablet Take 1 tablet by mouth daily. 90 tablet 3     cholecalciferol, vitamin D3, (VITAMIN D3) 2,000 unit Tab Take 1 tablet by mouth daily.       FLUoxetine (PROZAC) 20 MG tablet Take 2 tablets (40 mg total) by mouth daily. 180 tablet 3     fluticasone (FLONASE) 50 mcg/actuation nasal spray INSTILL 2 SPRAYS IN EACH NOSTRIL ONE TIME DAILY 16 g 6     furosemide (LASIX) 20 MG tablet Take 1 tablet (20 mg total) by mouth daily. 30 tablet 6     ginkgo biloba  40 mg Tab Take 1 tablet by mouth daily.       GLUCOSAMINE SULFATE (GLUCOSAMINE ORAL) Take 2 capsules by mouth daily.       HYDROcodone-acetaminophen 5-325 mg per tablet TAKE ONE TABLET BY MOUTH TWICE DAILY AS NEEDED FOR PAIN 60 tablet 0     LACTOBACILLUS ACIDOPHILUS (PROBIOTIC ORAL) Take by mouth daily.       levalbuterol (XOPENEX) 1.25 mg/3 mL nebulizer solution Take 3 mL (1.25 mg total) by nebulization every 4 (four) hours as needed for wheezing. 75 mL 6     lisinopril-hydrochlorothiazide (PRINZIDE,ZESTORETIC) 20-12.5 mg per tablet TAKE 1 TABLET BY MOUTH EVERY DAY 90 tablet 2     meloxicam (MOBIC) 15 MG tablet Take 1 tablet (15 mg total) by mouth daily. 90 tablet 1     omeprazole (PRILOSEC) 20 MG capsule TAKE ONE CAPSULE BY MOUTH ONE TIME DAILY 90 capsule 3     oxybutynin (DITROPAN XL) 5 MG ER tablet Take 1 tablet (5 mg total) by mouth daily. 30 tablet 11     pregabalin (LYRICA) 75 MG capsule Take 1 capsule (75 mg total) by mouth 2 (two) times a day. 60 capsule 3     traZODone (DESYREL) 150 MG tablet Take 1 tablet (150 mg total) by mouth at bedtime. 90 tablet 3     VENTOLIN HFA 90 mcg/actuation inhaler INHALE 1-2 PUFFS EVERY 4 (FOUR) HOURS AS NEEDED FOR WHEEZING OR SHORTNESS OF BREATH (COUGH). 18 Inhaler 2     CA CARB & GLUC/MAG OX & GLUC (CALCIUM MAGNESIUM ORAL) Take by mouth. 600-100-300 liquid       ECHINACEA ORAL Take 1 capsule by mouth daily.       fluticasone (FLONASE) 50 mcg/actuation nasal spray INSTILL 2 SPRAYS IN EACH NOSTRIL ONE TIME DAILY 48 g 3     FOLIC ACID/MULTIVITS-MIN/LUT (CENTRUM SILVER ORAL) Take 1 tablet by mouth 3 (three) times a day.       hypromellose (ARTIFICIAL TEARS,[id number],) Drop Administer 1 drop to both eyes 4 (four) times a day as needed (dry eyes). 30 mL 11     nebulizer and compressor (VIOS AEROSOL DELIVERY SYSTEM) Kristina 1 nebulization every 6 hours as needed for wheezing. 1 each 0     No current facility-administered medications for this visit.      Social History     Tobacco  Use   Smoking Status Former Smoker     Packs/day: 0.50     Years: 25.00     Pack years: 12.50   Smokeless Tobacco Never Used   Tobacco Comment    quit 25 yrs ago       OBJECTICE: /64 (Patient Site: Right Arm, Patient Position: Sitting)   Pulse 73   Temp 98.6  F (37  C) (Oral)   Resp 18   SpO2 97%   Breastfeeding? No      No results found for this or any previous visit (from the past 24 hour(s)).     GEN-alert, appropriate, in no apparent distress   HEENT-normal appearance to the sclera and conjunctiva bilaterally.   CV-regular rate and rhythm with no murmur   RESP-lungs clear to auscultation   ABDOMINAL-soft and nontender to palpation   EXTREM-warm with no ankle edema and no calf tenderness   SKIN-no ulcers or vesicles      Agustín Craft

## 2021-06-26 NOTE — PROGRESS NOTES
Progress Notes by Jermaine Correia PA-C at 7/5/2018 10:50 AM     Author: Jermaine Correia PA-C Service: -- Author Type: Physician Assistant    Filed: 7/5/2018 12:06 PM Encounter Date: 7/5/2018 Status: Signed    : Jermaine Correia PA-C (Physician Assistant)       Assessment:       Cellulitis, left lower extremity  Skin sore      Plan:       Antibiotics per orders  OTC analgesics discussed  Follow-up with podiatry in 3 days for symptoms recheck and further evaluation  Wound culture pending - will contact ONLY if wound culture shows resistance to Keflex  Discussed signs of worsening symptoms and when to be seen immediately for re-evaluation if needed.    Patient Instructions   You were seen today for a skin infection known as cellulitis.    Symptom management:  - Take antibiotics as prescribed for the full course, even if symptoms improve  - Keep the affected area clean with gentle water and soap  - If possible, keep the area elevated above the chest to help with swelling  - May use tylenol or ibuprofen for pain or discomfort as needed    Reasons to return immediately for re-evaluation:  - Difficulty or pain with moving joints around the affected area  - Develop discharge or pus-like drainage  - Fever of 100.4 or higher  - Worsening pain/swelling or spreading redness outside the marked area after 24 hours of antibiotics  - Vomiting    Otherwise, follow-up as directed or as needed with your primary care provider              Subjective:       Sara Ashton is a 78 y.o. female who presents with right foot pain. Onset of the symptoms was 2 weeks ago. Precipitating event: none known. Current symptoms include: sore on the plantar right foot with drainage and foot swelling. The foot swelling did not start until yesterday. Patient has had prior foot issues with fungal infections. She denies fever, nausea, and vomiting. Treatment has included topical antibiotics with a bandage.    The following portions of the  patient's history were reviewed and updated as appropriate: allergies, current medications and problem list.    Review of Systems  Pertinent items are noted in HPI.    Allergies  No Known Allergies      Objective:       /64  Pulse 65  Temp 98.7  F (37.1  C) (Oral)   Resp 16  Wt 190 lb (86.2 kg)  SpO2 96%  Breastfeeding? No  BMI 35.9 kg/m2  General appearance: alert, appears stated age, cooperative, no distress and non-toxic  Extremities: right foot: FROM of all joints, no cyanosis, trauma at the plantar foot; no calf tenderness or swelling  Pulses: 2+ and symmetric  Skin: 0.5cm open ulcer with no drainage, immediate surrounding calloused skin, mild erythema with moderate swelling affecting the right foot; increased warmth to touch   Neurologic: sensation to light touch intact

## 2021-06-28 NOTE — PROGRESS NOTES
Progress Notes by Osmar Nuñez DO at 3/18/2020  1:40 PM     Author: Osmar Nuñez DO Service: -- Author Type: Physician    Filed: 3/18/2020  2:33 PM Encounter Date: 3/18/2020 Status: Signed    : Osmar Nuñez DO (Physician)       Chief Complaint   Patient presents with   ? Back Pain     Bilateral mid back pain, urine is odorous, started yesterday, worsening     History of Present Illness: Rooming staff notes reviewed.  Patient is seen for chief concern of possible kidney infection.  She has discomfort across her bilateral lower flanks.  She had similar symptoms with a prior kidney infection.  Her urine has had a strong odor.  She does not have urinary urgency, dysuria, and no recent fever or chills.  The symptoms started yesterday.    Review of systems: See history of present illness, all others negative.     Current Outpatient Medications   Medication Sig Dispense Refill   ? acetaminophen (TYLENOL ARTHRITIS) 650 MG CR tablet Take 650 mg by mouth 4 (four) times a day as needed for pain.     ? aspirin 81 MG EC tablet Take 81 mg by mouth daily.            ? calcium, as carbonate, (OS-JACKIE) 500 mg calcium (1,250 mg) tablet Take 1 tablet by mouth daily.     ? cholecalciferol, vitamin D3, (VITAMIN D3) 2,000 unit Tab Take 1 tablet by mouth daily.     ? FLUoxetine (PROZAC) 20 MG capsule TAKE 3 CAPSULES BY MOUTH EVERY  capsule 3   ? fluticasone propionate (FLONASE) 50 mcg/actuation nasal spray INSTILL 2 SPRAYS IN EACH NOSTRIL ONE TIME DAILY 48 g 3   ? furosemide (LASIX) 20 MG tablet Take 2 tablets (40 mg total) by mouth daily. 180 tablet 2   ? GLUCOSAMINE SULFATE (GLUCOSAMINE ORAL) Take 1 capsule by mouth daily.            ? LACTOBACILLUS ACIDOPHILUS (PROBIOTIC ORAL) Take by mouth daily.     ? lisinopril-hydrochlorothiazide (PRINZIDE,ZESTORETIC) 20-12.5 mg per tablet Take 1 tablet by mouth daily. 90 tablet 1   ? meloxicam (MOBIC) 15 MG tablet TAKE 1 TABLET BY MOUTH EVERY DAY 90 tablet 1   ? omeprazole  (PRILOSEC) 20 MG capsule TAKE 1 CAPSULE BY MOUTH EVERY DAY 90 capsule 3   ? oxybutynin (DITROPAN XL) 5 MG ER tablet TAKE 1 TABLET BY MOUTH EVERY DAY 90 tablet 3   ? pregabalin (LYRICA) 75 MG capsule Take 1 capsule (75 mg total) by mouth 2 (two) times a day. 60 capsule 11   ? traZODone (DESYREL) 150 MG tablet TAKE 1 TABLET (150 MG TOTAL) BY MOUTH AT BEDTIME. 90 tablet 3   ? albuterol (PROVENTIL) 2.5 mg /3 mL (0.083 %) nebulizer solution Take 3 mL (2.5 mg total) by nebulization every 6 (six) hours as needed for wheezing. 75 vial 3   ? cefdinir (OMNICEF) 300 MG capsule Take 1 capsule (300 mg total) by mouth 2 (two) times a day for 7 days. 14 capsule 0   ? fexofenadine (ALLEGRA) 180 MG tablet Take 180 mg by mouth daily.     ? hypromellose (ARTIFICIAL TEARS,[id number],) Drop Administer 1 drop to both eyes 4 (four) times a day as needed (dry eyes). 30 mL 11   ? levalbuterol (XOPENEX) 1.25 mg/3 mL nebulizer solution Take 3 mL every 4 hours as needed for Reactive Airway disease. 75 mL 6   ? nebulizer and compressor (VIOS AEROSOL DELIVERY SYSTEM) Kristina 1 nebulization every 6 hours as needed for wheezing. 1 each 0   ? VENTOLIN HFA 90 mcg/actuation inhaler INHALE 1-2 PUFFS EVERY FOUR HOURS AS NEEDED FOR WHEEZING, SHORTNESS OF BREATH, OR COUGH. 18 Inhaler 2     No current facility-administered medications for this visit.      Past Medical History:   Diagnosis Date   ? Anxiety    ? Bronchitis with bronchospasm 6/11/2018   ? Depression    ? Diverticulitis    ? GERD (gastroesophageal reflux disease)    ? Hyperlipidemia    ? Hypertension    ? Osteoarthritis       Past Surgical History:   Procedure Laterality Date   ? [place] REMOVAL OF HEEL BONE      Description: Ostectomy Calcaneus;  Proc Date: 12/01/2004;   ? [place] TOTAL KNEE ARTHROPLASTY      Description: Total Knee Arthroplasty;  Proc Date: 06/01/2004;   ? [place] TOTAL KNEE ARTHROPLASTY      Description: Total Knee Arthroplasty;  Proc Date: 06/22/2009;      Social History     Tobacco Use    ? Smoking status: Former Smoker     Packs/day: 0.50     Years: 25.00     Pack years: 12.50   ? Smokeless tobacco: Never Used   ? Tobacco comment: quit 25 yrs ago   Substance Use Topics   ? Alcohol use: Yes     Comment: occasional   ? Drug use: No        Family History   Problem Relation Age of Onset   ? Heart attack Mother        Vitals:    03/18/20 1345   BP: 154/67   Pulse: 80   Resp: 16   Temp: 98  F (36.7  C)   TempSrc: Oral   SpO2: 95%   Weight: 176 lb (79.8 kg)       EXAM:   General: Vital signs reviewed.  Patient is in no acute appearing distress.  Breathing appears nonlabored.  Patient is alert and oriented ×3.    Exam was entirely through conversation, with attempt made to reduce physical contact due to the COVID-19 pandemic concerns in the community.    Recent Results (from the past 48 hour(s))   Urinalysis-UC if Indicated   Result Value Ref Range    Color, UA Yellow Colorless, Yellow, Straw, Light Yellow    Clarity, UA Clear Clear    Glucose, UA Negative Negative    Bilirubin, UA Small (!) Negative    Ketones, UA Negative Negative    Specific Gravity, UA >=1.030 1.005 - 1.030    Blood, UA Trace (!) Negative    pH, UA 5.5 5.0 - 8.0    Protein,  mg/dL (!) Negative mg/dL    Urobilinogen, UA 0.2 E.U./dL 0.2 E.U./dL, 1.0 E.U./dL    Nitrite, UA Negative Negative    Leukocytes, UA Negative Negative    Bacteria, UA      RBC, UA      WBC, UA      Squam Epithel, UA     Results from exam reviewed with patient.  There was insufficient urine for microscopic study.  The urine was noted to be concentrated, with some blood and bilirubin noted.  No leukocyte esterase or nitrite noted.    Assessment/Plan   1. Back pain  Urinalysis-UC if Indicated   2. Pyelonephritis, acute  cefdinir (OMNICEF) 300 MG capsule    Culture, Urine       Patient Instructions   We will let you know if the culture results show that a change in treatment is needed. I am treating you mostly due to your symptoms, more then the urine test  "result today. Call me at clinic in 2 days if you are not doing better.       Patient Education     Kidney Infection (Adult Female)    An infection in one or both kidneys is called \"pyelonephritis.\" It usually happens when bacteria (or rarely, viruses, fungi, or other disease-causing organisms) get into the kidney. The bacteria (or other disease-causing organisms) can enter the kidneys from the bladder or blood traveling from other parts of the body. A kidney infection can become serious. It can cause severe illness, scarring of the kidneys, or kidney failure if not treated properly.  Common causes for this problem include:    Not keeping the genital area clean and dry, which promotes the growth of bacteria    Wiping back to front which drags bacteria from the rectum toward the urinary opening (urethra)    Wearing tight pants or underwear (this lets moisture build up in the genital area, which helps bacteria grow)    Holding urine in for long periods of time    Dehydration  Kidney infections can cause symptoms similar to a bladder infection. Symptoms include:    Pain (or burning) when urinating    Having to urinate more often than usual    Blood in the urine (pink or red)    Abdominal pain or discomfort, usually in the lower abdomen    Pain in the side or back    Pain above the pubic bone    Fever or chills    Vomiting    Loss of appetite  Treatment is oral antibiotics, or in more severe cases, intramuscular or IV antibiotics. These are started right away and may be changed once urine culture results determine the infecting organisms. Treatment helps prevent a more serious kidney infection.  Medicines  Medicines can help in the treatment of a bladder infection:    Take antibiotics until they are used up, even if you feel better. It is important to finish them to make sure the infection is gone.    Unless another medicine was prescribed, you can use over-the-counter medicines for pain, fever, or discomfort. If you " have chronic liver of kidney disease, talk with your healthcare provider before using these medicines. Also talk with your provider if you've ever had a stomach ulcer or gastrointestinal (GI) bleeding, or are taking blood thinners.  Home care  The following are general care guidelines:    Stay home from work or school. Rest in bed until your fever breaks and you are feeling better, or as advised by your healthcare provider.    Drink lots of fluid unless you must restrict fluids for other medical reasons. This will force the medicine into your urinary system and flush the bacteria out of your body. Ask your healthcare provider how much you should drink.    Don't have sex until you have finished all of your medicine and your symptoms are gone.    Don't have caffeine, alcohol, or spicy foods. These foods may irritate the kidneys and bladder.    Don't take bubble baths. Sensitivity to the chemicals in bubble baths can irritate the urethra.    Make sure you wipe from front to back after using the toilet.    Wear loose cloths and cotton underwear.  Prevention  These self-care steps can help prevent future infections:    Drink plenty of fluids to prevent dehydration and flush out the bladder. Do this unless you must restrict fluids for other health reasons, or your healthcare provider told you not to.    Proper cleaning after going to the bathroom in important. Make sure you wipe from front to back after using the toilet.    Urinate more often. Don't try to hold urine in for a long time.    Don't wear tight-fitting pants and underwear.    Improve your diet to prevent constipation. Eat more fruits, vegetables, and fiber. Eat less junk and fatty foods. Constipation can make a urinary tract infection more likely. Talk with your healthcare provider if you have trouble with bowel movements.    Urinate right after intercourse to flush out the bladder.  Follow-up care  Follow up with your healthcare provider, or as advised.  Additional testing may be needed to make sure the infection has cleared. Close follow-up and further testing is very important to find the cause and to prevent future infections.  If a urine culture was done, you will be contacted if your treatment needs to be changed. If directed, you may call to find out the results.  If you had an X-ray, CT scan, or other diagnostic test, you will be notified of any new findings that may affect your care.  Call 911  Call 911 if any of the following occur:    Trouble breathing    Fainting or loss of consciousness    Rapid or very slow heart rate    Weakness, dizziness, or fainting    Difficulty arousing or confusion  When to seek medical advice  Call your healthcare provider right away if any of these occur:    Fever 100.4 F (38 C) or higher, or as directed by your healthcare provider    Not feeling better within 1 to 2 days after starting antibiotics    Any symptom that continues after 3 days of treatment    Increasing pain in the stomach, back, side, or groin area    Repeated vomiting    Not able to take prescribed medicine due to nausea or another reason    Bloody, dark-colored, or foul smelling urine    Trouble urinating or decreased urine output    No urine for 8 hours, no tears when crying, sunken eyes, or dry mouth  Date Last Reviewed: 10/1/2016    5533-1074 [employer]. [address] [place] [address], [place] [address]. All rights reserved. This information is not intended as a substitute for professional medical care. Always follow your healthcare professional's instructions.              Osmar Nuñez,

## 2021-06-30 NOTE — PROGRESS NOTES
Progress Notes by Idalia Dang CNP at 1/17/2021  2:10 PM     Author: Idalia Dang CNP Service: -- Author Type: Nurse Practitioner    Filed: 1/17/2021  3:20 PM Encounter Date: 1/17/2021 Status: Signed    : Idalia Dang CNP (Nurse Practitioner)       Chief Complaint   Patient presents with   ? Rash     SKIN REDNESS ALL OVER THE BODY. XFEW WEEKS        ASSESSMENT & PLAN:   Diagnoses and all orders for this visit:    Rash    Cellulitis of foot    Cough        Vasculitic appearing rash associated with worsening foot infection and new cough and congestion, all of which has been worsening over the last 2-3 days.  No fevers.     Discussed with Dr. Latrell Richmond in the walk-in clinic and we were in agreement the patient required emergency department evaluation.  Spoke with emergency room physician at [facility].  Patient will go to [facility] emergency department for further evaluation as discussed.      45 minutes spent on the date of the encounter doing chart review, review of outside records, review of test results, patient visit, documentation and discussion with other provider(s)       SUBJECTIVE    HPI:  HPI  Sara Ashton presents to the walk-in clinic with   Chief Complaint   Patient presents with   ? Rash     SKIN REDNESS ALL OVER THE BODY. XFEW WEEKS      Purple, mostly flat rash developing over the lower extremities over the last several weeks, increasing in size.  Some mild itching or irritation.  Rash preceded drug treatment with antibiotics.    Dorsal aspect of left foot has become more red recently and potentially more swollen.  Patient cannot feel this area baseline.  Believes rash worsened around the same time the foot infection appears to have worsened.    New cough and congestion starting 2 days ago.     Associated with: Toe infection treated with both cephalexin and doxycycline on January 1.  Has chronic kidney disease.  NSAIDs discontinued related to this.  Has neuropathy  in her feet at baseline.  Was referred to the wound clinic.  No appointments with the wound clinic yet.     Is seeing a vascular surgeon related to her toe on January 21.    See ROS for additional symptoms and/or pertinent negatives.       History obtained from the patient.      Review of Systems   Constitutional: Negative for chills and fever.   HENT: Positive for congestion and rhinorrhea.    Respiratory: Positive for cough (Started 2 days ago around the same time as worsening rash).    Skin: Positive for color change, rash and wound.       OBJECTIVE    Vitals:    01/17/21 1412   BP: 149/67   Patient Site: Right Arm   Patient Position: Sitting   Cuff Size: Adult Regular   Pulse: 96   Resp: 24   SpO2: 98%       Physical Exam  Constitutional:       General: She is not in acute distress.     Appearance: She is well-developed.   HENT:      Right Ear: External ear normal.      Left Ear: External ear normal.   Eyes:      General:         Right eye: No discharge.         Left eye: No discharge.      Conjunctiva/sclera: Conjunctivae normal.   Pulmonary:      Effort: Pulmonary effort is normal.      Comments: Wet cough noted during exam.  Musculoskeletal: Normal range of motion.      Right lower leg: Edema present.      Left lower leg: Edema (Pitting edema noted on foot bilaterally) present.   Skin:     General: Skin is warm and dry.      Capillary Refill: Capillary refill takes less than 2 seconds.      Findings: Erythema (Light pink erythema extending from toes to approximately 50% of dorsal aspect of left foot.) present.      Comments: Patches of purple, mostly flat rash located bilateral lower extremities tracking up with patient reported new area of similar on right forearm and small area of similar on left hand.   Neurological:      Mental Status: She is alert and oriented to person, place, and time.   Psychiatric:         Mood and Affect: Mood normal.         Behavior: Behavior normal.         Thought Content:  Thought content normal.         Judgment: Judgment normal.                   Labs/EKG:          Radiology:    No results found.      PATIENT INSTRUCTIONS:   Patient Instructions   The ER doctor wants to see you in the emergency department.  Please go to [facility] emergency department immediately.      They will determine the cause of your rash and treat you for your foot infection.     Please park in one of the patient parking spots near the emergency room entrance.

## 2021-07-21 ENCOUNTER — RECORDS - HEALTHEAST (OUTPATIENT)
Dept: ADMINISTRATIVE | Facility: CLINIC | Age: 81
End: 2021-07-21

## 2023-06-28 NOTE — PROGRESS NOTES
Admitted/transferred from: OSH  2 RN full   skin assessment completed by Letha Carlson, NEO and Gustavo LARSON.  Skin assessment finding: issues found L foot 2nd toe healing wound, red rash to BLE and trunk.    Interventions/actions: skin interventions standard precautions, provider to enter skin care orders     Will continue to monitor.   PAST SURGICAL HISTORY:  No significant past surgical history

## (undated) RX ORDER — LIDOCAINE HYDROCHLORIDE 10 MG/ML
INJECTION, SOLUTION EPIDURAL; INFILTRATION; INTRACAUDAL; PERINEURAL
Status: DISPENSED
Start: 2021-01-01